# Patient Record
Sex: FEMALE | Race: WHITE | NOT HISPANIC OR LATINO | Employment: OTHER | URBAN - METROPOLITAN AREA
[De-identification: names, ages, dates, MRNs, and addresses within clinical notes are randomized per-mention and may not be internally consistent; named-entity substitution may affect disease eponyms.]

---

## 2017-03-27 ENCOUNTER — HOSPITAL ENCOUNTER (EMERGENCY)
Facility: HOSPITAL | Age: 42
Discharge: HOME | End: 2017-03-27
Attending: EMERGENCY MEDICINE | Admitting: EMERGENCY MEDICINE
Payer: COMMERCIAL

## 2017-03-27 VITALS
DIASTOLIC BLOOD PRESSURE: 73 MMHG | OXYGEN SATURATION: 99 % | RESPIRATION RATE: 18 BRPM | HEIGHT: 64 IN | HEART RATE: 78 BPM | SYSTOLIC BLOOD PRESSURE: 130 MMHG | TEMPERATURE: 98 F | WEIGHT: 140 LBS | BODY MASS INDEX: 23.9 KG/M2

## 2017-03-27 DIAGNOSIS — R20.2 PARESTHESIAS: Primary | ICD-10-CM

## 2017-03-27 LAB
BUN SERPL-MCNC: 11 MG/DL (ref 6–30)
CHLORIDE SERPL-SCNC: 102 MMOL/L (ref 95–110)
CREAT SERPL-MCNC: 0.5 MG/DL (ref 0.5–1.4)
GLUCOSE SERPL-MCNC: 115 MG/DL (ref 70–110)
HCT VFR BLD CALC: 39 %PCV (ref 36–54)
POC IONIZED CALCIUM: 1.07 MMOL/L (ref 1.06–1.42)
POC TCO2 (MEASURED): 25 MMOL/L (ref 23–29)
POTASSIUM BLD-SCNC: 3.1 MMOL/L (ref 3.5–5.1)
SAMPLE: ABNORMAL
SODIUM BLD-SCNC: 141 MMOL/L (ref 136–145)

## 2017-03-27 PROCEDURE — 99283 EMERGENCY DEPT VISIT LOW MDM: CPT

## 2017-03-27 PROCEDURE — 25000003 PHARM REV CODE 250: Performed by: STUDENT IN AN ORGANIZED HEALTH CARE EDUCATION/TRAINING PROGRAM

## 2017-03-27 PROCEDURE — 99284 EMERGENCY DEPT VISIT MOD MDM: CPT | Mod: ,,, | Performed by: EMERGENCY MEDICINE

## 2017-03-27 RX ADMIN — POTASSIUM BICARBONATE 50 MEQ: 25 TABLET, EFFERVESCENT ORAL at 03:03

## 2017-03-27 NOTE — ED PROVIDER NOTES
Encounter Date: 3/27/2017    SCRIBE #1 NOTE: I, Charis Maddox, am scribing for, and in the presence of,  Dr. Chin. I have scribed the following portions of the note - the Resident attestation.       History     Chief Complaint   Patient presents with    Tingling     all over body, foggy, rika on for 1 week     Review of patient's allergies indicates:  No Known Allergies  HPI Comments: 42 year old female with no significant PMHx presents to the ED with tingling sensation in hands and feet. Patient claims she first noted tingling sensation in feet while wearing ski boots a few months ago, since then the tingling has progressed and now involves her hands and has become associated with migratory burning pains in her chest, back, and thighs over the last few days. Patient has also noticed more fatigue over the last few days. She is being followed by a PCP outside the [facility] system and labs were drawn this past Tuesday to work-up her symptoms. She has been prescribed oral B12 and Cymbalta, although she has not been taking the Cymbalta.    The history is provided by the patient.     History reviewed. No pertinent past medical history.  Past Surgical History:   Procedure Laterality Date     SECTION      DILATION AND CURETTAGE OF UTERUS       Family History   Problem Relation Age of Onset    No Known Problems Mother     No Known Problems Father     No Known Problems Sister     No Known Problems Brother     No Known Problems Maternal Aunt     No Known Problems Maternal Uncle     No Known Problems Paternal Aunt     No Known Problems Paternal Uncle     No Known Problems Maternal Grandmother     No Known Problems Maternal Grandfather     No Known Problems Paternal Grandmother     No Known Problems Paternal Grandfather     Amblyopia Neg Hx     Blindness Neg Hx     Cancer Neg Hx     Cataracts Neg Hx     Diabetes Neg Hx     Glaucoma Neg Hx     Hypertension Neg Hx     Macular degeneration Neg Hx      Retinal detachment Neg Hx     Strabismus Neg Hx     Stroke Neg Hx     Thyroid disease Neg Hx      Social History   Substance Use Topics    Smoking status: Never Smoker    Smokeless tobacco: Never Used    Alcohol use Yes      Comment: occasionally     Review of Systems   Constitutional: Positive for fatigue. Negative for chills, diaphoresis and fever.   HENT: Negative for congestion, rhinorrhea, sore throat and trouble swallowing.    Eyes: Negative for visual disturbance.   Respiratory: Negative for cough and shortness of breath.    Cardiovascular: Negative for chest pain and palpitations.   Gastrointestinal: Negative for abdominal distention, abdominal pain, diarrhea and nausea.   Endocrine: Negative for cold intolerance.   Genitourinary: Negative for dysuria.   Musculoskeletal: Positive for myalgias (Migratory burning sensation in chest, back, and thighs).   Skin: Negative for color change.   Neurological: Positive for weakness and numbness.   Psychiatric/Behavioral: Negative for agitation and confusion. The patient is nervous/anxious.        Physical Exam   Initial Vitals   BP Pulse Resp Temp SpO2   03/27/17 1247 03/27/17 1247 03/27/17 1247 03/27/17 1247 03/27/17 1247   133/86 114 18 98.9 °F (37.2 °C) 98 %     Physical Exam    Constitutional: She appears well-developed and well-nourished.   Nervous/Anxious   HENT:   Head: Normocephalic and atraumatic.   Mouth/Throat: Oropharynx is clear and moist.   Eyes: EOM are normal. No scleral icterus.   Neck: Normal range of motion.   Cardiovascular: Regular rhythm, normal heart sounds and intact distal pulses. Exam reveals no friction rub.    No murmur heard.  Tachycardic   Pulmonary/Chest: Breath sounds normal. No respiratory distress. She has no wheezes. She has no rales.   Abdominal: Soft. Bowel sounds are normal. She exhibits no distension. There is no tenderness. There is no rebound.   Neurological: She is alert and oriented to person, place, and time. She  has normal strength. No sensory deficit.   Negative romberg   Skin: Skin is warm and dry.   Psychiatric:   Anxious         ED Course   Procedures  Labs Reviewed   ISTAT PROCEDURE - Abnormal; Notable for the following:        Result Value    POC Glucose 115 (*)     POC Potassium 3.1 (*)     All other components within normal limits             Medical Decision Making:   History:   Old Medical Records: I decided to obtain old medical records.  Clinical Tests:   Lab Tests: Ordered and Reviewed            Scribe Attestation:   Scribe #1: I performed the above scribed service and the documentation accurately describes the services I performed. I attest to the accuracy of the note.    Attending Attestation:   Physician Attestation Statement for Resident:  As the supervising MD   Physician Attestation Statement: I have personally seen and examined this patient.   I agree with the above history. -:   As the supervising MD I agree with the above PE.    As the supervising MD I agree with the above treatment, course, plan, and disposition.   -: Tingling in both arms and both legs off and on for one day and other isolated, multiple vague complaints. She is very anxious and symptoms are likely due to anxiety. Neuro exam is normal. Her potassium is 3.1. Will replace this. She has had extensive outpatient work up with follow up. Suitable for discharge and continued outpatient work up.          Physician Attestation for Scribe:  Physician Attestation Statement for Scribe #1: I, Dr. Chin, reviewed documentation, as scribed by Charis Maddox in my presence, and it is both accurate and complete.                 ED Course     Clinical Impression:   The encounter diagnosis was Paresthesias.          Harley Chin MD  03/29/17 2751

## 2017-03-27 NOTE — ED AVS SNAPSHOT
"          [facility]  [address]  [facility] [address]               Eunice MORENO Isidrobharti   3/27/2017  1:20 PM   ED    Description:  Female : 1975   Department:  [facility]           Your Care was Coordinated By:     Provider Role From To    Harley Chin MD Attending Provider 17 [id number] --    Jakub Kelley MD Resident 17 [phone] --      Reason for Visit     Tingling           ED Disposition     ED Disposition Condition Comment    Discharge             To Do List           [facility] On [facility]     [facility] Nurse Care Line -  Assistance  Registered nurses in the [facility] provide clinical advisement, health education, appointment booking, and other advisory services.  Call for this free service at [phone].             Medications           Message regarding Medications     Verify the changes and/or additions to your medication regime listed below are the same as discussed with your clinician today.  If any of these changes or additions are incorrect, please notify your healthcare provider.        These medications were administered today        Dose Freq    potassium bicarbonate disintegrating tablet 50 mEq 50 mEq Once    Sig: Take 2 tablets (50 mEq total) by mouth once.    Class: Normal    Route: Oral           Verify that the below list of medications is an accurate representation of the medications you are currently taking.  If none reported, the list may be blank. If incorrect, please contact your healthcare provider. Carry this list with you in case of emergency.           Current Medications     DULOXETINE HCL (CYMBALTA ORAL) Take by mouth.    prenatal vit #108-iron-FA 30 mg iron- 800 mcg Tab Take by mouth.           Clinical Reference Information           Your Vitals Were     BP Pulse Temp Resp Height Weight    135/81 119 98.9 °F (37.2 °C) (Oral) 15 5' 4" (1.626 m) 63.5 kg (140 lb)    SpO2 BMI             " 100% 24.03 kg/m2         Allergies as of 3/27/2017     No Known Allergies      Immunizations Administered on Date of Encounter - 3/27/2017     None      ED Micro, Lab, POCT     Start Ordered       Status Ordering Provider    03/27/17 1452 03/27/17 1452  ISTAT PROCEDURE  Once      Final result     03/27/17 1404 03/27/17 1403  ISTAT CHEM8  Once      Acknowledged       ED Imaging Orders     None      [facility] Sign-Up     Activating your [facility] account is as easy as 1-2-3!     1) Visit [email or web address], select Sign Up Now, enter this activation code and your date of birth, then select Next.  [email or web address]  Expires: 5/11/2017  3:16 PM      2) Create a username and password to use when you visit [facility] in the future and select a security question in case you lose your password and select Next.    3) Enter your e-mail address and click Sign Up!    Additional Information  If you have questions, please e-mail [email or web address] or call [phone] to talk to our [facility] staff. Remember, [facility] is NOT to be used for urgent needs. For medical emergencies, dial 911.          [facility] complies with applicable Federal civil rights laws and does not discriminate on the basis of race, color, national origin, age, disability, or sex.        Language Assistance Services     ATTENTION: Language assistance services are available, free of charge. Please call [phone].      ATENCIÓN: Si habla español, tiene a hernandez disposición servicios gratuitos de asistencia lingüística. Llame al [phone].     CHÚ Ý: N?u b?n nói Ti?ng Vi?t, có các d?ch v? h? tr? ngôn ng? mi?n phí dành cho b?n. G?i s? [phone].

## 2017-03-28 ENCOUNTER — TELEPHONE (OUTPATIENT)
Dept: RHEUMATOLOGY | Facility: CLINIC | Age: 42
End: 2017-03-28

## 2017-03-28 NOTE — TELEPHONE ENCOUNTER
----- Message from Quiana Kenny PA-C sent at 3/28/2017  9:30 AM CDT -----  Hi-  I work in Onc with Andie Oconnell who recommended you as a rheumatologist. A friend of mine, above, has had some recent and worsening peripheral neuropathy and joint pain.  She had primary workup at [facility] with PCP and apparently had a positive, but low GRUPO.  She went to ED here at [facility] yesterday for symptoms but no imaging done.  I was wondering what the easiest/quickest way would be to get her in to see you or someone in the department (preferably you as you came highly recommended!) Any info you have is greatly appreciated. Thanks!  Quiana

## 2017-03-28 NOTE — TELEPHONE ENCOUNTER
----- Message from Quiana Kenny PA-C sent at 3/28/2017  9:30 AM CDT -----  Hi-  I work in Onc with Andie Oconnell who recommended you as a rheumatologist. A friend of mine, above, has had some recent and worsening peripheral neuropathy and joint pain.  She had primary workup at [facility] with PCP and apparently had a positive, but low GRUPO.  She went to ED here at [facility] yesterday for symptoms but no imaging done.  I was wondering what the easiest/quickest way would be to get her in to see you or someone in the department (preferably you as you came highly recommended!) Any info you have is greatly appreciated. Thanks!  Quiana

## 2017-03-29 DIAGNOSIS — G62.9 NEUROPATHY: Primary | ICD-10-CM

## 2017-04-04 ENCOUNTER — TELEPHONE (OUTPATIENT)
Dept: NEUROLOGY | Facility: CLINIC | Age: 42
End: 2017-04-04

## 2017-04-04 ENCOUNTER — OFFICE VISIT (OUTPATIENT)
Dept: NEUROLOGY | Facility: CLINIC | Age: 42
End: 2017-04-04
Payer: COMMERCIAL

## 2017-04-04 VITALS
HEART RATE: 101 BPM | HEIGHT: 64 IN | DIASTOLIC BLOOD PRESSURE: 74 MMHG | WEIGHT: 138.44 LBS | SYSTOLIC BLOOD PRESSURE: 111 MMHG | BODY MASS INDEX: 23.64 KG/M2

## 2017-04-04 DIAGNOSIS — G60.8 ACUTE SENSORY NEUROPATHY: Primary | ICD-10-CM

## 2017-04-04 PROCEDURE — 99999 PR PBB SHADOW E&M-EST. PATIENT-LVL III: CPT | Mod: PBBFAC,,, | Performed by: PSYCHIATRY & NEUROLOGY

## 2017-04-04 PROCEDURE — 99204 OFFICE O/P NEW MOD 45 MIN: CPT | Mod: S$GLB,,, | Performed by: PSYCHIATRY & NEUROLOGY

## 2017-04-04 RX ORDER — ZOLPIDEM TARTRATE 5 MG/1
TABLET ORAL
Refills: 0 | COMMUNITY
Start: 2017-03-27 | End: 2023-09-20 | Stop reason: ALTCHOICE

## 2017-04-04 RX ORDER — NAPROXEN SODIUM 550 MG/1
TABLET ORAL
Refills: 0 | COMMUNITY
Start: 2017-03-06 | End: 2023-09-20 | Stop reason: ALTCHOICE

## 2017-04-04 NOTE — PROGRESS NOTES
Name: Eunice Hickman  MRN: [id number]   CSN: [id number]      Date: 04/04/2017    Referring physician:  Aaareferral Self  No address on file    Chief Complaint / Interval History: Consult      History of Present Illness (HPI):  43 yo RH     Went skiing over [place], developed excrutiating toe pain, seemed to be more on top, felt too tight, maybe worse on the L than R.  Tried larger boots, went another day and was just as bad as before.  Better when the boots were removed.      After returning home to [facility], was having more persistent pain in the 2-4 toes, went and got new shoes.  And in retrospect, she had noted some     But, then developed tingling in the hands and fingers, more distally, maybe the ones with 3-4 digits.  Had plain films of the neck and shoulders and R elbow.    Had an experience of burning sensation into her body, seemed to come from the center of her chest.    This is in the realm of additional muscle soreness and some tendon issues - while martir more active with [employer] classes and tennis.    Saw a neurologist near  (Dr. Ines Narvaez) and had EMG/NCV.      Father had hip replaced.  Another spell of dizziness.  Had MRI several years ago, diagnosed with MS with 2 lesions, no LP.      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    Nonmotor/Premotor ROS:  Hyposmia (HENT)?No  RBD/sleep issues (Constitutional)?No  Depression/anxiety (Psychiatric)?No  Fatigue (Constitutional)?No  Constipation (GI)?No  Urinary issues ()?No  Sexual dysfunction ()?No  Orthostasis (Cardiovascular)?No  Leg swelling (Cardiovascular)? No  Falls (Musculoskeletal)?No  Cognitive impairment (Neurologic)?No  Psychoses (Psychiatric)?No  Pain/Paresthesia (Neurologic)?No  Visual changes (Eyes)?No  Moles / skin changes (Skin)?No  Stridor / SOB (Pulm)?No  Bruising (Heme)?No    Past Medical History: The patient  has no past medical history on file.    Social History: The patient  reports  "that she has never smoked. She has never used smokeless tobacco. She reports that she drinks alcohol. She reports that she does not use illicit drugs.    Family History: Their family history includes No Known Problems in her brother, father, maternal aunt, maternal grandfather, maternal grandmother, maternal uncle, mother, paternal aunt, paternal grandfather, paternal grandmother, paternal uncle, and sister. There is no history of Amblyopia, Blindness, Cancer, Cataracts, Diabetes, Glaucoma, Hypertension, Macular degeneration, Retinal detachment, Strabismus, Stroke, or Thyroid disease.    Allergies: Review of patient's allergies indicates no known allergies.     Meds:   Current Outpatient Prescriptions on File Prior to Visit   Medication Sig Dispense Refill    prenatal vit #108-iron-FA 30 mg iron- 800 mcg Tab Take by mouth.      DULOXETINE HCL (CYMBALTA ORAL) Take by mouth.       No current facility-administered medications on file prior to visit.        Exam:  /74 (BP Location: Right arm, Patient Position: Sitting, BP Method: Automatic)  Pulse 101  Ht 5' 4" (1.626 m)  Wt 62.8 kg (138 lb 7.2 oz)  BMI 23.76 kg/m2    Constitutional  Well-developed, well-nourished, appears stated age   Ophthalmoscopic  No papilledema with no hemorrhages or exudates bilaterally   Cardiovascular  Radial pulses 2+ and symmetric, no LE edema bilaterally   Neurological    * Mental status  MOCA deferred     - Orientation  Oriented to person, place, time, and situation     - Memory   Intact recent and remote     - Attention/concentration  Attentive, vigilant during exam     - Language  Naming & repetition intact, +2-step commands     - Fund of knowledge  Aware of current events     - Executive  Well-organized thoughts     - Other     * Cranial nerves       - CN II  PERRL, visual fields full to confrontation     - CN III, IV, VI  Extraocular movements full, normal pursuits and saccades     - CN V  Sensation V1 - V3 intact     - CN " VII  Face strong and symmetric bilaterally     - CN VIII  Hearing intact bilaterally     - CN IX, X  Palate raises midline and symmetric     - CN XI  SCM and trapezius 5/5 bilaterally     - CN XII  Tongue midline   * Motor  Muscle bulk normal, strength 5/5 throughout x weak 4+/5 EHL on L, no atrophy   * Sensory   Dim to temperature and vibration from mid forearm down and knees down B   * Coordination  No dysmetria with finger-to-nose or heel-to-shin   * Gait  Narrow based, stable   * Deep tendon reflexes  2+ and symmetric throughout UE   2+ knees B, 1+ ankles B   Babinski downgoing bilaterally     Laboratory/Radiological:  - Results:  Admission on 03/27/2017, Discharged on 03/27/2017   Component Date Value Ref Range Status    POC Glucose 03/27/2017 115* 70 - 110 mg/dL Final    POC BUN 03/27/2017 11  6 - 30 mg/dL Final    POC Creatinine 03/27/2017 0.5  0.5 - 1.4 mg/dL Final    POC Sodium 03/27/2017 141  136 - 145 mmol/L Final    POC Potassium 03/27/2017 3.1* 3.5 - 5.1 mmol/L Final    POC Chloride 03/27/2017 102  95 - 110 mmol/L Final    POC TCO2 (MEASURED) 03/27/2017 25  23 - 29 mmol/L Final    POC Ionized Calcium 03/27/2017 1.07  1.06 - 1.42 mmol/L Final    POC Hematocrit 03/27/2017 39  36 - 54 %PCV Final    Sample 03/27/2017 MITCH   Final       - Independent review of images:    Diagnoses:          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  Has dim reflefxes and length dependent sensory loss.  Needs differentiation of demyelinating versus axonal.  Await EMG results.    Medical Decision Making:  - labs  - await EMG results, may repeat  - continue B12 for now    Harley Koch MD, MPH  Division of Movement and Memory Disorders  [facility]  [phone]    ADDENDUM:  Results for CASIMIRO ZAMORANO (MRN [id number]) as of 4/11/2017 19:40   Ref. Range 4/4/2017 12:26   Vitamin B-12 Latest Ref Range: 210 - 950 pg/mL 1025 (H)   Methlymalonic Acid Latest Ref Range: <0.40 umol/L <0.10    Protein, Serum Latest Ref Range: 6.0 - 8.4 g/dL 6.8   Angio Convert Enzyme Latest Ref Range: 8 - 53 U/L 16   Homocysteine Latest Ref Range: 4.0 - 15.5 umol/L 4.2   Thiamine Latest Ref Range: 38 - 122 ug/L 41   Vitamin B6 Latest Ref Range: 5 - 50 ug/L 9   Vit D, 25-Hydroxy Latest Ref Range: 30 - 96 ng/mL 35   Anti-SSA Antibody Latest Ref Range: 0.00 - 19.99 EU 1.03   Anti-SSA Interpretation Latest Ref Range: Negative  Negative   Anti-SSB Antibody Latest Ref Range: 0.00 - 19.99 EU 0.10   Anti-SSB Interpretation Latest Ref Range: Negative  Negative   ds DNA Ab Latest Ref Range: Negative 1:10  Negative 1:10   Antigliadin Ab IgG Latest Ref Range: <20 UNITS 3   Antigliadin Abs, IgA Latest Ref Range: <20 UNITS 6   TTG IgA Latest Ref Range: <20 UNITS 5   TTG IgG Latest Ref Range: <20 UNITS 3   Immunoglobulin A (IgA) Latest Ref Range: 70 - 400 mg/dL 205   Cytoplasmic Neutrophilic Ab Latest Ref Range: <1:20 Titer <1:20   Perinuclear (P-ANCA) Latest Ref Range: <1:20 Titer <1:20   Albumin grams/dl Latest Ref Range: 3.35 - 5.55 g/dL 4.16   Alpha-1 grams/dl Latest Ref Range: 0.17 - 0.41 g/dL 0.26   Alpha-2 grams/dl Latest Ref Range: 0.43 - 0.99 g/dL 0.64   Beta grams/dl Latest Ref Range: 0.50 - 1.10 g/dL 0.64   Gamma grams/dl Latest Ref Range: 0.67 - 1.58 g/dL 1.10   RPR Latest Ref Range: Non-reactive  Non-reactive   GRUPO Screen Latest Ref Range: Negative <1:160  Negative <1:160

## 2017-04-04 NOTE — MR AVS SNAPSHOT
[facility]  [address]  [facility] [address]  Phone: [phone]  Fax: [phone]                  Eunice MORENO Isidrobharti   2017 11:00 AM   Office Visit    Description:  Female : 1975   Provider:  Harley Koch MD   Department:  [facility] - Neurology           Reason for Visit     Consult           Diagnoses this Visit        Comments    Acute sensory neuropathy    -  Primary            To Do List           Goals (5 Years of Data)     None      [facility] On Call     [facility] On Call Nurse Care Line -  Assistance  Unless otherwise directed by your provider, please contact [facility], our nurse care line that is available for  assistance.     Registered nurses in the [facility] Call Center provide: appointment scheduling, clinical advisement, health education, and other advisory services.  Call: [phone] (toll free)               Medications           Message regarding Medications     Verify the changes and/or additions to your medication regime listed below are the same as discussed with your clinician today.  If any of these changes or additions are incorrect, please notify your healthcare provider.             Verify that the below list of medications is an accurate representation of the medications you are currently taking.  If none reported, the list may be blank. If incorrect, please contact your healthcare provider. Carry this list with you in case of emergency.           Current Medications     CYANOCOBALAMIN, VITAMIN B-12, (VITAMIN B-12 ORAL) Take by mouth.    DULOXETINE HCL (CYMBALTA ORAL) Take by mouth.    naproxen sodium (ANAPROX) 550 MG tablet TK 1 T PO BID WF    prenatal vit #108-iron-FA 30 mg iron- 800 mcg Tab Take by mouth.    zolpidem (AMBIEN) 5 MG Tab TK 1 T PO QHS PRF INSOMNIA           Clinical Reference Information           Your Vitals Were     BP Pulse Height Weight BMI    111/74 (BP Location: Right arm, Patient Position: Sitting, BP  "Method: Automatic) 101 5' 4" (1.626 m) 62.8 kg (138 lb 7.2 oz) 23.76 kg/m2      Blood Pressure          Most Recent Value    BP  111/74      Allergies as of 4/4/2017     No Known Allergies      Immunizations Administered on Date of Encounter - 4/4/2017     None      Orders Placed During Today's Visit     Future Labs/Procedures Expected by Expires    GRUPO  4/4/2017 6/3/2018    ANGIOTENSIN CONVERTING ENZYME  4/4/2017 6/3/2018    ANTI -SSA ANTIBODY  4/4/2017 6/3/2018    ANTI-DNA ANTIBODY, DOUBLE-STRANDED  4/4/2017 6/3/2018    ANTI-NEUTROPHILIC CYTOPLASMIC ANTIBODY  4/4/2017 6/3/2018    ANTI-SSB ANTIBODY  4/4/2017 6/3/2018    Celiac Disease Panel  4/4/2017 6/3/2018    HOMOCYSTEINE, SERUM  4/4/2017 6/3/2018    METHYLMALONIC ACID, SERUM  4/4/2017 6/3/2018    PROTEIN ELECTROPHORESIS, SERUM  4/4/2017 6/3/2018    RPR  4/4/2017 6/3/2018    VITAMIN B12  4/4/2017 6/3/2018    VITAMIN B1  4/4/2017 6/3/2018    VITAMIN B6  4/4/2017 6/3/2018    VITAMIN D  4/4/2017 6/3/2018      [facility] Sign-Up     Activating your [facility] account is as easy as 1-2-3!     1) Visit [email or web address], select Sign Up Now, enter this activation code and your date of birth, then select Next.  [email or web address]  Expires: 5/11/2017  3:16 PM      2) Create a username and password to use when you visit [facility] in the future and select a security question in case you lose your password and select Next.    3) Enter your e-mail address and click Sign Up!    Additional Information  If you have questions, please e-mail [email or web address] or call [phone] to talk to our [facility] staff. Remember, [facility] is NOT to be used for urgent needs. For medical emergencies, dial 911.         Language Assistance Services     ATTENTION: Language assistance services are available, free of charge. Please call [phone].      ATENCIÓN: Si habla español, tiene a hernandez disposición servicios gratuitos de asistencia lingüística. Llame al [phone].     CHÚ Ý: N?u b?n " nói Ti?ng Vi?t, có các d?ch v? h? tr? ngôn ng? mi?n phí dành cho b?n. G?i s? [phone].         [facility] complies with applicable Federal civil rights laws and does not discriminate on the basis of race, color, national origin, age, disability, or sex.

## 2017-04-07 ENCOUNTER — PATIENT MESSAGE (OUTPATIENT)
Dept: NEUROLOGY | Facility: CLINIC | Age: 42
End: 2017-04-07

## 2017-04-07 ENCOUNTER — TELEPHONE (OUTPATIENT)
Dept: NEUROLOGY | Facility: CLINIC | Age: 42
End: 2017-04-07

## 2017-04-07 NOTE — TELEPHONE ENCOUNTER
Pt anxiety getting worse due to symptoms: numbness in finger tip and calf along with tingling in chin. She stated that her pcp prescribed her xanax to help her. Pt having bad anxiety attacks.

## 2017-04-07 NOTE — TELEPHONE ENCOUNTER
----- Message from Ruth Todd sent at 4/6/2017  9:39 AM CDT -----  Contact: Shanna [phone]  Patient is requesting a call from Dr. Koch in regards to her Neuropathy and Anxiety syptoms.

## 2017-04-12 ENCOUNTER — TELEPHONE (OUTPATIENT)
Dept: NEUROLOGY | Facility: CLINIC | Age: 42
End: 2017-04-12

## 2017-04-12 NOTE — TELEPHONE ENCOUNTER
Returned call to pt and she would like to know the results of he labs. They ones which were pending. Also, would like the results of the EMG. She states her symptoms are worst as well . She would like to know the next steps in treatment.

## 2017-04-12 NOTE — TELEPHONE ENCOUNTER
----- Message from Yolanda Hopper sent at 4/12/2017  8:08 AM CDT -----  Contact: pt [phone]   Pt is calling to speak with  regarding her labs.pls call

## 2017-04-13 ENCOUNTER — PATIENT MESSAGE (OUTPATIENT)
Dept: NEUROLOGY | Facility: CLINIC | Age: 42
End: 2017-04-13

## 2017-04-17 ENCOUNTER — TELEPHONE (OUTPATIENT)
Dept: NEUROLOGY | Facility: CLINIC | Age: 42
End: 2017-04-17

## 2017-04-19 ENCOUNTER — TELEPHONE (OUTPATIENT)
Dept: NEUROLOGY | Facility: CLINIC | Age: 42
End: 2017-04-19

## 2017-04-19 NOTE — TELEPHONE ENCOUNTER
----- Message from Ruth Todd sent at 4/18/2017 10:00 AM CDT -----  Contact: Patient [phone]  Patient is calling in regards to the phone call she was supposed to receive from Dr. Koch, in regards to getting admitted into the hospital for a few days, patient is not doing well. Please call

## 2017-04-20 ENCOUNTER — HOSPITAL ENCOUNTER (OUTPATIENT)
Facility: HOSPITAL | Age: 42
Discharge: HOME | End: 2017-04-22
Attending: EMERGENCY MEDICINE | Admitting: HOSPITALIST
Payer: COMMERCIAL

## 2017-04-20 DIAGNOSIS — O34.219 PREVIOUS CESAREAN SECTION COMPLICATING PREGNANCY, ANTEPARTUM CONDITION OR COMPLICATION: ICD-10-CM

## 2017-04-20 DIAGNOSIS — R20.0 NUMBNESS: Primary | ICD-10-CM

## 2017-04-20 DIAGNOSIS — O35.2XX0: ICD-10-CM

## 2017-04-20 DIAGNOSIS — G35 MULTIPLE SCLEROSIS: ICD-10-CM

## 2017-04-20 LAB
ALBUMIN SERPL BCP-MCNC: 4.1 G/DL
ALP SERPL-CCNC: 48 U/L
ALT SERPL W/O P-5'-P-CCNC: 14 U/L
ANION GAP SERPL CALC-SCNC: 11 MMOL/L
AST SERPL-CCNC: 16 U/L
BASOPHILS # BLD AUTO: 0.03 K/UL
BASOPHILS NFR BLD: 0.5 %
BILIRUB SERPL-MCNC: 0.6 MG/DL
BUN SERPL-MCNC: 16 MG/DL
CALCIUM SERPL-MCNC: 9.2 MG/DL
CHLORIDE SERPL-SCNC: 104 MMOL/L
CO2 SERPL-SCNC: 24 MMOL/L
CREAT SERPL-MCNC: 0.8 MG/DL
DIFFERENTIAL METHOD: ABNORMAL
EOSINOPHIL # BLD AUTO: 0.6 K/UL
EOSINOPHIL NFR BLD: 9 %
ERYTHROCYTE [DISTWIDTH] IN BLOOD BY AUTOMATED COUNT: 12.5 %
EST. GFR  (AFRICAN AMERICAN): >60 ML/MIN/1.73 M^2
EST. GFR  (NON AFRICAN AMERICAN): >60 ML/MIN/1.73 M^2
GLUCOSE SERPL-MCNC: 85 MG/DL
HCT VFR BLD AUTO: 38.2 %
HGB BLD-MCNC: 13.3 G/DL
INR PPP: 1
LYMPHOCYTES # BLD AUTO: 1.7 K/UL
LYMPHOCYTES NFR BLD: 25.8 %
MCH RBC QN AUTO: 30.4 PG
MCHC RBC AUTO-ENTMCNC: 34.8 %
MCV RBC AUTO: 87 FL
MONOCYTES # BLD AUTO: 0.3 K/UL
MONOCYTES NFR BLD: 4.5 %
NEUTROPHILS # BLD AUTO: 4 K/UL
NEUTROPHILS NFR BLD: 60 %
PLATELET # BLD AUTO: 199 K/UL
PMV BLD AUTO: 9.1 FL
POTASSIUM SERPL-SCNC: 4.3 MMOL/L
PROT SERPL-MCNC: 6.9 G/DL
PROTHROMBIN TIME: 10.9 SEC
RBC # BLD AUTO: 4.37 M/UL
SODIUM SERPL-SCNC: 139 MMOL/L
WBC # BLD AUTO: 6.66 K/UL

## 2017-04-20 PROCEDURE — 85610 PROTHROMBIN TIME: CPT

## 2017-04-20 PROCEDURE — 80053 COMPREHEN METABOLIC PANEL: CPT

## 2017-04-20 PROCEDURE — 25500020 PHARM REV CODE 255: Performed by: HOSPITALIST

## 2017-04-20 PROCEDURE — 25000003 PHARM REV CODE 250: Performed by: PHYSICIAN ASSISTANT

## 2017-04-20 PROCEDURE — 99215 OFFICE O/P EST HI 40 MIN: CPT | Mod: ,,, | Performed by: PSYCHIATRY & NEUROLOGY

## 2017-04-20 PROCEDURE — 85025 COMPLETE CBC W/AUTO DIFF WBC: CPT

## 2017-04-20 PROCEDURE — G0378 HOSPITAL OBSERVATION PER HR: HCPCS

## 2017-04-20 PROCEDURE — 99219 PR INITIAL OBSERVATION CARE,LEVL II: CPT | Mod: ,,, | Performed by: PHYSICIAN ASSISTANT

## 2017-04-20 PROCEDURE — 99284 EMERGENCY DEPT VISIT MOD MDM: CPT | Mod: ,,, | Performed by: HOSPITALIST

## 2017-04-20 PROCEDURE — 99284 EMERGENCY DEPT VISIT MOD MDM: CPT

## 2017-04-20 PROCEDURE — A9585 GADOBUTROL INJECTION: HCPCS | Performed by: HOSPITALIST

## 2017-04-20 RX ORDER — GADOBUTROL 604.72 MG/ML
7 INJECTION INTRAVENOUS
Status: COMPLETED | OUTPATIENT
Start: 2017-04-20 | End: 2017-04-20

## 2017-04-20 RX ORDER — NAPROXEN SODIUM 275 MG/1
550 TABLET ORAL 2 TIMES DAILY PRN
Status: DISCONTINUED | OUTPATIENT
Start: 2017-04-20 | End: 2017-04-22 | Stop reason: HOSPADM

## 2017-04-20 RX ORDER — ACETAMINOPHEN 325 MG/1
650 TABLET ORAL EVERY 4 HOURS PRN
Status: DISCONTINUED | OUTPATIENT
Start: 2017-04-20 | End: 2017-04-20

## 2017-04-20 RX ORDER — ALPRAZOLAM 1 MG/1
1 TABLET ORAL ONCE
Status: COMPLETED | OUTPATIENT
Start: 2017-04-20 | End: 2017-04-20

## 2017-04-20 RX ORDER — ALPRAZOLAM 0.25 MG/1
0.25 TABLET ORAL EVERY 6 HOURS PRN
Status: DISCONTINUED | OUTPATIENT
Start: 2017-04-20 | End: 2017-04-22 | Stop reason: HOSPADM

## 2017-04-20 RX ORDER — ONDANSETRON 8 MG/1
8 TABLET, ORALLY DISINTEGRATING ORAL EVERY 8 HOURS PRN
Status: DISCONTINUED | OUTPATIENT
Start: 2017-04-20 | End: 2017-04-22 | Stop reason: HOSPADM

## 2017-04-20 RX ORDER — SODIUM CHLORIDE 0.9 % (FLUSH) 0.9 %
3 SYRINGE (ML) INJECTION EVERY 8 HOURS
Status: DISCONTINUED | OUTPATIENT
Start: 2017-04-20 | End: 2017-04-22 | Stop reason: HOSPADM

## 2017-04-20 RX ORDER — ZOLPIDEM TARTRATE 5 MG/1
5 TABLET ORAL NIGHTLY PRN
Status: DISCONTINUED | OUTPATIENT
Start: 2017-04-20 | End: 2017-04-22 | Stop reason: HOSPADM

## 2017-04-20 RX ORDER — ACETAMINOPHEN 325 MG/1
650 TABLET ORAL EVERY 6 HOURS PRN
Status: DISCONTINUED | OUTPATIENT
Start: 2017-04-20 | End: 2017-04-22 | Stop reason: HOSPADM

## 2017-04-20 RX ORDER — CYANOCOBALAMIN (VITAMIN B-12) 250 MCG
250 TABLET ORAL DAILY
Status: DISCONTINUED | OUTPATIENT
Start: 2017-04-20 | End: 2017-04-22 | Stop reason: HOSPADM

## 2017-04-20 RX ORDER — ALPRAZOLAM 0.5 MG/1
0.5 TABLET ORAL DAILY PRN
Status: DISCONTINUED | OUTPATIENT
Start: 2017-04-20 | End: 2017-04-20

## 2017-04-20 RX ORDER — ALPRAZOLAM 1 MG/1
1 TABLET ORAL DAILY PRN
Status: DISCONTINUED | OUTPATIENT
Start: 2017-04-20 | End: 2017-04-20

## 2017-04-20 RX ORDER — ACETAMINOPHEN 325 MG/1
TABLET ORAL
Status: DISPENSED
Start: 2017-04-20 | End: 2017-04-21

## 2017-04-20 RX ORDER — ONDANSETRON 2 MG/ML
4 INJECTION INTRAMUSCULAR; INTRAVENOUS EVERY 8 HOURS PRN
Status: DISCONTINUED | OUTPATIENT
Start: 2017-04-20 | End: 2017-04-22 | Stop reason: HOSPADM

## 2017-04-20 RX ADMIN — SODIUM CHLORIDE, PRESERVATIVE FREE 3 ML: 5 INJECTION INTRAVENOUS at 03:04

## 2017-04-20 RX ADMIN — ACETAMINOPHEN 650 MG: 325 TABLET ORAL at 02:04

## 2017-04-20 RX ADMIN — ALPRAZOLAM 1 MG: 1 TABLET ORAL at 05:04

## 2017-04-20 RX ADMIN — GADOBUTROL 7 ML: 604.72 INJECTION INTRAVENOUS at 07:04

## 2017-04-20 RX ADMIN — ZOLPIDEM TARTRATE 5 MG: 5 TABLET ORAL at 09:04

## 2017-04-20 NOTE — CONSULTS
[facility]  Neurology  Consult Note    Patient Name: Eunice Hickman  MRN: [id number]  Admission Date: 2017  Hospital Length of Stay: 0 days  Code Status: Full Code   Attending Provider: Harley Koch MD  Consulting Provider: Jakub Kelley MD  Primary Care Physician: Abigail Roberts MD  Principal Problem:Numbness    Consults   Subjective:     Chief Complaint:  Distal parathesia    HPI:   42 year old female with no significant PMHx presents to the ED with worsening paraesthesias in hands and feet. Neurology consulted for management. Patient claims she has had progressive tingling, burning, myalagia, arthralgia, and weakness since [email or web address], developed excrutiating toe pain while wearing ski boots, symptoms continued after returning home, new boots and shoes did not provide any relief. She later developed tingling in the hands and fingers, more distally. Patient seen in recently in clinic by Dr. Koch on 17, began autoimmune/infectious work-up with no pertinent findings so far, also saw a neurologist near  (Dr. Ines Narvaez) and had EMG/NCV, results were unimpressive. Patient now presenting with worsening symptoms as the burning and tingling is now affecting her calf and forearm, although symptoms are intermittent and resolve spontaneously. She also noticed worsening burning pain in her toes after standing up for 20-30 minutes. She denies any bowel/bladder incontinence or saddle paresthesias.        History reviewed. No pertinent past medical history.    Past Surgical History:   Procedure Laterality Date     SECTION      DILATION AND CURETTAGE OF UTERUS      HERNIA REPAIR       Review of patient's allergies indicates:  No Known Allergies    No current facility-administered medications on file prior to encounter.      Current Outpatient Prescriptions on File Prior to Encounter   Medication Sig    CYANOCOBALAMIN, VITAMIN B-12, (VITAMIN B-12 ORAL)  Take by mouth.    naproxen sodium (ANAPROX) 550 MG tablet TK 1 T PO BID WF    zolpidem (AMBIEN) 5 MG Tab TK 1 T PO QHS PRF INSOMNIA    prenatal vit #108-iron-FA 30 mg iron- 800 mcg Tab Take by mouth.    [DISCONTINUED] DULOXETINE HCL (CYMBALTA ORAL) Take by mouth.     Family History     Problem Relation (Age of Onset)    No Known Problems Mother, Father, Sister, Brother, Maternal Aunt, Maternal Uncle, Paternal Aunt, Paternal Uncle, Maternal Grandmother, Maternal Grandfather, Paternal Grandmother, Paternal Grandfather        Social History Main Topics    Smoking status: Never Smoker    Smokeless tobacco: Never Used    Alcohol use Yes      Comment: occasionally    Drug use: No    Sexual activity: Yes     Partners: Male     Review of Systems   Constitutional: Positive for fatigue. Negative for activity change, chills and fever.   HENT: Negative for congestion, rhinorrhea and sore throat.    Eyes: Positive for pain (intermittent eye discomfort). Negative for photophobia.   Respiratory: Negative for cough, choking and chest tightness.    Cardiovascular: Positive for chest pain (intermittent chest burning sensation). Negative for leg swelling.   Gastrointestinal: Negative for abdominal distention, abdominal pain, constipation, diarrhea, nausea and vomiting.   Genitourinary: Negative for dysuria.   Musculoskeletal: Positive for arthralgias and myalgias. Negative for neck pain.   Skin: Negative for color change and rash.   Neurological: Positive for dizziness, weakness, light-headedness and headaches. Negative for numbness.   Psychiatric/Behavioral: Negative for agitation, behavioral problems and confusion.     Objective:     Vital Signs (Most Recent):  Temp: 98.2 °F (36.8 °C) (04/20/17 1422)  Pulse: 100 (04/20/17 1422)  Resp: 16 (04/20/17 1422)  BP: 104/66 (04/20/17 1422)  SpO2: 100 % (04/20/17 1422) Vital Signs (24h Range):  Temp:  [98.1 °F (36.7 °C)-98.2 °F (36.8 °C)] 98.2 °F (36.8 °C)  Pulse:  []  100  Resp:  [16] 16  SpO2:  [100 %] 100 %  BP: (104-157)/(55-76) 104/66     Weight: 61.7 kg (136 lb)  Body mass index is 23.34 kg/(m^2).    Physical Exam   Constitutional: She is oriented to person, place, and time. She appears well-developed and well-nourished.   Anxious   HENT:   Head: Normocephalic and atraumatic.   Mouth/Throat: Oropharyngeal exudate present.   Eyes: EOM are normal.   Cardiovascular: Normal rate, regular rhythm and normal heart sounds.    Pulmonary/Chest: Effort normal and breath sounds normal.   Abdominal: Soft. She exhibits no distension. There is no tenderness.   Neurological: She is alert and oriented to person, place, and time. She has a normal Finger-Nose-Finger Test, a normal Heel to Shin Test, a normal Romberg Test (Mild sway) and a normal Tandem Gait Test. Gait normal.   Reflex Scores:       Bicep reflexes are 2+ on the right side and 2+ on the left side.       Patellar reflexes are 3+ on the right side and 3+ on the left side.  Skin: Skin is warm and dry.   Psychiatric: Her speech is normal.       NEUROLOGICAL EXAMINATION:     MENTAL STATUS   Oriented to person, place, and time.   Attention: normal. Concentration: normal.   Speech: speech is normal     CRANIAL NERVES     CN II   Visual fields full to confrontation.     CN III, IV, VI   Extraocular motions are normal.   Right pupil: Consensual response: APD.   Left pupil: Consensual response: intact.   CN III: no CN III palsy  CN VI: no CN VI palsy  Nystagmus: none   Diplopia: none  Upgaze: normal  Downgaze: normal    CN V   Facial sensation intact.     CN VII   Facial expression full, symmetric.     CN VIII   CN VIII normal.   Hearing: intact    CN IX, X   CN IX normal.   CN X normal.     CN XI   CN XI normal.     CN XII   CN XII normal.     MOTOR EXAM   Muscle bulk: normal  Overall muscle tone: normal    Strength   Right strength: Left HLE mildly weaker then left    REFLEXES     Reflexes   Right biceps: 2+  Left biceps: 2+  Right  patellar: 3+  Left patellar: 3+  Right plantar: normal  Left plantar: normal  Right ankle clonus: absent  Left ankle clonus: absent    SENSORY EXAM   Light touch normal.   Right leg vibration: decreased from toes  Left leg vibration: decreased from toes  Lowest level of vibration sensation on right: Decreased vibration sense in bilateral distal LE.  Lowest level of vibration sensation on left: same as right.  Romberg: negative (Mild sway)    GAIT AND COORDINATION     Gait  Gait: normal     Coordination   Finger to nose coordination: normal  Heel to shin coordination: normal  Tandem walking coordination: normal    Tremor   Action tremor: High frequency low amplitude in right upper extremity.      Significant Labs: All pertinent lab results from the past 24 hours have been reviewed.    Significant Imaging: I have reviewed and interpreted all pertinent imaging results/findings within the past 24 hours.    Assessment and Plan:     * Numbness  42 year old female presents to ED with worsening peripheral paresthesias. Neurology consulted for management.    - intermittent tingling, burning, weakness in distal extremities, also with amaya-oral tingling, symptoms have seemed to progress over last couple months  - seen by Dr. 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 Has brisk reflexes and length dependent sensory loss. Needs differentiation of demyelinating versus axonal.  - had EMG at outside facility, report reviewed and unimpressive  - recommend MRI W WO Brain, C-spine, T-spine  - pending MRI results may need LP/CSF studies and repeat EMG  - will continue to follow      VTE Risk Mitigation         Ordered     Medium Risk of VTE  Once      04/20/17 1331          Thank you for your consult. I will follow-up with patient. Please contact us if you have any additional questions.    Jakub Kelley,  MD  Neurology  [facility]    ==========  Patient seen and examined.  I agree with the history, exam, assessment and plan within the resident's note as stated above.  Note has been edited by me to reflect my work and changes.    Well known to me, still has an exam and story that may best fit for neuropathy, but has intact (even brisk) reflexes and fluctuating patchy complaints that may be consistent with inflammatory/demyelinating events as well.  Workup as above.    Harley Koch MD, MPH  Division of Movement and Memory Disorders  [facility]

## 2017-04-20 NOTE — ED NOTES
APPEARANCE: awake and alert in NAD.  SKIN: warm, dry and intact. No breakdown or bruising.  MUSCULOSKELETAL: Patient moving all extremities spontaneously, no obvious swelling or deformities noted. Ambulates independently.  RESPIRATORY: no shortness of breath. All breath sounds CTA bilaterally.  CARDIAC: heart tones normal. Regular rate and rhythm; 2+ distal pulses; no peripheral edema  ABDOMEN: S/ND/NT, normoactive bowel sounds present in all four quadrants. Normal stool pattern.  : voids spontaneously without difficulty.  Neurologic: AAO x 4; follows commands equal strength in all extremities; Pt reports numbness and tingling bilateral upper and lower extremeties.

## 2017-04-20 NOTE — ED TRIAGE NOTES
PT reports Dr. Bah is expecting her in the ER. PT reports in both upper and lower extremities she has been having neuropathy, muscle jerking x 1 month. Pt reports episodes of extremity weakness.

## 2017-04-20 NOTE — H&P
[facility] Hospital Medicine  History & Physical    Patient Name: Eunice Hickman  MRN: [id number]  Admission Date: 2017  Attending Physician: Nenita Calhoun MD   Primary Care Provider: Abigail Roberts MD    [facility] Medicine Team: [facility] Neetu Mcgovern PA-C     Patient information was obtained from patient and ER records.     Subjective:     Principal Problem:Numbness    Chief Complaint:   Chief Complaint   Patient presents with    Numbness     Pt reports bilateral facial, hands, and feet numbness x 1 month Pt of Dr. Koch.         HPI: 42 year old female with no significant PMHx being admitted to observation for further evaluation of worsening numbness. Patient says the numbness began in her toes bilaterally during a ski trip at the end of February. Patient states since then, the numbness has spread through her feet and presents in her hands. Patient denies alleviating factors. She states that being on her feet for 30 minutes or more aggravates the numbness. Patient endorses associated anxiety, SOB, and weakness with movement. Patient's father had an incidental finding of 2 lesions on MRI that was concerning for MS, but he never had symptoms.    Patient was referred to the ED by neurology for further workup. In the ED, CBC, CMP were unremarkable; Alk Phos 48. Neurology was consulted, ordered and MRI of brain, c-spine, and t-spine; appreciate further recommendations.     Patient denies CP, N/V, paralysis, slurred speech, trouble breathing/swallowing.        History reviewed. No pertinent past medical history.    Past Surgical History:   Procedure Laterality Date     SECTION      DILATION AND CURETTAGE OF UTERUS      HERNIA REPAIR         Review of patient's allergies indicates:  No Known Allergies    No current facility-administered medications on file prior to encounter.      Current Outpatient Prescriptions on File Prior to Encounter   Medication Sig     CYANOCOBALAMIN, VITAMIN B-12, (VITAMIN B-12 ORAL) Take by mouth.    naproxen sodium (ANAPROX) 550 MG tablet TK 1 T PO BID WF    zolpidem (AMBIEN) 5 MG Tab TK 1 T PO QHS PRF INSOMNIA    prenatal vit #108-iron-FA 30 mg iron- 800 mcg Tab Take by mouth.    [DISCONTINUED] DULOXETINE HCL (CYMBALTA ORAL) Take by mouth.     Family History     Problem Relation (Age of Onset)    No Known Problems Mother, Father, Sister, Brother, Maternal Aunt, Maternal Uncle, Paternal Aunt, Paternal Uncle, Maternal Grandmother, Maternal Grandfather, Paternal Grandmother, Paternal Grandfather        Social History Main Topics    Smoking status: Never Smoker    Smokeless tobacco: Never Used    Alcohol use Yes      Comment: occasionally    Drug use: No    Sexual activity: Yes     Partners: Male     Review of Systems   Constitutional: Positive for fatigue. Negative for chills, fever and unexpected weight change.   HENT: Negative for drooling, ear pain, hearing loss, sinus pressure, tinnitus and trouble swallowing.    Eyes: Negative for photophobia, pain and visual disturbance.   Respiratory: Positive for shortness of breath. Negative for cough, choking, chest tightness and wheezing.    Cardiovascular: Negative for chest pain, palpitations and leg swelling.   Gastrointestinal: Negative for abdominal distention, abdominal pain, diarrhea, nausea and vomiting.   Endocrine: Negative for heat intolerance.   Genitourinary: Negative for dysuria, flank pain, hematuria and urgency.   Musculoskeletal: Negative for gait problem, myalgias, neck pain and neck stiffness.   Neurological: Positive for weakness and numbness. Negative for dizziness, tremors, seizures, syncope, facial asymmetry, speech difficulty and light-headedness.   Psychiatric/Behavioral: Negative for agitation, confusion and hallucinations. The patient is nervous/anxious.      Objective:     Vital Signs (Most Recent):  Temp: 98.1 °F (36.7 °C) (04/20/17 0847)  Pulse: 89 (04/20/17  1130)  Resp: 16 (04/20/17 0847)  BP: (!) 157/74 (04/20/17 1130)  SpO2: 100 % (04/20/17 1130) Vital Signs (24h Range):  Temp:  [98.1 °F (36.7 °C)] 98.1 °F (36.7 °C)  Pulse:  [] 89  Resp:  [16] 16  SpO2:  [100 %] 100 %  BP: (107-157)/(55-76) 157/74     Weight: 61.7 kg (136 lb)  Body mass index is 23.34 kg/(m^2).    Physical Exam   Constitutional: She is oriented to person, place, and time. She appears well-developed and well-nourished. No distress (very anxious).   HENT:   Head: Normocephalic and atraumatic.   Right Ear: External ear normal.   Left Ear: External ear normal.   Nose: Nose normal.   Mouth/Throat: Oropharynx is clear and moist.   Eyes: Conjunctivae and EOM are normal. Pupils are equal, round, and reactive to light. No scleral icterus.   Neck: Normal range of motion. Neck supple. No thyromegaly present.   Cardiovascular: Regular rhythm, normal heart sounds and intact distal pulses.  Tachycardia present.    No murmur heard.  Pulmonary/Chest: Effort normal and breath sounds normal. No respiratory distress. She has no wheezes.   Abdominal: Soft. Bowel sounds are normal. She exhibits no distension. There is no tenderness.   Musculoskeletal: Normal range of motion. She exhibits no edema or tenderness.   Neurological: She is alert and oriented to person, place, and time. She has normal strength and normal reflexes. No cranial nerve deficit. Coordination normal.   Skin: Skin is warm and dry. No rash noted.   Psychiatric: She has a normal mood and affect. Her behavior is normal.   Vitals reviewed.       Significant Labs:   CBC:   Recent Labs  Lab 04/20/17  1002   WBC 6.66   HGB 13.3   HCT 38.2        CMP:   Recent Labs  Lab 04/20/17  1002      K 4.3      CO2 24   GLU 85   BUN 16   CREATININE 0.8   CALCIUM 9.2   PROT 6.9   ALBUMIN 4.1   BILITOT 0.6   ALKPHOS 48*   AST 16   ALT 14   ANIONGAP 11   EGFRNONAA >60.0       Significant Imaging: I have reviewed all pertinent imaging  results/findings within the past 24 hours.    Assessment/Plan:     * Numbness  - Patient referred to the ED by Neurology for workup of numbness. Patient's previous workup outpatient included extensive rheumatologic, vitamin studies, and EMG from OSH, awaiting results of those studies. Per patient, EMG studies were non contributory.  - Neurology consulted and appreciate recs  - MRI brain, c-spine, t-spine ordered  - Continue home Xanax PRN for anxiety and one time dose prior to MRI    VTE Risk Mitigation         Ordered     Medium Risk of VTE  Once      04/20/17 1331        Neetu Mcgovern PA-C  Department of Hospital Medicine   [facility]

## 2017-04-20 NOTE — ED PROVIDER NOTES
Encounter Date: 2017    SCRIBE #1 NOTE: I, Sj Forrest, am scribing for, and in the presence of,  Dr. Garces . I have scribed the following portions of the note - the APC attestation.       History     Chief Complaint   Patient presents with    Numbness     Pt reports bilateral facial, hands, and feet numbness x 1 month Pt of Dr. Koch.      Review of patient's allergies indicates:  No Known Allergies  HPI Comments: Patient is a 42 year old female with no significant PMHx presents to the ED with tingling sensation in hands and feet that has been ongoing for 1 month and worsening over the past week.  Patient is being followed by neurology and was instructed to report to the emergency department.  Patient claims she first noted tingling sensation in feet while wearing ski boots a few weeks ago and since then the tingling has progressed and now involves her hands and has become associated with migratory burning pains in her chest, back, and thighs over the last few days. Patient has also noticed more fatigue over the last few days.  Due to above symptoms patient did present to the emergency department.    The history is provided by the patient.     History reviewed. No pertinent past medical history.  Past Surgical History:   Procedure Laterality Date     SECTION      DILATION AND CURETTAGE OF UTERUS      HERNIA REPAIR       Family History   Problem Relation Age of Onset    No Known Problems Mother     No Known Problems Father     No Known Problems Sister     No Known Problems Brother     No Known Problems Maternal Aunt     No Known Problems Maternal Uncle     No Known Problems Paternal Aunt     No Known Problems Paternal Uncle     No Known Problems Maternal Grandmother     No Known Problems Maternal Grandfather     No Known Problems Paternal Grandmother     No Known Problems Paternal Grandfather     Amblyopia Neg Hx     Blindness Neg Hx     Cancer Neg Hx     Cataracts Neg Hx      Diabetes Neg Hx     Glaucoma Neg Hx     Hypertension Neg Hx     Macular degeneration Neg Hx     Retinal detachment Neg Hx     Strabismus Neg Hx     Stroke Neg Hx     Thyroid disease Neg Hx      Social History   Substance Use Topics    Smoking status: Never Smoker    Smokeless tobacco: Never Used    Alcohol use Yes      Comment: occasionally     Review of Systems   Constitutional: Positive for fatigue. Negative for activity change, appetite change, chills and fever.   HENT: Negative for congestion, drooling, ear pain, hearing loss, sinus pressure, sore throat and trouble swallowing.    Eyes: Negative for photophobia, pain and discharge.   Respiratory: Negative for apnea, cough, choking and shortness of breath.    Cardiovascular: Negative for chest pain and leg swelling.   Gastrointestinal: Negative for abdominal distention, abdominal pain, blood in stool, diarrhea and nausea.   Endocrine: Negative for cold intolerance, heat intolerance and polyphagia.   Genitourinary: Negative for difficulty urinating, dysuria, flank pain, frequency and hematuria.   Musculoskeletal: Negative for arthralgias, back pain, gait problem and myalgias.   Skin: Negative for color change, rash and wound.   Allergic/Immunologic: Negative for environmental allergies.   Neurological: Positive for numbness. Negative for dizziness, tremors, speech difficulty, weakness and headaches.   Hematological: Negative for adenopathy.   Psychiatric/Behavioral: Negative for agitation, confusion and dysphoric mood.       Physical Exam   Initial Vitals   BP Pulse Resp Temp SpO2   04/20/17 0847 04/20/17 0847 04/20/17 0847 04/20/17 0847 04/20/17 0847   130/55 98 16 98.1 °F (36.7 °C) 100 %     Physical Exam    Nursing note and vitals reviewed.  Constitutional: She appears well-developed and well-nourished.   HENT:   Head: Normocephalic and atraumatic.   Eyes: EOM are normal. Pupils are equal, round, and reactive to light.   Neck: Normal range of  motion. Neck supple. No thyromegaly present. No tracheal deviation present. No JVD present.   Cardiovascular: Normal rate and regular rhythm. Exam reveals no gallop and no friction rub.    No murmur heard.  Pulmonary/Chest: Breath sounds normal. No stridor. No respiratory distress. She has no wheezes. She has no rhonchi. She has no rales. She exhibits no tenderness.   Abdominal: Soft. Bowel sounds are normal. She exhibits no distension and no mass. There is no tenderness. There is no rebound and no guarding.   Musculoskeletal: Normal range of motion. She exhibits no edema or tenderness.   Lymphadenopathy:     She has no cervical adenopathy.   Neurological: She is alert and oriented to person, place, and time. She has normal strength.   Skin: Skin is warm and dry.   Psychiatric: She has a normal mood and affect.         ED Course   Procedures  Labs Reviewed   CBC W/ AUTO DIFFERENTIAL - Abnormal; Notable for the following:        Result Value    MPV 9.1 (*)     Eos # 0.6 (*)     Eosinophil% 9.0 (*)     All other components within normal limits   COMPREHENSIVE METABOLIC PANEL - Abnormal; Notable for the following:     Alkaline Phosphatase 48 (*)     All other components within normal limits   PROTIME-INR             Medical Decision Making:   History:   Old Medical Records: I decided to obtain old medical records.       APC / Resident Notes:   Patient is a 42 year old female with no significant PMHx presents to the ED with tingling sensation in hands and feet that has been ongoing for 1 month and worsening over the past week.  Physical exam reveals female in no acute distress.  Heart regular rate and rhythm.  Lungs clear to auscultation bilaterally.  Hyperreflexia and clonus noted.  Neurology will be consulted.    Neurology did see and examine the patient and like the patient to be admitted to hospital medicine for further workup.  Will obtain a MRI brain, cervical, and thoracic per neurology request.  Further  treatment pending clinical course.        Scribe Attestation:   Scribe #1: I performed the above scribed service and the documentation accurately describes the services I performed. I attest to the accuracy of the note.    Attending Attestation:     Physician Attestation Statement for NP/PA:   I have conducted a face to face encounter with this patient in addition to the NP/PA, due to Medical Complexity    Other NP/PA Attestation Additions:      Medical Decision Making: Emergent evaluation of a 42 y.o. Female who presents with bilateral upper and lower extremity numbness.  Exam remarkable for hyperreflexia and clonus, which is concern for possible MS or other autoimmune neurological syndrome. Plan per APC, likely admit for workup including MRI.       Physician Attestation for Scribe:  Physician Attestation Statement for Scribe #1: I, Dr. Garces, reviewed documentation, as scribed by Sj Forrest  in my presence, and it is both accurate and complete.                 ED Course     Clinical Impression:   The primary encounter diagnosis was Numbness. A diagnosis of Multiple sclerosis was also pertinent to this visit.    Disposition:   Disposition: Admitted  Condition: Stable       Krys Pollard PA-C  04/20/17 1435       Krys Pollard PA-C  04/20/17 1435       Teodoro Garces MD  04/22/17 1343

## 2017-04-20 NOTE — ASSESSMENT & PLAN NOTE
42 year old female presents to ED with worsening peripheral paresthesias. Neurology consulted for management.    - intermittent tingling, burning, weakness in distal extremities, also with amaya-oral tingling, symptoms have seemed to progress over last couple months  - seen by Dr. 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 Has brisk reflexes and length dependent sensory loss. Needs differentiation of demyelinating versus axonal.  - had EMG at outside facility, report reviewed and unimpressive  - recommend MRI W WO Brain, C-spine, T-spine  - pending MRI results may need LP/CSF studies and repeat EMG  - will continue to follow

## 2017-04-20 NOTE — SUBJECTIVE & OBJECTIVE
History reviewed. No pertinent past medical history.    Past Surgical History:   Procedure Laterality Date     SECTION      DILATION AND CURETTAGE OF UTERUS      HERNIA REPAIR       Review of patient's allergies indicates:  No Known Allergies    No current facility-administered medications on file prior to encounter.      Current Outpatient Prescriptions on File Prior to Encounter   Medication Sig    CYANOCOBALAMIN, VITAMIN B-12, (VITAMIN B-12 ORAL) Take by mouth.    naproxen sodium (ANAPROX) 550 MG tablet TK 1 T PO BID WF    zolpidem (AMBIEN) 5 MG Tab TK 1 T PO QHS PRF INSOMNIA    prenatal vit #108-iron-FA 30 mg iron- 800 mcg Tab Take by mouth.    [DISCONTINUED] DULOXETINE HCL (CYMBALTA ORAL) Take by mouth.     Family History     Problem Relation (Age of Onset)    No Known Problems Mother, Father, Sister, Brother, Maternal Aunt, Maternal Uncle, Paternal Aunt, Paternal Uncle, Maternal Grandmother, Maternal Grandfather, Paternal Grandmother, Paternal Grandfather        Social History Main Topics    Smoking status: Never Smoker    Smokeless tobacco: Never Used    Alcohol use Yes      Comment: occasionally    Drug use: No    Sexual activity: Yes     Partners: Male     Review of Systems   Constitutional: Positive for fatigue. Negative for activity change, chills and fever.   HENT: Negative for congestion, rhinorrhea and sore throat.    Eyes: Positive for pain (intermittent eye discomfort). Negative for photophobia.   Respiratory: Negative for cough, choking and chest tightness.    Cardiovascular: Positive for chest pain (intermittent chest burning sensation). Negative for leg swelling.   Gastrointestinal: Negative for abdominal distention, abdominal pain, constipation, diarrhea, nausea and vomiting.   Genitourinary: Negative for dysuria.   Musculoskeletal: Positive for arthralgias and myalgias. Negative for neck pain.   Skin: Negative for color change and rash.   Neurological: Positive for  dizziness, weakness, light-headedness and headaches. Negative for numbness.   Psychiatric/Behavioral: Negative for agitation, behavioral problems and confusion.     Objective:     Vital Signs (Most Recent):  Temp: 98.2 °F (36.8 °C) (04/20/17 1422)  Pulse: 100 (04/20/17 1422)  Resp: 16 (04/20/17 1422)  BP: 104/66 (04/20/17 1422)  SpO2: 100 % (04/20/17 1422) Vital Signs (24h Range):  Temp:  [98.1 °F (36.7 °C)-98.2 °F (36.8 °C)] 98.2 °F (36.8 °C)  Pulse:  [] 100  Resp:  [16] 16  SpO2:  [100 %] 100 %  BP: (104-157)/(55-76) 104/66     Weight: 61.7 kg (136 lb)  Body mass index is 23.34 kg/(m^2).    Physical Exam   Constitutional: She is oriented to person, place, and time. She appears well-developed and well-nourished.   Anxious   HENT:   Head: Normocephalic and atraumatic.   Mouth/Throat: Oropharyngeal exudate present.   Eyes: EOM are normal.   Cardiovascular: Normal rate, regular rhythm and normal heart sounds.    Pulmonary/Chest: Effort normal and breath sounds normal.   Abdominal: Soft. She exhibits no distension. There is no tenderness.   Neurological: She is alert and oriented to person, place, and time. She has a normal Finger-Nose-Finger Test, a normal Heel to Shin Test, a normal Romberg Test (Mild sway) and a normal Tandem Gait Test. Gait normal.   Reflex Scores:       Bicep reflexes are 2+ on the right side and 2+ on the left side.       Patellar reflexes are 3+ on the right side and 3+ on the left side.  Skin: Skin is warm and dry.   Psychiatric: Her speech is normal.       NEUROLOGICAL EXAMINATION:     MENTAL STATUS   Oriented to person, place, and time.   Attention: normal. Concentration: normal.   Speech: speech is normal     CRANIAL NERVES     CN II   Visual fields full to confrontation.     CN III, IV, VI   Extraocular motions are normal.   Right pupil: Consensual response: APD.   Left pupil: Consensual response: intact.   CN III: no CN III palsy  CN VI: no CN VI palsy  Nystagmus: none   Diplopia:  none  Upgaze: normal  Downgaze: normal    CN V   Facial sensation intact.     CN VII   Facial expression full, symmetric.     CN VIII   CN VIII normal.   Hearing: intact    CN IX, X   CN IX normal.   CN X normal.     CN XI   CN XI normal.     CN XII   CN XII normal.     MOTOR EXAM   Muscle bulk: normal  Overall muscle tone: normal    Strength   Right strength: Left HLE mildly weaker then left    REFLEXES     Reflexes   Right biceps: 2+  Left biceps: 2+  Right patellar: 3+  Left patellar: 3+  Right plantar: normal  Left plantar: normal  Right ankle clonus: absent  Left ankle clonus: absent    SENSORY EXAM   Light touch normal.   Right leg vibration: decreased from toes  Left leg vibration: decreased from toes  Lowest level of vibration sensation on right: Decreased vibration sense in bilateral distal LE.  Lowest level of vibration sensation on left: same as right.  Romberg: negative (Mild sway)    GAIT AND COORDINATION     Gait  Gait: normal     Coordination   Finger to nose coordination: normal  Heel to shin coordination: normal  Tandem walking coordination: normal    Tremor   Action tremor: High frequency low amplitude in right upper extremity.      Significant Labs: All pertinent lab results from the past 24 hours have been reviewed.    Significant Imaging: I have reviewed and interpreted all pertinent imaging results/findings within the past 24 hours.

## 2017-04-20 NOTE — ED NOTES
Rounding on the patient has been done. The patient has been updated on the plan of care and current status. Pain was assessed and is currently a 0/10. Comfort positioning and restroom needs were addressed. Necessary items were placed with in reach and was advised when a reassessment would take place. The call bell remains at the bedside for any additional patient needs. The patient is resting comfortably on the stretcher, respirations are even and unlabored, skin warm and dry. Will continue to monitor.Mother at bedside

## 2017-04-20 NOTE — ED NOTES
Pt resting in bed. No distress noted. RR even and unlabored. Bed in low locked position. Side rails up x2. Call bell within reach.

## 2017-04-20 NOTE — ASSESSMENT & PLAN NOTE
- Patient referred to the ED by Neurology for workup of numbness. Patient's previous workup outpatient included extensive rheumatologic, vitamin studies, and EMG from OSH, awaiting results of those studies. Per patient, EMG studies were non contributory.  - Neurology consulted and appreciate recs  - MRI brain, c-spine, t-spine ordered  - Continue home Xanax PRN for anxiety and one time dose prior to MRI

## 2017-04-20 NOTE — SUBJECTIVE & OBJECTIVE
History reviewed. No pertinent past medical history.    Past Surgical History:   Procedure Laterality Date     SECTION      DILATION AND CURETTAGE OF UTERUS      HERNIA REPAIR         Review of patient's allergies indicates:  No Known Allergies    No current facility-administered medications on file prior to encounter.      Current Outpatient Prescriptions on File Prior to Encounter   Medication Sig    CYANOCOBALAMIN, VITAMIN B-12, (VITAMIN B-12 ORAL) Take by mouth.    naproxen sodium (ANAPROX) 550 MG tablet TK 1 T PO BID WF    zolpidem (AMBIEN) 5 MG Tab TK 1 T PO QHS PRF INSOMNIA    prenatal vit #108-iron-FA 30 mg iron- 800 mcg Tab Take by mouth.    [DISCONTINUED] DULOXETINE HCL (CYMBALTA ORAL) Take by mouth.     Family History     Problem Relation (Age of Onset)    No Known Problems Mother, Father, Sister, Brother, Maternal Aunt, Maternal Uncle, Paternal Aunt, Paternal Uncle, Maternal Grandmother, Maternal Grandfather, Paternal Grandmother, Paternal Grandfather        Social History Main Topics    Smoking status: Never Smoker    Smokeless tobacco: Never Used    Alcohol use Yes      Comment: occasionally    Drug use: No    Sexual activity: Yes     Partners: Male     Review of Systems   Constitutional: Positive for fatigue. Negative for chills, fever and unexpected weight change.   HENT: Negative for drooling, ear pain, hearing loss, sinus pressure, tinnitus and trouble swallowing.    Eyes: Negative for photophobia, pain and visual disturbance.   Respiratory: Positive for shortness of breath. Negative for cough, choking, chest tightness and wheezing.    Cardiovascular: Negative for chest pain, palpitations and leg swelling.   Gastrointestinal: Negative for abdominal distention, abdominal pain, diarrhea, nausea and vomiting.   Endocrine: Negative for heat intolerance.   Genitourinary: Negative for dysuria, flank pain, hematuria and urgency.   Musculoskeletal: Negative for gait problem, myalgias,  neck pain and neck stiffness.   Neurological: Positive for weakness and numbness. Negative for dizziness, tremors, seizures, syncope, facial asymmetry, speech difficulty and light-headedness.   Psychiatric/Behavioral: Negative for agitation, confusion and hallucinations. The patient is nervous/anxious.      Objective:     Vital Signs (Most Recent):  Temp: 98.1 °F (36.7 °C) (04/20/17 0847)  Pulse: 89 (04/20/17 1130)  Resp: 16 (04/20/17 0847)  BP: (!) 157/74 (04/20/17 1130)  SpO2: 100 % (04/20/17 1130) Vital Signs (24h Range):  Temp:  [98.1 °F (36.7 °C)] 98.1 °F (36.7 °C)  Pulse:  [] 89  Resp:  [16] 16  SpO2:  [100 %] 100 %  BP: (107-157)/(55-76) 157/74     Weight: 61.7 kg (136 lb)  Body mass index is 23.34 kg/(m^2).    Physical Exam   Constitutional: She is oriented to person, place, and time. She appears well-developed and well-nourished. No distress (very anxious).   HENT:   Head: Normocephalic and atraumatic.   Right Ear: External ear normal.   Left Ear: External ear normal.   Nose: Nose normal.   Mouth/Throat: Oropharynx is clear and moist.   Eyes: Conjunctivae and EOM are normal. Pupils are equal, round, and reactive to light. No scleral icterus.   Neck: Normal range of motion. Neck supple. No thyromegaly present.   Cardiovascular: Regular rhythm, normal heart sounds and intact distal pulses.  Tachycardia present.    No murmur heard.  Pulmonary/Chest: Effort normal and breath sounds normal. No respiratory distress. She has no wheezes.   Abdominal: Soft. Bowel sounds are normal. She exhibits no distension. There is no tenderness.   Musculoskeletal: Normal range of motion. She exhibits no edema or tenderness.   Neurological: She is alert and oriented to person, place, and time. She has normal strength and normal reflexes. No cranial nerve deficit. Coordination normal.   Skin: Skin is warm and dry. No rash noted.   Psychiatric: She has a normal mood and affect. Her behavior is normal.   Vitals reviewed.        Significant Labs:   CBC:   Recent Labs  Lab 04/20/17  1002   WBC 6.66   HGB 13.3   HCT 38.2        CMP:   Recent Labs  Lab 04/20/17  1002      K 4.3      CO2 24   GLU 85   BUN 16   CREATININE 0.8   CALCIUM 9.2   PROT 6.9   ALBUMIN 4.1   BILITOT 0.6   ALKPHOS 48*   AST 16   ALT 14   ANIONGAP 11   EGFRNONAA >60.0       Significant Imaging: I have reviewed all pertinent imaging results/findings within the past 24 hours.

## 2017-04-20 NOTE — IP AVS SNAPSHOT
[facility]  [address]  [facility] [address]  Phone: [phone]           Patient Discharge Instructions   Our goal is to set you up for success. This packet includes information on your condition, medications, and your home care.  It will help you care for yourself to prevent having to return to the hospital.     Please ask your nurse if you have any questions.      There are many details to remember when preparing to leave the hospital. Here is what you will need to do:    1. Take your medicine. If you are prescribed medications, review your Medication List on the following pages. You may have new medications to  at the pharmacy and others that you'll need to stop taking. Review the instructions for how and when to take your medications. Talk with your doctor or nurses if you are unsure of what to do.     2. Go to your follow-up appointments. Specific follow-up information is listed in the following pages. Your may be contacted by a nurse or clinical provider about future appointments. Be sure we have all of the phone numbers to reach you. Please contact your provider's office if you are unable to make an appointment.     3. Watch for warning signs. Your doctor or nurse will give you detailed warning signs to watch for and when to call for assistance. These instructions may also include educational information about your condition. If you experience any of warning signs to your health, call your doctor.           [facility]  Unless otherwise directed by your provider, please   contact [facility], our nurse care line   that is available for 24/7 assistance.     [phone] (toll-free)     Registered nurses in the [facility]   provide: appointment scheduling, clinical advisement, health education, and other advisory services.                  ** Verify the list of medication(s) below is accurate and up to date. Carry this with you in case of  emergency. If your medications have changed, please notify your healthcare provider.             Medication List      START taking these medications        Additional Info                      alpha lipoic acid 600 mg Cap   Quantity:  30 each   Refills:  5   Dose:  1 tablet    Instructions:  Take 1 tablet by mouth once daily.     Begin Date    AM    Noon    PM    Bedtime       gabapentin 100 MG capsule   Commonly known as:  NEURONTIN   Quantity:  90 capsule   Refills:  5   Dose:  100 mg    Instructions:  Take 1 capsule (100 mg total) by mouth 3 (three) times daily as needed (pain).     Begin Date    AM    Noon    PM    Bedtime         CONTINUE taking these medications        Additional Info                      naproxen sodium 550 MG tablet   Commonly known as:  ANAPROX   Refills:  0    Last time this was given:  550 mg on 4/21/2017  4:33 PM   Instructions:  TK 1 T PO BID WF     Begin Date    AM    Noon    PM    Bedtime       prenatal vit #108-iron-FA 30 mg iron- 800 mcg Tab   Refills:  0    Instructions:  Take by mouth.     Begin Date    AM    Noon    PM    Bedtime       VITAMIN B-12 ORAL   Refills:  0    Last time this was given:  250 mcg on 4/22/2017  8:12 AM   Instructions:  Take by mouth.     Begin Date    AM    Noon    PM    Bedtime       zolpidem 5 MG Tab   Commonly known as:  AMBIEN   Refills:  0    Last time this was given:  5 mg on 4/21/2017  8:58 PM   Instructions:  TK 1 T PO QHS PRF INSOMNIA     Begin Date    AM    Noon    PM    Bedtime            Where to Get Your Medications      These medications were sent to [facility] [address] [place] [address] & [address] [address]     Phone:  [phone]     gabapentin 100 MG capsule         You can get these medications from any pharmacy     You don't need a prescription for these medications     alpha lipoic acid 600 mg Cap                  Please bring to all follow up  appointments:    1. A copy of your discharge instructions.  2. All medicines you are currently taking in their original bottles.  3. Identification and insurance card.    Please arrive 15 minutes ahead of scheduled appointment time.    Please call 24 hours in advance if you must reschedule your appointment and/or time.        Your Scheduled Appointments     May 01, 2017  1:00 PM CDT   Consult with Maria De Jesus Ames MD   [facility] - Rheumatology ([facility] )    [address]  [place] [address] [phone]              Follow-up Information     Follow up with Jameson Alvarez MD In 2 weeks.    Specialty:  Neurology    Why:  repeat EMG    Contact information:    [address] [facility] [id number] [phone]          Discharge Instructions     Future Orders    Activity as tolerated     Call MD for:  increased confusion or weakness     Call MD for:  persistent dizziness, light-headedness, or visual disturbances     Call MD for:  redness, tenderness, or signs of infection (pain, swelling, redness, odor or green/yellow discharge around incision site)     Call MD for:  severe uncontrolled pain     Call MD for:  temperature >100.4     Diet general     Questions:    Total calories:      Fat restriction, if any:      Protein restriction, if any:      Na restriction, if any:      Fluid restriction:      Additional restrictions:          Primary Diagnosis     Your primary diagnosis was:  Numbness      Admission Information     Date & Time Provider Department CSN    4/20/2017  8:55 AM Nenita Calhoun MD [facility] [id number]      Care Providers     Provider Role Specialty Primary office phone    Nenita Calhoun MD Attending Provider Hospitalist [phone]    Marcy Billy MD Consulting Physician  Neurology [phone]    Nenita Calhoun MD Team Attending  Hospitalist [phone]      Your Vitals Were     BP Pulse Temp Resp Height Weight    100/57 56 96.5 °F  "(35.8 °C) 16 5' 4" (1.626 m) 61.7 kg (136 lb)    Last Period SpO2 BMI          04/06/2017 (Approximate) 100% 23.34 kg/m2        Recent Lab Values        4/21/2017                          10:42 AM           A1C 5.3           Comment for A1C at 10:42 AM on 4/21/2017:  According to ADA guidelines, hemoglobin A1C <7.0% represents  optimal control in non-pregnant diabetic patients.  Different  metrics may apply to specific populations.   Standards of Medical Care in Diabetes - 2016.  For the purpose of screening for the presence of diabetes:  <5.7%     Consistent with the absence of diabetes  5.7-6.4%  Consistent with increasing risk for diabetes   (prediabetes)  >or=6.5%  Consistent with diabetes  Currently no consensus exists for use of hemoglobin A1C  for diagnosis of diabetes for children.        Pending Labs     Order Current Status    Freeze and Hold,  Collected (04/21/17 8605)    ACE, CSF In process    B. burgdorferi Abs (Lyme Disease) In process    Cryoglobulin In process    Heavy Metals Screen, Blood (Quantitative) In process    Lyme Disease Serology, CSF In process    Lyme Disease Western Blot In process    MS Profile In process    CSF culture Preliminary result      Allergies as of 4/22/2017     No Known Allergies      Advance Directives     An advance directive is a document which, in the event you are no longer able to make decisions for yourself, tells your healthcare team what kind of treatment you do or do not want to receive, or who you would like to make those decisions for you.  If you do not currently have an advance directive, [facility] encourages you to create one.  For more information call:  [phone] ([phone]),  or log on to [email or web address].        Language Assistance Services     ATTENTION: Language assistance services are available, free of charge. Please call [phone].      ATENCIÓN: Si nellala español, tiene a hernandez disposición servicios " aleenaos de asistencia lingüística. Maisha nails [phone].     MANUEL Ý: N?u b?n nói Ti?ng Vi?t, có các d?ch v? h? tr? ngôn ng? mi?n phí dành cho b?n. G?i s? [phone].         [facility] complies with applicable Federal civil rights laws and does not discriminate on the basis of race, color, national origin, age, disability, or sex.

## 2017-04-21 LAB
ALBUMIN SERPL BCP-MCNC: 3.5 G/DL
ALP SERPL-CCNC: 44 U/L
ALT SERPL W/O P-5'-P-CCNC: 11 U/L
ANION GAP SERPL CALC-SCNC: 6 MMOL/L
AST SERPL-CCNC: 13 U/L
BASOPHILS # BLD AUTO: 0.03 K/UL
BASOPHILS NFR BLD: 0.5 %
BILIRUB SERPL-MCNC: 0.5 MG/DL
BUN SERPL-MCNC: 17 MG/DL
CALCIUM SERPL-MCNC: 8.6 MG/DL
CHLORIDE SERPL-SCNC: 106 MMOL/L
CLARITY CSF: CLEAR
CLARITY CSF: CLEAR
CO2 SERPL-SCNC: 26 MMOL/L
COLOR CSF: COLORLESS
COLOR CSF: COLORLESS
CREAT SERPL-MCNC: 0.7 MG/DL
CRP SERPL-MCNC: 0.35 MG/L
DIFFERENTIAL METHOD: ABNORMAL
EOSINOPHIL # BLD AUTO: 0.8 K/UL
EOSINOPHIL NFR BLD: 13 %
ERYTHROCYTE [DISTWIDTH] IN BLOOD BY AUTOMATED COUNT: 12.5 %
ERYTHROCYTE [SEDIMENTATION RATE] IN BLOOD BY WESTERGREN METHOD: 5 MM/HR
EST. GFR  (AFRICAN AMERICAN): >60 ML/MIN/1.73 M^2
EST. GFR  (NON AFRICAN AMERICAN): >60 ML/MIN/1.73 M^2
ESTIMATED AVG GLUCOSE: 105 MG/DL
GLUCOSE CSF-MCNC: 60 MG/DL
GLUCOSE SERPL-MCNC: 91 MG/DL
HBA1C MFR BLD HPLC: 5.3 %
HCT VFR BLD AUTO: 35.2 %
HGB BLD-MCNC: 12.2 G/DL
LYMPHOCYTES # BLD AUTO: 1.8 K/UL
LYMPHOCYTES NFR BLD: 29.2 %
MAGNESIUM SERPL-MCNC: 1.7 MG/DL
MCH RBC QN AUTO: 30.5 PG
MCHC RBC AUTO-ENTMCNC: 34.7 %
MCV RBC AUTO: 88 FL
MONOCYTES # BLD AUTO: 0.5 K/UL
MONOCYTES NFR BLD: 8.2 %
NEUTROPHILS # BLD AUTO: 3 K/UL
NEUTROPHILS NFR BLD: 48.9 %
PHOSPHATE SERPL-MCNC: 4 MG/DL
PLATELET # BLD AUTO: 162 K/UL
PMV BLD AUTO: 9.2 FL
POTASSIUM SERPL-SCNC: 3.7 MMOL/L
PROT CSF-MCNC: 20 MG/DL
PROT SERPL-MCNC: 5.9 G/DL
RBC # BLD AUTO: 4 M/UL
RBC # CSF: 1 /CU MM
RBC # CSF: 5 /CU MM
SODIUM SERPL-SCNC: 138 MMOL/L
SPECIMEN VOL CSF: 4 ML
SPECIMEN VOL CSF: 5 ML
WBC # BLD AUTO: 6.07 K/UL
WBC # CSF: 0 /CU MM
WBC # CSF: 0 /CU MM

## 2017-04-21 PROCEDURE — 85025 COMPLETE CBC W/AUTO DIFF WBC: CPT

## 2017-04-21 PROCEDURE — 82164 ANGIOTENSIN I ENZYME TEST: CPT

## 2017-04-21 PROCEDURE — 99214 OFFICE O/P EST MOD 30 MIN: CPT | Mod: 25,,, | Performed by: PSYCHIATRY & NEUROLOGY

## 2017-04-21 PROCEDURE — 87205 SMEAR GRAM STAIN: CPT

## 2017-04-21 PROCEDURE — 82945 GLUCOSE OTHER FLUID: CPT

## 2017-04-21 PROCEDURE — 84157 ASSAY OF PROTEIN OTHER: CPT

## 2017-04-21 PROCEDURE — 82595 ASSAY OF CRYOGLOBULIN: CPT

## 2017-04-21 PROCEDURE — 80053 COMPREHEN METABOLIC PANEL: CPT

## 2017-04-21 PROCEDURE — 86617 LYME DISEASE ANTIBODY: CPT

## 2017-04-21 PROCEDURE — 62270 DX LMBR SPI PNXR: CPT

## 2017-04-21 PROCEDURE — 83036 HEMOGLOBIN GLYCOSYLATED A1C: CPT

## 2017-04-21 PROCEDURE — 87070 CULTURE OTHR SPECIMN AEROBIC: CPT

## 2017-04-21 PROCEDURE — 84100 ASSAY OF PHOSPHORUS: CPT

## 2017-04-21 PROCEDURE — 85651 RBC SED RATE NONAUTOMATED: CPT

## 2017-04-21 PROCEDURE — 83916 OLIGOCLONAL BANDS: CPT | Mod: 91

## 2017-04-21 PROCEDURE — 62270 DX LMBR SPI PNXR: CPT | Mod: ,,, | Performed by: PSYCHIATRY & NEUROLOGY

## 2017-04-21 PROCEDURE — 86618 LYME DISEASE ANTIBODY: CPT | Mod: 91

## 2017-04-21 PROCEDURE — 36415 COLL VENOUS BLD VENIPUNCTURE: CPT

## 2017-04-21 PROCEDURE — 89051 BODY FLUID CELL COUNT: CPT

## 2017-04-21 PROCEDURE — 83735 ASSAY OF MAGNESIUM: CPT

## 2017-04-21 PROCEDURE — 86141 C-REACTIVE PROTEIN HS: CPT

## 2017-04-21 PROCEDURE — 25000003 PHARM REV CODE 250: Performed by: PHYSICIAN ASSISTANT

## 2017-04-21 PROCEDURE — 99225 PR SUBSEQUENT OBSERVATION CARE,LEVEL II: CPT | Mod: ,,, | Performed by: PHYSICIAN ASSISTANT

## 2017-04-21 PROCEDURE — 99000 SPECIMEN HANDLING OFFICE-LAB: CPT

## 2017-04-21 PROCEDURE — G0378 HOSPITAL OBSERVATION PER HR: HCPCS

## 2017-04-21 PROCEDURE — 86618 LYME DISEASE ANTIBODY: CPT

## 2017-04-21 PROCEDURE — 82300 ASSAY OF CADMIUM: CPT

## 2017-04-21 RX ORDER — ALPRAZOLAM 1 MG/1
1 TABLET ORAL ONCE
Status: COMPLETED | OUTPATIENT
Start: 2017-04-21 | End: 2017-04-21

## 2017-04-21 RX ADMIN — ZOLPIDEM TARTRATE 5 MG: 5 TABLET ORAL at 08:04

## 2017-04-21 RX ADMIN — ALPRAZOLAM 1 MG: 1 TABLET ORAL at 01:04

## 2017-04-21 RX ADMIN — CYANOCOBALAMIN TAB 250 MCG 250 MCG: 250 TAB at 09:04

## 2017-04-21 RX ADMIN — NAPROXEN SODIUM 550 MG: 275 TABLET ORAL at 04:04

## 2017-04-21 RX ADMIN — SODIUM CHLORIDE, PRESERVATIVE FREE 3 ML: 5 INJECTION INTRAVENOUS at 09:04

## 2017-04-21 RX ADMIN — ACETAMINOPHEN 650 MG: 325 TABLET ORAL at 08:04

## 2017-04-21 RX ADMIN — SODIUM CHLORIDE, PRESERVATIVE FREE 3 ML: 5 INJECTION INTRAVENOUS at 10:04

## 2017-04-21 RX ADMIN — ACETAMINOPHEN 650 MG: 325 TABLET ORAL at 11:04

## 2017-04-21 NOTE — PLAN OF CARE
Problem: Patient Care Overview  Goal: Plan of Care Review  Outcome: Ongoing (interventions implemented as appropriate)  Patient AAO,VSS. Patient complains of mild numbness to tips of toes. POC reviewed with patient. Bed in lowest position with wheels locked. Call bell and side table in reach of patient. Will continue to monitor patient.

## 2017-04-21 NOTE — PROGRESS NOTES
[facility]  Neurology  Progress Note    Patient Name: Eunice Hickman  MRN: [id number]  Admission Date: 4/20/2017  Hospital Length of Stay: 0 days  Code Status: Full Code   Attending Provider: Nenita Calhoun MD  Primary Care Physician: Abigail Roberts MD   Principal Problem:Numbness      Subjective:     Interval History:     Sitting up in bed this AM in no acute distress. No new complaints. Still with crampy myalgias and arthralgias, mild dizziness, and pains in her feet with standing/walking.    Current Facility-Administered Medications   Medication Dose Route Frequency Provider Last Rate Last Dose    acetaminophen tablet 650 mg  650 mg Oral Q6H PRN Neetu Areaux, PA-C   650 mg at 04/20/17 1448    alprazolam tablet 0.25 mg  0.25 mg Oral Q6H PRN Neetu Areaux, PA-C        cyanocobalamin tablet 250 mcg  250 mcg Oral Daily Neetu Areaux, PA-C   250 mcg at 04/21/17 0938    naproxen sodium tablet 550 mg  550 mg Oral BID PRN Neetu Areaux, PA-C        ondansetron disintegrating tablet 8 mg  8 mg Oral Q8H PRN Neetu Areaux, PA-C        ondansetron injection 4 mg  4 mg Intravenous Q8H PRN Neetu Areaux, PA-C        sodium chloride 0.9% flush 3 mL  3 mL Intravenous Q8H Neetu Areaux, PA-C   3 mL at 04/21/17 0938    zolpidem tablet 5 mg  5 mg Oral Nightly PRN Neetu Areaux, PA-C   5 mg at 04/20/17 2147       Review of Systems   Constitutional: Positive for fatigue. Negative for activity change, chills and fever.   HENT: Negative for congestion, rhinorrhea and sore throat.    Eyes: Positive for pain (intermittent eye discomfort). Negative for photophobia.   Respiratory: Negative for cough, choking and chest tightness.    Cardiovascular: Positive for chest pain (intermittent chest burning sensation). Negative for leg swelling.   Gastrointestinal: Negative for abdominal distention, abdominal pain, constipation, diarrhea, nausea and vomiting.   Genitourinary: Negative for dysuria.    Musculoskeletal: Positive for arthralgias and myalgias. Negative for neck pain.   Skin: Negative for color change and rash.   Neurological: Positive for dizziness, weakness, light-headedness and headaches. Negative for numbness.   Psychiatric/Behavioral: Negative for agitation, behavioral problems and confusion.     Objective:     Vital Signs (Most Recent):  Temp: 97.7 °F (36.5 °C) (04/21/17 0400)  Pulse: 76 (04/21/17 0400)  Resp: 18 (04/21/17 0400)  BP: (!) 96/53 (04/21/17 0400)  SpO2: 99 % (04/21/17 0400) Vital Signs (24h Range):  Temp:  [97.5 °F (36.4 °C)-98.2 °F (36.8 °C)] 97.7 °F (36.5 °C)  Pulse:  [] 76  Resp:  [16-18] 18  SpO2:  [98 %-100 %] 99 %  BP: ()/(53-74) 96/53     Weight: 61.7 kg (136 lb)  Body mass index is 23.34 kg/(m^2).    Physical Exam   Constitutional: She is oriented to person, place, and time. She appears well-developed and well-nourished.   Anxious   HENT:   Head: Normocephalic and atraumatic.   Mouth/Throat: Oropharyngeal exudate present.   Eyes: EOM are normal.   Cardiovascular: Normal rate, regular rhythm and normal heart sounds.    Pulmonary/Chest: Effort normal and breath sounds normal.   Abdominal: Soft. She exhibits no distension. There is no tenderness.   Neurological: She is alert and oriented to person, place, and time. She has a normal Finger-Nose-Finger Test, a normal Heel to Shin Test, a normal Romberg Test (Mild sway) and a normal Tandem Gait Test. Gait normal.   Reflex Scores:       Bicep reflexes are 2+ on the right side and 2+ on the left side.       Patellar reflexes are 3+ on the right side and 3+ on the left side.  Skin: Skin is warm and dry.   Psychiatric: Her speech is normal.       NEUROLOGICAL EXAMINATION:     MENTAL STATUS   Oriented to person, place, and time.   Attention: normal. Concentration: normal.   Speech: speech is normal     CRANIAL NERVES     CN II   Visual fields full to confrontation.     CN III, IV, VI   Extraocular motions are normal.    Right pupil: Consensual response: APD.   Left pupil: Consensual response: intact.   CN III: no CN III palsy  CN VI: no CN VI palsy  Nystagmus: none   Diplopia: none  Upgaze: normal  Downgaze: normal    CN V   Facial sensation intact.     CN VII   Facial expression full, symmetric.     CN VIII   CN VIII normal.   Hearing: intact    CN IX, X   CN IX normal.   CN X normal.     CN XI   CN XI normal.     CN XII   CN XII normal.     MOTOR EXAM   Muscle bulk: normal  Overall muscle tone: normal    Strength   Right strength: Left HLE mildly weaker then left    REFLEXES     Reflexes   Right biceps: 2+  Left biceps: 2+  Right patellar: 3+  Left patellar: 3+  Right plantar: normal  Left plantar: normal  Right ankle clonus: absent  Left ankle clonus: absent    SENSORY EXAM   Light touch normal.   Right leg vibration: decreased from toes  Left leg vibration: decreased from toes  Lowest level of vibration sensation on right: Decreased vibration sense in bilateral distal LE.  Lowest level of vibration sensation on left: same as right.  Romberg: negative (Mild sway)    GAIT AND COORDINATION     Gait  Gait: normal     Coordination   Finger to nose coordination: normal  Heel to shin coordination: normal  Tandem walking coordination: normal    Tremor   Action tremor: High frequency low amplitude in right upper extremity.      Significant Labs: All pertinent lab results from the past 24 hours have been reviewed.    Significant Imaging: I have reviewed all pertinent imaging results/findings within the past 24 hours.    Assessment and Plan:     * Numbness  42 year old female presents to ED with worsening peripheral paresthesias. Neurology consulted for management.    - intermittent tingling, burning, weakness in distal extremities, also with amaya-oral tingling, symptoms have seemed to progress over last couple months  - seen by Dr. 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 Has brisk reflexes and length dependent sensory loss. Needs differentiation of demyelinating versus axonal.  - had EMG at outside facility, report reviewed and unimpressive  - MRI W WO Brain, C-spine, T-spine preformed on 4/20/17 showed small central disc protrusion at the level of C5-C6 with mild effacement of the anterior thecal sac and minimal mass effect upon the anterior cervical cord, otherwise no evidence of enhancing lesions or demyelinating disease  - will perform LP/CSF studies today, will need repeat EMG as outpatient  - will also check Lyme studies, ESR, CRP, Heavy metals screen, Cryoglobulins  - will continue to follow      VTE Risk Mitigation         Ordered     Medium Risk of VTE  Once      04/20/17 1331          Jakub Kelley MD  Neurology  [facility]    ===========  Patient seen and examined.  I agree with the history, exam, assessment and plan within the resident's note as stated above.  Note has been edited by me to reflect my work and changes.    Central demyelination and inflammation less likely given her MRI brain/C/T spine.  Workup persisting for other axonal or demyelinating polyradiculopathies, including CIDP, mononeuritis multiplex, vasculitic neuropathy or possibly HNPP.    Harley Koch MD, MPH  Division of Movement and Memory Disorders  [facility] [place]

## 2017-04-21 NOTE — SUBJECTIVE & OBJECTIVE
Interval History: Pt had no acute events overnight. This AM, pt resting comfortably with friend at bedside. Pt has no complaints at this time. Pt continues to endorse numbness to bilateral LE digits.     Review of Systems   Constitutional: Positive for fatigue. Negative for activity change, chills, fever and unexpected weight change.   Eyes: Positive for pain (intermittent eye discomfort). Negative for photophobia and visual disturbance.   Respiratory: Negative for cough, shortness of breath and wheezing.    Cardiovascular: Negative for chest pain, palpitations and leg swelling.   Gastrointestinal: Negative for abdominal distention, abdominal pain, constipation, diarrhea, nausea and vomiting.   Musculoskeletal: Positive for arthralgias and myalgias. Negative for gait problem, neck pain and neck stiffness.   Neurological: Positive for dizziness, weakness, light-headedness and headaches. Negative for tremors, seizures, syncope, facial asymmetry, speech difficulty and numbness.   Psychiatric/Behavioral: Negative for agitation, behavioral problems, confusion and hallucinations. The patient is nervous/anxious.      Objective:     Vital Signs (Most Recent):  Temp: 97.8 °F (36.6 °C) (04/21/17 1200)  Pulse: 69 (04/21/17 1200)  Resp: 19 (04/21/17 1200)  BP: (!) 108/57 (04/21/17 1200)  SpO2: 100 % (04/21/17 1200) Vital Signs (24h Range):  Temp:  [97.5 °F (36.4 °C)-98.2 °F (36.8 °C)] 97.8 °F (36.6 °C)  Pulse:  [69-81] 69  Resp:  [16-19] 19  SpO2:  [98 %-100 %] 100 %  BP: ()/(51-61) 108/57     Weight: 61.7 kg (136 lb)  Body mass index is 23.34 kg/(m^2).    Intake/Output Summary (Last 24 hours) at 04/21/17 1514  Last data filed at 04/20/17 1700   Gross per 24 hour   Intake              240 ml   Output                0 ml   Net              240 ml      Physical Exam   Constitutional: She is oriented to person, place, and time. She appears well-developed and well-nourished. No distress (anxious).   Cardiovascular: Normal  rate, regular rhythm, normal heart sounds and intact distal pulses.    No murmur heard.  Pulmonary/Chest: Effort normal and breath sounds normal. No respiratory distress. She has no wheezes.   Abdominal: Soft. Bowel sounds are normal. She exhibits no distension. There is no tenderness.   Musculoskeletal: Normal range of motion. She exhibits no edema or tenderness.   Neurological: She is alert and oriented to person, place, and time. She has normal strength and normal reflexes. No cranial nerve deficit. Coordination normal.   Skin: Skin is warm and dry. No rash noted.   Psychiatric: She has a normal mood and affect. Her behavior is normal.   Vitals reviewed.      Significant Labs: All pertinent labs within the past 24 hours have been reviewed.    Significant Imaging: I have reviewed all pertinent imaging results/findings within the past 24 hours.

## 2017-04-21 NOTE — ASSESSMENT & PLAN NOTE
- Patient referred to the ED by Neurology for workup of numbness. Patient's previous workup outpatient included extensive rheumatologic, vitamin studies, and EMG from OSH, awaiting results of those studies. Per patient, EMG studies were non contributory.  - Neurology consulted and appreciate recs  - MRI brain, c-spine, t-spine ordered  - Continue home Xanax PRN for anxiety and one time dose prior to MRI  4/21: MRI unremarkable. Per neuro, LP today. ESR/CRP WNL. Lyme studies, cryoglobulins, and heavy metals pending.

## 2017-04-21 NOTE — SUBJECTIVE & OBJECTIVE
Subjective:     Interval History:     Sitting up in bed this AM in no acute distress. No new complaints. Still with crampy myalgias and arthralgias, mild dizziness, and pains in her feet with standing/walking.    Current Facility-Administered Medications   Medication Dose Route Frequency Provider Last Rate Last Dose    acetaminophen tablet 650 mg  650 mg Oral Q6H PRN Neetu Mcgovern, PA-C   650 mg at 04/20/17 1448    alprazolam tablet 0.25 mg  0.25 mg Oral Q6H PRN Neetu Hooverux, PA-C        cyanocobalamin tablet 250 mcg  250 mcg Oral Daily Neetu Areaux, PA-C   250 mcg at 04/21/17 0938    naproxen sodium tablet 550 mg  550 mg Oral BID PRN Neetu Mcgovern, PA-C        ondansetron disintegrating tablet 8 mg  8 mg Oral Q8H PRN Neetu Hooverux, PA-C        ondansetron injection 4 mg  4 mg Intravenous Q8H PRN Neetu Hooverux, PA-C        sodium chloride 0.9% flush 3 mL  3 mL Intravenous Q8H Neetu Areaux, PA-C   3 mL at 04/21/17 0938    zolpidem tablet 5 mg  5 mg Oral Nightly PRN Neetu Areaux, PA-C   5 mg at 04/20/17 2145       Review of Systems   Constitutional: Positive for fatigue. Negative for activity change, chills and fever.   HENT: Negative for congestion, rhinorrhea and sore throat.    Eyes: Positive for pain (intermittent eye discomfort). Negative for photophobia.   Respiratory: Negative for cough, choking and chest tightness.    Cardiovascular: Positive for chest pain (intermittent chest burning sensation). Negative for leg swelling.   Gastrointestinal: Negative for abdominal distention, abdominal pain, constipation, diarrhea, nausea and vomiting.   Genitourinary: Negative for dysuria.   Musculoskeletal: Positive for arthralgias and myalgias. Negative for neck pain.   Skin: Negative for color change and rash.   Neurological: Positive for dizziness, weakness, light-headedness and headaches. Negative for numbness.   Psychiatric/Behavioral: Negative for agitation, behavioral problems and confusion.      Objective:     Vital Signs (Most Recent):  Temp: 97.7 °F (36.5 °C) (04/21/17 0400)  Pulse: 76 (04/21/17 0400)  Resp: 18 (04/21/17 0400)  BP: (!) 96/53 (04/21/17 0400)  SpO2: 99 % (04/21/17 0400) Vital Signs (24h Range):  Temp:  [97.5 °F (36.4 °C)-98.2 °F (36.8 °C)] 97.7 °F (36.5 °C)  Pulse:  [] 76  Resp:  [16-18] 18  SpO2:  [98 %-100 %] 99 %  BP: ()/(53-74) 96/53     Weight: 61.7 kg (136 lb)  Body mass index is 23.34 kg/(m^2).    Physical Exam   Constitutional: She is oriented to person, place, and time. She appears well-developed and well-nourished.   Anxious   HENT:   Head: Normocephalic and atraumatic.   Mouth/Throat: Oropharyngeal exudate present.   Eyes: EOM are normal.   Cardiovascular: Normal rate, regular rhythm and normal heart sounds.    Pulmonary/Chest: Effort normal and breath sounds normal.   Abdominal: Soft. She exhibits no distension. There is no tenderness.   Neurological: She is alert and oriented to person, place, and time. She has a normal Finger-Nose-Finger Test, a normal Heel to Shin Test, a normal Romberg Test (Mild sway) and a normal Tandem Gait Test. Gait normal.   Reflex Scores:       Bicep reflexes are 2+ on the right side and 2+ on the left side.       Patellar reflexes are 3+ on the right side and 3+ on the left side.  Skin: Skin is warm and dry.   Psychiatric: Her speech is normal.       NEUROLOGICAL EXAMINATION:     MENTAL STATUS   Oriented to person, place, and time.   Attention: normal. Concentration: normal.   Speech: speech is normal     CRANIAL NERVES     CN II   Visual fields full to confrontation.     CN III, IV, VI   Extraocular motions are normal.   Right pupil: Consensual response: APD.   Left pupil: Consensual response: intact.   CN III: no CN III palsy  CN VI: no CN VI palsy  Nystagmus: none   Diplopia: none  Upgaze: normal  Downgaze: normal    CN V   Facial sensation intact.     CN VII   Facial expression full, symmetric.     CN VIII   CN VIII normal.    Hearing: intact    CN IX, X   CN IX normal.   CN X normal.     CN XI   CN XI normal.     CN XII   CN XII normal.     MOTOR EXAM   Muscle bulk: normal  Overall muscle tone: normal    Strength   Right strength: Left HLE mildly weaker then left    REFLEXES     Reflexes   Right biceps: 2+  Left biceps: 2+  Right patellar: 3+  Left patellar: 3+  Right plantar: normal  Left plantar: normal  Right ankle clonus: absent  Left ankle clonus: absent    SENSORY EXAM   Light touch normal.   Right leg vibration: decreased from toes  Left leg vibration: decreased from toes  Lowest level of vibration sensation on right: Decreased vibration sense in bilateral distal LE.  Lowest level of vibration sensation on left: same as right.  Romberg: negative (Mild sway)    GAIT AND COORDINATION     Gait  Gait: normal     Coordination   Finger to nose coordination: normal  Heel to shin coordination: normal  Tandem walking coordination: normal    Tremor   Action tremor: High frequency low amplitude in right upper extremity.      Significant Labs: All pertinent lab results from the past 24 hours have been reviewed.    Significant Imaging: I have reviewed all pertinent imaging results/findings within the past 24 hours.

## 2017-04-21 NOTE — PROGRESS NOTES
[facility] Hospital Medicine  Progress Note    Patient Name: Eunice Hickman  MRN: [id number]  Patient Class: OP- Observation   Admission Date: 4/20/2017  Length of Stay: 0 days  Attending Physician: Nenita Calhoun MD  Primary Care Provider: Abigail Roberts MD    [facility] Medicine Team: [facility] F Neetu Mcgovern PA-C    Subjective:     Principal Problem:Numbness    HPI:  42 year old female with no significant PMHx being admitted to observation for further evaluation of worsening numbness. Patient says the numbness began in her toes bilaterally during a ski trip at the end of February. Patient states since then, the numbness has spread through her feet and presents in her hands. Patient denies alleviating factors. She states that being on her feet for 30 minutes or more aggravates the numbness. Patient endorses associated anxiety, SOB, and weakness with movement. Patient's father had an incidental finding of 2 lesions on MRI that was concerning for MS, but he never had symptoms.    Patient was referred to the ED by neurology for further workup. In the ED, CBC, CMP were unremarkable; Alk Phos 48. Neurology was consulted, ordered and MRI of brain, c-spine, and t-spine; appreciate further recommendations.     Patient denies CP, N/V, paralysis, slurred speech, trouble breathing/swallowing.        Hospital Course:  Patient admitted to observation for further evaluation of progressive numbness. Neurology was consulted, MRI of brain, c-spine, t-spine ordered.   4/21: MRI unremarkable. Per neuro, LP today. ESR/CRP WNL. Lyme studies, cryoglobulins, and heavy metals pending.       Interval History: Pt had no acute events overnight. This AM, pt resting comfortably with friend at bedside. Pt has no complaints at this time. Pt continues to endorse numbness to bilateral LE digits.     Review of Systems   Constitutional: Positive for fatigue. Negative for activity change, chills, fever and  unexpected weight change.   Eyes: Positive for pain (intermittent eye discomfort). Negative for photophobia and visual disturbance.   Respiratory: Negative for cough, shortness of breath and wheezing.    Cardiovascular: Negative for chest pain, palpitations and leg swelling.   Gastrointestinal: Negative for abdominal distention, abdominal pain, constipation, diarrhea, nausea and vomiting.   Musculoskeletal: Positive for arthralgias and myalgias. Negative for gait problem, neck pain and neck stiffness.   Neurological: Positive for dizziness, weakness, light-headedness and headaches. Negative for tremors, seizures, syncope, facial asymmetry, speech difficulty and numbness.   Psychiatric/Behavioral: Negative for agitation, behavioral problems, confusion and hallucinations. The patient is nervous/anxious.      Objective:     Vital Signs (Most Recent):  Temp: 97.8 °F (36.6 °C) (04/21/17 1200)  Pulse: 69 (04/21/17 1200)  Resp: 19 (04/21/17 1200)  BP: (!) 108/57 (04/21/17 1200)  SpO2: 100 % (04/21/17 1200) Vital Signs (24h Range):  Temp:  [97.5 °F (36.4 °C)-98.2 °F (36.8 °C)] 97.8 °F (36.6 °C)  Pulse:  [69-81] 69  Resp:  [16-19] 19  SpO2:  [98 %-100 %] 100 %  BP: ()/(51-61) 108/57     Weight: 61.7 kg (136 lb)  Body mass index is 23.34 kg/(m^2).    Intake/Output Summary (Last 24 hours) at 04/21/17 1514  Last data filed at 04/20/17 1700   Gross per 24 hour   Intake              240 ml   Output                0 ml   Net              240 ml      Physical Exam   Constitutional: She is oriented to person, place, and time. She appears well-developed and well-nourished. No distress (anxious).   Cardiovascular: Normal rate, regular rhythm, normal heart sounds and intact distal pulses.    No murmur heard.  Pulmonary/Chest: Effort normal and breath sounds normal. No respiratory distress. She has no wheezes.   Abdominal: Soft. Bowel sounds are normal. She exhibits no distension. There is no tenderness.   Musculoskeletal: Normal  range of motion. She exhibits no edema or tenderness.   Neurological: She is alert and oriented to person, place, and time. She has normal strength and normal reflexes. No cranial nerve deficit. Coordination normal.   Skin: Skin is warm and dry. No rash noted.   Psychiatric: She has a normal mood and affect. Her behavior is normal.   Vitals reviewed.      Significant Labs: All pertinent labs within the past 24 hours have been reviewed.    Significant Imaging: I have reviewed all pertinent imaging results/findings within the past 24 hours.    Assessment/Plan:      * Numbness  - Patient referred to the ED by Neurology for workup of numbness. Patient's previous workup outpatient included extensive rheumatologic, vitamin studies, and EMG from OSH, awaiting results of those studies. Per patient, EMG studies were non contributory.  - Neurology consulted and appreciate recs  - MRI brain, c-spine, t-spine ordered  - Continue home Xanax PRN for anxiety and one time dose prior to MRI  4/21: MRI unremarkable. Per neuro, LP today. ESR/CRP WNL. Lyme studies, cryoglobulins, and heavy metals pending.     VTE Risk Mitigation         Ordered     Medium Risk of VTE  Once      04/20/17 1331          Neetu Mcgovern PA-C  Department of Hospital Medicine   [facility]

## 2017-04-21 NOTE — PROCEDURES
"Eunice Hickman is a 42 y.o. female patient.    Temp: 97.8 °F (36.6 °C) (04/21/17 0800)  Pulse: 81 (04/21/17 0800)  Resp: 19 (04/21/17 0800)  BP: (!) 107/51 (04/21/17 0800)  SpO2: 100 % (04/21/17 0800)  Weight: 61.7 kg (136 lb) (04/20/17 0847)  Height: 5' 4" (162.6 cm) (04/20/17 0847)       Lumbar Puncture  Date/Time: 4/21/2017 2:33 PM  Location procedure was performed: [facility] OBSERVATION  Performed by: ANIKA MITCHELL  Authorized by: ANIKA MITCHELL   Assisting provider: DANIEL CARPIO  Pre-operative diagnosis:  Paresthesias  Post-operative diagnosis: Paresthesias  Consent Done: Yes  Indications: evaluation for infection (or inflammation)  Anesthesia: local infiltration    Anesthesia:  Anesthesia: local infiltration  Local Anesthetic: lidocaine 1% without epinephrine   Anesthetic total: 3 mL  Preparation: Patient was prepped and draped in the usual sterile fashion.  Lumbar space: L3-L4 interspace  Patient's position: sitting  Needle gauge: 18  Needle type: spinal needle - Quincke tip  Number of attempts: 1  Fluid appearance: clear  Tubes of fluid: 4  Total volume: 16 ml  Post-procedure: site cleaned and adhesive bandage applied  Patient tolerance: Patient tolerated the procedure well with no immediate complications          Anika Mitchell  4/21/2017  "

## 2017-04-21 NOTE — PLAN OF CARE
Problem: Patient Care Overview  Goal: Plan of Care Review  Outcome: Ongoing (interventions implemented as appropriate)  Pt aaox3, vss, no s/s of distress noted.  Pt c/o headache, tylenol administered.  Safety precautions maintained, pt remains free of falls.  Pt c/o n/t to fingertips and toes.  Call light within reach.  Family at bedside.

## 2017-04-22 ENCOUNTER — TELEPHONE (OUTPATIENT)
Dept: NEUROLOGY | Facility: CLINIC | Age: 42
End: 2017-04-22

## 2017-04-22 VITALS
RESPIRATION RATE: 16 BRPM | DIASTOLIC BLOOD PRESSURE: 57 MMHG | HEART RATE: 56 BPM | TEMPERATURE: 97 F | SYSTOLIC BLOOD PRESSURE: 100 MMHG | OXYGEN SATURATION: 100 % | WEIGHT: 136 LBS | HEIGHT: 64 IN | BODY MASS INDEX: 23.22 KG/M2

## 2017-04-22 DIAGNOSIS — G62.9 NEUROPATHY: Primary | ICD-10-CM

## 2017-04-22 LAB
ALBUMIN SERPL BCP-MCNC: 3.6 G/DL
ALP SERPL-CCNC: 45 U/L
ALT SERPL W/O P-5'-P-CCNC: 10 U/L
ANION GAP SERPL CALC-SCNC: 8 MMOL/L
ARSENIC BLD-MCNC: <1 NG/ML (ref 0–12)
AST SERPL-CCNC: 13 U/L
BASOPHILS # BLD AUTO: 0.02 K/UL
BASOPHILS NFR BLD: 0.3 %
BILIRUB SERPL-MCNC: 0.3 MG/DL
BUN SERPL-MCNC: 26 MG/DL
CADMIUM BLD-MCNC: <0.2 NG/ML (ref 0–4.9)
CALCIUM SERPL-MCNC: 8.4 MG/DL
CHLORIDE SERPL-SCNC: 108 MMOL/L
CITY: NORMAL
CO2 SERPL-SCNC: 22 MMOL/L
COUNTY: NORMAL
CREAT SERPL-MCNC: 0.8 MG/DL
DIFFERENTIAL METHOD: ABNORMAL
EOSINOPHIL # BLD AUTO: 0.6 K/UL
EOSINOPHIL NFR BLD: 9.6 %
ERYTHROCYTE [DISTWIDTH] IN BLOOD BY AUTOMATED COUNT: 12.6 %
EST. GFR  (AFRICAN AMERICAN): >60 ML/MIN/1.73 M^2
EST. GFR  (NON AFRICAN AMERICAN): >60 ML/MIN/1.73 M^2
GLUCOSE SERPL-MCNC: 92 MG/DL
GUARDIAN FIRST NAME: NORMAL
GUARDIAN LAST NAME: NORMAL
HCT VFR BLD AUTO: 36.8 %
HGB BLD-MCNC: 12.8 G/DL
HOME PHONE: NORMAL
LEAD BLD-MCNC: <1 MCG/DL (ref 0–4.9)
LYMPHOCYTES # BLD AUTO: 1.6 K/UL
LYMPHOCYTES NFR BLD: 24.2 %
MAGNESIUM SERPL-MCNC: 1.7 MG/DL
MCH RBC QN AUTO: 30.6 PG
MCHC RBC AUTO-ENTMCNC: 34.8 %
MCV RBC AUTO: 88 FL
MERCURY BLD-MCNC: 2 NG/ML (ref 0–9)
MONOCYTES # BLD AUTO: 0.4 K/UL
MONOCYTES NFR BLD: 5.7 %
NEUTROPHILS # BLD AUTO: 3.9 K/UL
NEUTROPHILS NFR BLD: 60 %
PHOSPHATE SERPL-MCNC: 4.2 MG/DL
PLATELET # BLD AUTO: 163 K/UL
PMV BLD AUTO: 9.1 FL
POTASSIUM SERPL-SCNC: 4.6 MMOL/L
PROT SERPL-MCNC: 6.4 G/DL
RACE: NORMAL
RBC # BLD AUTO: 4.18 M/UL
SODIUM SERPL-SCNC: 138 MMOL/L
STATE: NORMAL
STREET ADDRESS: NORMAL
VENOUS/CAPILLARY: NORMAL
WBC # BLD AUTO: 6.44 K/UL
ZIP: NORMAL

## 2017-04-22 PROCEDURE — G0378 HOSPITAL OBSERVATION PER HR: HCPCS

## 2017-04-22 PROCEDURE — 85025 COMPLETE CBC W/AUTO DIFF WBC: CPT

## 2017-04-22 PROCEDURE — 99217 PR OBSERVATION CARE DISCHARGE: CPT | Mod: ,,, | Performed by: PHYSICIAN ASSISTANT

## 2017-04-22 PROCEDURE — 25000003 PHARM REV CODE 250: Performed by: PHYSICIAN ASSISTANT

## 2017-04-22 PROCEDURE — 99214 OFFICE O/P EST MOD 30 MIN: CPT | Mod: ,,, | Performed by: PSYCHIATRY & NEUROLOGY

## 2017-04-22 PROCEDURE — 80053 COMPREHEN METABOLIC PANEL: CPT

## 2017-04-22 PROCEDURE — 84100 ASSAY OF PHOSPHORUS: CPT

## 2017-04-22 PROCEDURE — 36415 COLL VENOUS BLD VENIPUNCTURE: CPT

## 2017-04-22 PROCEDURE — 83735 ASSAY OF MAGNESIUM: CPT

## 2017-04-22 RX ORDER — GABAPENTIN 100 MG/1
100 CAPSULE ORAL 3 TIMES DAILY PRN
Qty: 90 CAPSULE | Refills: 5 | Status: SHIPPED | OUTPATIENT
Start: 2017-04-22 | End: 2023-09-20 | Stop reason: ALTCHOICE

## 2017-04-22 RX ORDER — PERPHENAZINE 16 MG
1 TABLET ORAL DAILY
Qty: 30 EACH | Refills: 5 | COMMUNITY
Start: 2017-04-22 | End: 2023-09-20 | Stop reason: ALTCHOICE

## 2017-04-22 RX ADMIN — SODIUM CHLORIDE, PRESERVATIVE FREE 3 ML: 5 INJECTION INTRAVENOUS at 06:04

## 2017-04-22 RX ADMIN — ALPRAZOLAM 0.25 MG: 0.25 TABLET ORAL at 12:04

## 2017-04-22 RX ADMIN — CYANOCOBALAMIN TAB 250 MCG 250 MCG: 250 TAB at 08:04

## 2017-04-22 NOTE — PROGRESS NOTES
Pt discharged from unit.  Pt aaox3, vss, no s/s of distress noted.  Pt denies pain.  Discharge instructions given to pt and she verbalized understanding.  Home meds and f/u appts reviewed as well.  IV removed from lac w/ catheter intact, no redness or swelling noted to area.  Pt refused w/c and ambulated off unit.  Pt was accompanied by spouse.

## 2017-04-22 NOTE — PROGRESS NOTES
Name: Eunice Hickman  MRN: [id number]   CSN: [id number]      Date: 04/22/2017    Referring physician:  Aaareferral Self  No address on file    Chief Complaint / Interval History: Numbness (Pt reports bilateral facial, hands, and feet numbness x 1 month Pt of Dr. Koch. )    - well overnight  - no new complaints  - affirms previous tendencies to waking up with numb hands sometimes, mother may have similar    History of Present Illness (HPI):  HPI:   42 year old female with no significant PMHx presents to the ED with worsening paraesthesias in hands and feet. Neurology consulted for management. Patient claims she has had progressive tingling, burning, myalagia, arthralgia, and weakness since skiing over [place], developed excrutiating toe pain while wearing ski boots, symptoms continued after returning home, new boots and shoes did not provide any relief. She later developed tingling in the hands and fingers, more distally. Patient seen in recently in clinic by Dr. Koch on 4/4/17, began autoimmune/infectious work-up with no pertinent findings so far, also saw a neurologist near  (Dr. Ines Narvaez) and had EMG/NCV, results were unimpressive. Patient now presenting with worsening symptoms as the burning and tingling is now affecting her calf and forearm, although symptoms are intermittent and resolve spontaneously. She also noticed worsening burning pain in her toes after standing up for 20-30 minutes. She denies any bowel/bladder incontinence or saddle paresthesias.        Past Medical History: The patient  has no past medical history on file.    Social History: The patient  reports that she has never smoked. She has never used smokeless tobacco. She reports that she drinks alcohol. She reports that she does not use illicit drugs.    Family History: Their family history includes No Known Problems in her brother, father, maternal aunt, maternal grandfather, maternal grandmother, maternal uncle, mother,  "paternal aunt, paternal grandfather, paternal grandmother, paternal uncle, and sister. There is no history of Amblyopia, Blindness, Cancer, Cataracts, Diabetes, Glaucoma, Hypertension, Macular degeneration, Retinal detachment, Strabismus, Stroke, or Thyroid disease.    Allergies: Review of patient's allergies indicates no known allergies.     Meds:   No current facility-administered medications on file prior to encounter.      Current Outpatient Prescriptions on File Prior to Encounter   Medication Sig Dispense Refill    CYANOCOBALAMIN, VITAMIN B-12, (VITAMIN B-12 ORAL) Take by mouth.      naproxen sodium (ANAPROX) 550 MG tablet TK 1 T PO BID WF  0    zolpidem (AMBIEN) 5 MG Tab TK 1 T PO QHS PRF INSOMNIA  0    prenatal vit #108-iron-FA 30 mg iron- 800 mcg Tab Take by mouth.         Exam:  BP (!) 93/57 (BP Location: Left arm, Patient Position: Lying, BP Method: Automatic)  Pulse 73  Temp 96.9 °F (36.1 °C)  Resp 16  Ht 5' 4" (1.626 m)  Wt 61.7 kg (136 lb)  LMP 04/06/2017 (Approximate)  SpO2 100%  Breastfeeding? No  BMI 23.34 kg/m2    Deferred for counseling excpet some pain to palpation with pressure points on legs.    Laboratory/Radiological:  - Results:  Admission on 04/20/2017   Component Date Value Ref Range Status    WBC 04/20/2017 6.66  3.90 - 12.70 K/uL Final    RBC 04/20/2017 4.37  4.00 - 5.40 M/uL Final    Hemoglobin 04/20/2017 13.3  12.0 - 16.0 g/dL Final    Hematocrit 04/20/2017 38.2  37.0 - 48.5 % Final    MCV 04/20/2017 87  82 - 98 fL Final    MCH 04/20/2017 30.4  27.0 - 31.0 pg Final    MCHC 04/20/2017 34.8  32.0 - 36.0 % Final    RDW 04/20/2017 12.5  11.5 - 14.5 % Final    Platelets 04/20/2017 199  150 - 350 K/uL Final    MPV 04/20/2017 9.1* 9.2 - 12.9 fL Final    Gran # 04/20/2017 4.0  1.8 - 7.7 K/uL Final    Lymph # 04/20/2017 1.7  1.0 - 4.8 K/uL Final    Mono # 04/20/2017 0.3  0.3 - 1.0 K/uL Final    Eos # 04/20/2017 0.6* 0.0 - 0.5 K/uL Final    Baso # 04/20/2017 0.03  " 0.00 - 0.20 K/uL Final    Gran% 04/20/2017 60.0  38.0 - 73.0 % Final    Lymph% 04/20/2017 25.8  18.0 - 48.0 % Final    Mono% 04/20/2017 4.5  4.0 - 15.0 % Final    Eosinophil% 04/20/2017 9.0* 0.0 - 8.0 % Final    Basophil% 04/20/2017 0.5  0.0 - 1.9 % Final    Differential Method 04/20/2017 Automated   Final    Sodium 04/20/2017 139  136 - 145 mmol/L Final    Potassium 04/20/2017 4.3  3.5 - 5.1 mmol/L Final    Chloride 04/20/2017 104  95 - 110 mmol/L Final    CO2 04/20/2017 24  23 - 29 mmol/L Final    Glucose 04/20/2017 85  70 - 110 mg/dL Final    BUN, Bld 04/20/2017 16  6 - 20 mg/dL Final    Creatinine 04/20/2017 0.8  0.5 - 1.4 mg/dL Final    Calcium 04/20/2017 9.2  8.7 - 10.5 mg/dL Final    Total Protein 04/20/2017 6.9  6.0 - 8.4 g/dL Final    Albumin 04/20/2017 4.1  3.5 - 5.2 g/dL Final    Total Bilirubin 04/20/2017 0.6  0.1 - 1.0 mg/dL Final    Alkaline Phosphatase 04/20/2017 48* 55 - 135 U/L Final    AST 04/20/2017 16  10 - 40 U/L Final    ALT 04/20/2017 14  10 - 44 U/L Final    Anion Gap 04/20/2017 11  8 - 16 mmol/L Final    eGFR if African American 04/20/2017 >60.0  >60 mL/min/1.73 m^2 Final    eGFR if non African American 04/20/2017 >60.0  >60 mL/min/1.73 m^2 Final    Prothrombin Time 04/20/2017 10.9  9.0 - 12.5 sec Final    INR 04/20/2017 1.0  0.8 - 1.2 Final    Sodium 04/21/2017 138  136 - 145 mmol/L Final    Potassium 04/21/2017 3.7  3.5 - 5.1 mmol/L Final    Chloride 04/21/2017 106  95 - 110 mmol/L Final    CO2 04/21/2017 26  23 - 29 mmol/L Final    Glucose 04/21/2017 91  70 - 110 mg/dL Final    BUN, Bld 04/21/2017 17  6 - 20 mg/dL Final    Creatinine 04/21/2017 0.7  0.5 - 1.4 mg/dL Final    Calcium 04/21/2017 8.6* 8.7 - 10.5 mg/dL Final    Total Protein 04/21/2017 5.9* 6.0 - 8.4 g/dL Final    Albumin 04/21/2017 3.5  3.5 - 5.2 g/dL Final    Total Bilirubin 04/21/2017 0.5  0.1 - 1.0 mg/dL Final    Alkaline Phosphatase 04/21/2017 44* 55 - 135 U/L Final    AST 04/21/2017  13  10 - 40 U/L Final    ALT 04/21/2017 11  10 - 44 U/L Final    Anion Gap 04/21/2017 6* 8 - 16 mmol/L Final    eGFR if African American 04/21/2017 >60.0  >60 mL/min/1.73 m^2 Final    eGFR if non African American 04/21/2017 >60.0  >60 mL/min/1.73 m^2 Final    Magnesium 04/21/2017 1.7  1.6 - 2.6 mg/dL Final    Phosphorus 04/21/2017 4.0  2.7 - 4.5 mg/dL Final    WBC 04/21/2017 6.07  3.90 - 12.70 K/uL Final    RBC 04/21/2017 4.00  4.00 - 5.40 M/uL Final    Hemoglobin 04/21/2017 12.2  12.0 - 16.0 g/dL Final    Hematocrit 04/21/2017 35.2* 37.0 - 48.5 % Final    MCV 04/21/2017 88  82 - 98 fL Final    MCH 04/21/2017 30.5  27.0 - 31.0 pg Final    MCHC 04/21/2017 34.7  32.0 - 36.0 % Final    RDW 04/21/2017 12.5  11.5 - 14.5 % Final    Platelets 04/21/2017 162  150 - 350 K/uL Final    MPV 04/21/2017 9.2  9.2 - 12.9 fL Final    Gran # 04/21/2017 3.0  1.8 - 7.7 K/uL Final    Lymph # 04/21/2017 1.8  1.0 - 4.8 K/uL Final    Mono # 04/21/2017 0.5  0.3 - 1.0 K/uL Final    Eos # 04/21/2017 0.8* 0.0 - 0.5 K/uL Final    Baso # 04/21/2017 0.03  0.00 - 0.20 K/uL Final    Gran% 04/21/2017 48.9  38.0 - 73.0 % Final    Lymph% 04/21/2017 29.2  18.0 - 48.0 % Final    Mono% 04/21/2017 8.2  4.0 - 15.0 % Final    Eosinophil% 04/21/2017 13.0* 0.0 - 8.0 % Final    Basophil% 04/21/2017 0.5  0.0 - 1.9 % Final    Differential Method 04/21/2017 Automated   Final    Sed Rate 04/21/2017 5  0 - 20 mm/Hr Final    CRP, High Sensitivity 04/21/2017 0.35  0.00 - 3.19 mg/L Final    Hemoglobin A1C 04/21/2017 5.3  4.5 - 6.2 % Final    Estimated Avg Glucose 04/21/2017 105  68 - 131 mg/dL Final    Heme Aliquot 04/21/2017 4.0  mL Final    Appearance, CSF 04/21/2017 Clear  Clear Final    Color, CSF 04/21/2017 Colorless  Colorless Final    WBC, CSF 04/21/2017 0  0 - 5 /cu mm Final    RBC, CSF 04/21/2017 5* 0 /cu mm Final    Heme Aliquot 04/21/2017 5.0  mL Final    Appearance, CSF 04/21/2017 Clear  Clear Final    Color, CSF  04/21/2017 Colorless  Colorless Final    WBC, CSF 04/21/2017 0  0 - 5 /cu mm Final    RBC, CSF 04/21/2017 1* 0 /cu mm Final    Glucose, CSF 04/21/2017 60  40 - 70 mg/dL Final    Protein, CSF 04/21/2017 20  15 - 40 mg/dL Final    CSF CULTURE 04/21/2017 No Growth to date   Preliminary    Gram Stain Result 04/21/2017 Cytospin indicates:   Final    Gram Stain Result 04/21/2017 No WBC's   Final    Gram Stain Result 04/21/2017 No organisms seen   Final    Sodium 04/22/2017 138  136 - 145 mmol/L Final    Potassium 04/22/2017 4.6  3.5 - 5.1 mmol/L Final    Chloride 04/22/2017 108  95 - 110 mmol/L Final    CO2 04/22/2017 22* 23 - 29 mmol/L Final    Glucose 04/22/2017 92  70 - 110 mg/dL Final    BUN, Bld 04/22/2017 26* 6 - 20 mg/dL Final    Creatinine 04/22/2017 0.8  0.5 - 1.4 mg/dL Final    Calcium 04/22/2017 8.4* 8.7 - 10.5 mg/dL Final    Total Protein 04/22/2017 6.4  6.0 - 8.4 g/dL Final    Albumin 04/22/2017 3.6  3.5 - 5.2 g/dL Final    Total Bilirubin 04/22/2017 0.3  0.1 - 1.0 mg/dL Final    Alkaline Phosphatase 04/22/2017 45* 55 - 135 U/L Final    AST 04/22/2017 13  10 - 40 U/L Final    ALT 04/22/2017 10  10 - 44 U/L Final    Anion Gap 04/22/2017 8  8 - 16 mmol/L Final    eGFR if African American 04/22/2017 >60.0  >60 mL/min/1.73 m^2 Final    eGFR if non African American 04/22/2017 >60.0  >60 mL/min/1.73 m^2 Final    Magnesium 04/22/2017 1.7  1.6 - 2.6 mg/dL Final    Phosphorus 04/22/2017 4.2  2.7 - 4.5 mg/dL Final    WBC 04/22/2017 6.44  3.90 - 12.70 K/uL Final    RBC 04/22/2017 4.18  4.00 - 5.40 M/uL Final    Hemoglobin 04/22/2017 12.8  12.0 - 16.0 g/dL Final    Hematocrit 04/22/2017 36.8* 37.0 - 48.5 % Final    MCV 04/22/2017 88  82 - 98 fL Final    MCH 04/22/2017 30.6  27.0 - 31.0 pg Final    MCHC 04/22/2017 34.8  32.0 - 36.0 % Final    RDW 04/22/2017 12.6  11.5 - 14.5 % Final    Platelets 04/22/2017 163  150 - 350 K/uL Final    MPV 04/22/2017 9.1* 9.2 - 12.9 fL Final    Gran #  04/22/2017 3.9  1.8 - 7.7 K/uL Final    Lymph # 04/22/2017 1.6  1.0 - 4.8 K/uL Final    Mono # 04/22/2017 0.4  0.3 - 1.0 K/uL Final    Eos # 04/22/2017 0.6* 0.0 - 0.5 K/uL Final    Baso # 04/22/2017 0.02  0.00 - 0.20 K/uL Final    Gran% 04/22/2017 60.0  38.0 - 73.0 % Final    Lymph% 04/22/2017 24.2  18.0 - 48.0 % Final    Mono% 04/22/2017 5.7  4.0 - 15.0 % Final    Eosinophil% 04/22/2017 9.6* 0.0 - 8.0 % Final    Basophil% 04/22/2017 0.3  0.0 - 1.9 % Final    Differential Method 04/22/2017 Automated   Final   Lab Visit on 04/04/2017   Component Date Value Ref Range Status    Vitamin B-12 04/04/2017 1025* 210 - 950 pg/mL Final    RPR 04/04/2017 Non-reactive  Non-reactive Final    Protein, Serum 04/04/2017 6.8  6.0 - 8.4 g/dL Final    Albumin grams/dl 04/04/2017 4.16  3.35 - 5.55 g/dL Final    Alpha-1 grams/dl 04/04/2017 0.26  0.17 - 0.41 g/dL Final    Alpha-2 grams/dl 04/04/2017 0.64  0.43 - 0.99 g/dL Final    Beta grams/dl 04/04/2017 0.64  0.50 - 1.10 g/dL Final    Gamma grams/dl 04/04/2017 1.10  0.67 - 1.58 g/dL Final    Thiamine 04/04/2017 41  38 - 122 ug/L Final    Vitamin B6 04/04/2017 9  5 - 50 ug/L Final    Vit D, 25-Hydroxy 04/04/2017 35  30 - 96 ng/mL Final    Anti-SSA Antibody 04/04/2017 1.03  0.00 - 19.99 EU Final    Anti-SSA Interpretation 04/04/2017 Negative  Negative Final    Anti-SSB Antibody 04/04/2017 0.10  0.00 - 19.99 EU Final    Anti-SSB Interpretation 04/04/2017 Negative  Negative Final    Cytoplasmic Neutrophilic Ab 04/04/2017 <1:20  <1:20 Titer Final    Perinuclear (P-ANCA) 04/04/2017 <1:20  <1:20 Titer Final    Angio Convert Enzyme 04/04/2017 16  8 - 53 U/L Final    GRUPO Screen 04/04/2017 Negative <1:160  Negative <1:160 Final    Methlymalonic Acid 04/04/2017 <0.10  <0.40 umol/L Final    Homocysteine 04/04/2017 4.2  4.0 - 15.5 umol/L Final    Antigliadin Abs, IgA 04/04/2017 6  <20 UNITS Final    Antigliadin Ab IgG 04/04/2017 3  <20 UNITS Final    TTG IgA  04/04/2017 5  <20 UNITS Final    TTG IgG 04/04/2017 3  <20 UNITS Final    Immunoglobulin A (IgA) 04/04/2017 205  70 - 400 mg/dL Final    ds DNA Ab 04/04/2017 Negative 1:10  Negative 1:10 Final   Admission on 03/27/2017, Discharged on 03/27/2017   Component Date Value Ref Range Status    POC Glucose 03/27/2017 115* 70 - 110 mg/dL Final    POC BUN 03/27/2017 11  6 - 30 mg/dL Final    POC Creatinine 03/27/2017 0.5  0.5 - 1.4 mg/dL Final    POC Sodium 03/27/2017 141  136 - 145 mmol/L Final    POC Potassium 03/27/2017 3.1* 3.5 - 5.1 mmol/L Final    POC Chloride 03/27/2017 102  95 - 110 mmol/L Final    POC TCO2 (MEASURED) 03/27/2017 25  23 - 29 mmol/L Final    POC Ionized Calcium 03/27/2017 1.07  1.06 - 1.42 mmol/L Final    POC Hematocrit 03/27/2017 39  36 - 54 %PCV Final    Sample 03/27/2017 MITCH   Final       - Independent review of images: done    Diagnoses:          Query small fiber painful neuropathy or something akin to HNPP, but also consider fibromyalgia.  Myofascial pain syndrome or other rheum not ruled out, but hard to explain the length dependent sensory changes.    Medical Decision Making:  LP and MRI's benign  - gabapentin 100 TID PRN pain  - alpha lipoic acid 600 mg daily  - outpatient f/u [facility] and repeat EMG perhaps  - OK for d/c now      Harley Koch MD, MPH  Division of Movement and Memory Disorders  [facility]  [phone]

## 2017-04-22 NOTE — PLAN OF CARE
Problem: Patient Care Overview  Goal: Plan of Care Review  Outcome: Ongoing (interventions implemented as appropriate)  Pt progressing towards goals.headache eased with tylenol.tolerated lumbar puncture yesterday.fluids forced.slept well during the night.safety precautions maintained.pt free of falls or injuries.continue plan of care.

## 2017-04-22 NOTE — DISCHARGE SUMMARY
[facility] Hospital Medicine  Discharge Summary      Patient Name: Eunice Hickman  MRN: [id number]  Admission Date: 4/20/2017  Hospital Length of Stay: 0 days  Discharge Date and Time:  04/22/2017 12:27 PM  Attending Physician: Nenita Calhoun MD   Discharging Provider: Neetu Mcgovern PA-C  Primary Care Provider: Abigail Roberts MD  [facility] Medicine Team: [facility] MED F Neetu Mcgovern PA-C    HPI:   42 year old female with no significant PMHx being admitted to observation for further evaluation of worsening numbness. Patient says the numbness began in her toes bilaterally during a ski trip at the end of February. Patient states since then, the numbness has spread through her feet and presents in her hands. Patient denies alleviating factors. She states that being on her feet for 30 minutes or more aggravates the numbness. Patient endorses associated anxiety, SOB, and weakness with movement. Patient's father had an incidental finding of 2 lesions on MRI that was concerning for MS, but he never had symptoms.    Patient was referred to the ED by neurology for further workup. In the ED, CBC, CMP were unremarkable; Alk Phos 48. Neurology was consulted, ordered and MRI of brain, c-spine, and t-spine; appreciate further recommendations.     Patient denies CP, N/V, paralysis, slurred speech, trouble breathing/swallowing.        * No surgery found *      Indwelling Lines/Drains at time of discharge:   Lines/Drains/Airways          No matching active lines, drains, or airways        Hospital Course:   Patient admitted to observation for further evaluation of progressive numbness. Neurology was consulted, MRI of brain, c-spine, t-spine ordered.   4/21: MRI unremarkable. Per neuro, LP today. ESR/CRP WNL. Lyme studies, cryoglobulins, and heavy metals pending.   4/22: Per neuro, LP and MRI benign. Continue gabapentin and alpha lipoic acid. Pt will follow up with Dr. Alvarez as an outpatient for  ?repeat EMG.       Consults:   Consults         Status Ordering Provider     Inpatient consult to neurology  Once     Provider:  (Not yet assigned)    Completed SISSY SONG          Significant Diagnostic Studies: Labs: CSF    Radiology: MRI: Brain, C-spine, T-spine    Pending Diagnostic Studies:     Procedure Component Value Units Date/Time    ACE, CSF [[id number]] Collected:  04/21/17 1451    Order Status:  Sent Lab Status:  In process Updated:  04/21/17 1611    Specimen:  CSF (Spinal Fluid) from Cerebrospinal Fluid     B. burgdorferi Abs (Lyme Disease) [[id number]] Collected:  04/21/17 1042    Order Status:  Sent Lab Status:  In process Updated:  04/21/17 1107    Specimen:  Blood from Blood     Cryoglobulin [[id number]] Collected:  04/21/17 1042    Order Status:  Sent Lab Status:  In process Updated:  04/21/17 1604    Specimen:  Blood from Blood     Freeze and Hold,  [[id number]] Collected:  04/21/17 1451    Order Status:  Sent Lab Status:  No result     Specimen:  CSF (Spinal Fluid) from Cerebrospinal Fluid     Heavy Metals Screen, Blood (Quantitative) [[id number]] Collected:  04/21/17 1042    Order Status:  Sent Lab Status:  In process Updated:  04/21/17 1107    Specimen:  Blood from Blood     Lyme Disease Serology, CSF [[id number]] Collected:  04/21/17 1451    Order Status:  Sent Lab Status:  In process Updated:  04/21/17 1611    Specimen:  CSF (Spinal Fluid) from Cerebrospinal Fluid     Lyme Disease Western Blot [[id number]] Collected:  04/21/17 1042    Order Status:  Sent Lab Status:  In process Updated:  04/21/17 1107    Specimen:  Blood from Blood     MS Profile [[id number]] Collected:  04/21/17 1451    Order Status:  Sent Lab Status:  In process Updated:  04/21/17 1611    Specimen:  CSF (Spinal Fluid) from Cerebrospinal Fluid         Final Active Diagnoses:    Diagnosis Date Noted POA    PRINCIPAL PROBLEM:  Numbness [R20.0] 04/20/2017 Yes      Problems Resolved During this Admission:    Diagnosis Date  Noted Date Resolved POA      * Numbness  - Patient referred to the ED by Neurology for workup of numbness. Patient's previous workup outpatient included extensive rheumatologic, vitamin studies, and EMG from OSH, awaiting results of those studies. Per patient, EMG studies were non contributory.  - Neurology consulted and appreciate recs  - MRI brain, c-spine, t-spine ordered  - Continue home Xanax PRN for anxiety and one time dose prior to MRI  4/21: MRI unremarkable. Per neuro, LP today. ESR/CRP WNL. Lyme studies, cryoglobulins, and heavy metals pending.   4/22: Per neuro, LP and MRI benign. Continue gabapentin and alpha lipoic acid. Pt will follow up with Dr. Alvarez as an outpatient for ?repeat EMG.      Discharged Condition: stable    Disposition: Home or Self Care    Follow Up:  Follow-up Information     Follow up with Jameson Alvarez MD In 2 weeks.    Specialty:  Neurology    Why:  repeat EMG    Contact information:    [address] [place] [address] [phone]          Patient Instructions:     Diet general     Activity as tolerated     Call MD for:  temperature >100.4     Call MD for:  severe uncontrolled pain     Call MD for:  redness, tenderness, or signs of infection (pain, swelling, redness, odor or green/yellow discharge around incision site)     Call MD for:  persistent dizziness, light-headedness, or visual disturbances     Call MD for:  increased confusion or weakness       Medications:  Reconciled Home Medications:   Current Discharge Medication List      CONTINUE these medications which have NOT CHANGED    Details   CYANOCOBALAMIN, VITAMIN B-12, (VITAMIN B-12 ORAL) Take by mouth.      naproxen sodium (ANAPROX) 550 MG tablet TK 1 T PO BID WF  Refills: 0      zolpidem (AMBIEN) 5 MG Tab TK 1 T PO QHS PRF INSOMNIA  Refills: 0      alpha lipoic acid 600 mg Cap Take 1 tablet by mouth once daily.  Qty: 30 each, Refills: 5      gabapentin (NEURONTIN) 100 MG capsule Take 1 capsule (100  mg total) by mouth 3 (three) times daily as needed (pain).  Qty: 90 capsule, Refills: 5      prenatal vit #108-iron-FA 30 mg iron- 800 mcg Tab Take by mouth.           Time spent on the discharge of patient: 20 minutes    Neetu Mcgovern PA-C  Department of Hospital Medicine  [facility]

## 2017-04-22 NOTE — TELEPHONE ENCOUNTER
Discharging today, sending gabapentin to [facility] now.  Also rec  daily.

## 2017-04-22 NOTE — ASSESSMENT & PLAN NOTE
- Patient referred to the ED by Neurology for workup of numbness. Patient's previous workup outpatient included extensive rheumatologic, vitamin studies, and EMG from OSH, awaiting results of those studies. Per patient, EMG studies were non contributory.  - Neurology consulted and appreciate recs  - MRI brain, c-spine, t-spine ordered  - Continue home Xanax PRN for anxiety and one time dose prior to MRI  4/21: MRI unremarkable. Per neuro, LP today. ESR/CRP WNL. Lyme studies, cryoglobulins, and heavy metals pending.   4/22: Per neuro, LP and MRI benign. Continue gabapentin and alpha lipoic acid. Pt will follow up with Dr. Alvarez as an outpatient for ?repeat EMG.

## 2017-04-22 NOTE — PLAN OF CARE
Problem: Patient Care Overview  Goal: Plan of Care Review  Outcome: Ongoing (interventions implemented as appropriate)  Pt aaox3, vss, no s/s of distress noted.  Pt denies pain at this time.  Safety precautions maintained, pt remains free of falls.  Pt c/o n/t to fingertips and toes.  Call light within reach.  Family member at bedside.

## 2017-04-23 ENCOUNTER — NURSE TRIAGE (OUTPATIENT)
Dept: ADMINISTRATIVE | Facility: CLINIC | Age: 42
End: 2017-04-23

## 2017-04-23 NOTE — TELEPHONE ENCOUNTER
"  Reason for Disposition   [1] Headache AND [2] after spinal (epidural) anesthesia AND [3] not severe    Answer Assessment - Initial Assessment Questions  1. SYMPTOM: "What's the main symptom you're concerned about?" (e.g., pain, fever, vomiting)      Moderate headache  2. ONSET: "When did ________  start?"      Last night   3. SURGERY: "What surgery was performed?"      LP  4. DATE of SURGERY: "When was surgery performed?"       4/20  5. ANESTHESIA: " What type of anesthesia did you have?" (e.g., general, spinal, epidural, local)        6. PAIN: "Is there any pain?" If so, ask: "How bad is it?"  (Scale 1-10; or mild, moderate, severe)      Moderate   7. FEVER: "Do you have a fever?" If so, ask: "What is your temperature, how was it measured, and when did it start?"      No   8. VOMITING: "Is there any vomiting?" If yes, ask: "How many times?"      Unanswered    9. BLEEDING: "Is there any bleeding?" If so, ask: "How much?" and "Where?"      No, dressing came off yesterday   10. OTHER SYMPTOMS: "Do you have any other symptoms?" (e.g., drainage from wound, painful urination, constipation)        Not at time of call    Protocols used: ST POST-OP SYMPTOMS AND [id number]    "

## 2017-04-24 LAB
B BURGDOR AB PATRN SER IB-IMP: NORMAL
B BURGDOR IGG PATRN SER IB-IMP: NORMAL KDA
B BURGDOR IGG SER QL IB: NEGATIVE
B BURGDOR IGM PATRN SER IB-IMP: NORMAL KDA
B BURGDOR IGM SER QL IB: NEGATIVE

## 2017-04-25 ENCOUNTER — HOSPITAL ENCOUNTER (OUTPATIENT)
Facility: HOSPITAL | Age: 42
Discharge: HOME | End: 2017-04-27
Attending: EMERGENCY MEDICINE | Admitting: HOSPITALIST
Payer: COMMERCIAL

## 2017-04-25 DIAGNOSIS — R51.9 HEADACHE: ICD-10-CM

## 2017-04-25 DIAGNOSIS — F41.9 ANXIETY: ICD-10-CM

## 2017-04-25 DIAGNOSIS — N83.202 CYST OF LEFT OVARY: ICD-10-CM

## 2017-04-25 DIAGNOSIS — R20.0 NUMBNESS: ICD-10-CM

## 2017-04-25 DIAGNOSIS — G97.1 POST-DURAL PUNCTURE HEADACHE: Primary | ICD-10-CM

## 2017-04-25 DIAGNOSIS — Z98.890 HISTORY OF LUMBAR PUNCTURE: ICD-10-CM

## 2017-04-25 LAB
ACE CSF-CCNC: 1.1 U/L (ref 0–2.5)
ALBUMIN CSF-MCNC: 9.1 MG/DL
ALBUMIN SERPL-MCNC: 3870 MG/DL
ANION GAP SERPL CALC-SCNC: 7 MMOL/L
B BURGDOR AB CSF QL IA: NEGATIVE
B BURGDOR AB SER QL: 0.16 INDEX VALUE
B-HCG UR QL: NEGATIVE
BASOPHILS # BLD AUTO: 0.02 K/UL
BASOPHILS NFR BLD: 0.2 %
BUN SERPL-MCNC: 10 MG/DL (ref 6–30)
BUN SERPL-MCNC: 8 MG/DL
CALCIUM SERPL-MCNC: 8.3 MG/DL
CHLORIDE SERPL-SCNC: 102 MMOL/L (ref 95–110)
CHLORIDE SERPL-SCNC: 111 MMOL/L
CO2 SERPL-SCNC: 23 MMOL/L
CREAT SERPL-MCNC: 0.7 MG/DL
CREAT SERPL-MCNC: 0.7 MG/DL (ref 0.5–1.4)
CTP QC/QA: YES
DIFFERENTIAL METHOD: ABNORMAL
EOSINOPHIL # BLD AUTO: 0.5 K/UL
EOSINOPHIL NFR BLD: 5.9 %
ERYTHROCYTE [DISTWIDTH] IN BLOOD BY AUTOMATED COUNT: 12.5 %
EST. GFR  (AFRICAN AMERICAN): >60 ML/MIN/1.73 M^2
EST. GFR  (NON AFRICAN AMERICAN): >60 ML/MIN/1.73 M^2
GLUCOSE SERPL-MCNC: 95 MG/DL (ref 70–110)
GLUCOSE SERPL-MCNC: 97 MG/DL
HCT VFR BLD AUTO: 35.3 %
HCT VFR BLD CALC: 39 %PCV (ref 36–54)
HGB BLD-MCNC: 12 G/DL
IGG CSF-MCNC: 0.9 MG/DL
IGG SERPL-MCNC: 929 MG/DL
IGG SYNTH RATE SER+CSF CALC-MRATE: 0 MG/24 H
IGG/ALB CLEAR SER+CSF-RTO: 0.42
IGG/ALB CSF: 0.1 {RATIO}
IGG/ALB SER: 0.24 {RATIO}
LYMPHOCYTES # BLD AUTO: 2.6 K/UL
LYMPHOCYTES NFR BLD: 28.9 %
MAGNESIUM SERPL-MCNC: 1.8 MG/DL
MCH RBC QN AUTO: 30.2 PG
MCHC RBC AUTO-ENTMCNC: 34 %
MCV RBC AUTO: 89 FL
MONOCYTES # BLD AUTO: 0.5 K/UL
MONOCYTES NFR BLD: 5.2 %
NEUTROPHILS # BLD AUTO: 5.5 K/UL
NEUTROPHILS NFR BLD: 59.6 %
OLIGOCLONAL BANDS CSF ELPH-IMP: 0 BANDS
OLIGOCLONAL BANDS CSF ELPH-IMP: 0 BANDS
OLIGOCLONAL BANDS SERPL: 0 BANDS
PHOSPHATE SERPL-MCNC: 2.8 MG/DL
PLATELET # BLD AUTO: 177 K/UL
PMV BLD AUTO: 9.1 FL
POC IONIZED CALCIUM: 1.14 MMOL/L (ref 1.06–1.42)
POC TCO2 (MEASURED): 24 MMOL/L (ref 23–29)
POTASSIUM BLD-SCNC: 3.9 MMOL/L (ref 3.5–5.1)
POTASSIUM SERPL-SCNC: 3.4 MMOL/L
RBC # BLD AUTO: 3.97 M/UL
SAMPLE: NORMAL
SODIUM BLD-SCNC: 140 MMOL/L (ref 136–145)
SODIUM SERPL-SCNC: 141 MMOL/L
WBC # BLD AUTO: 9.15 K/UL

## 2017-04-25 PROCEDURE — 25000003 PHARM REV CODE 250: Performed by: PHYSICIAN ASSISTANT

## 2017-04-25 PROCEDURE — A9585 GADOBUTROL INJECTION: HCPCS | Performed by: EMERGENCY MEDICINE

## 2017-04-25 PROCEDURE — G0378 HOSPITAL OBSERVATION PER HR: HCPCS

## 2017-04-25 PROCEDURE — 99284 EMERGENCY DEPT VISIT MOD MDM: CPT | Mod: ,,, | Performed by: PHYSICIAN ASSISTANT

## 2017-04-25 PROCEDURE — 99245 OFF/OP CONSLTJ NEW/EST HI 55: CPT | Mod: ,,, | Performed by: PSYCHIATRY & NEUROLOGY

## 2017-04-25 PROCEDURE — 99285 EMERGENCY DEPT VISIT HI MDM: CPT | Mod: 25

## 2017-04-25 PROCEDURE — 25500020 PHARM REV CODE 255: Performed by: EMERGENCY MEDICINE

## 2017-04-25 PROCEDURE — 25000003 PHARM REV CODE 250: Performed by: EMERGENCY MEDICINE

## 2017-04-25 PROCEDURE — 96374 THER/PROPH/DIAG INJ IV PUSH: CPT

## 2017-04-25 PROCEDURE — 99220 PR INITIAL OBSERVATION CARE,LEVL III: CPT | Mod: ,,, | Performed by: PHYSICIAN ASSISTANT

## 2017-04-25 PROCEDURE — 86256 FLUORESCENT ANTIBODY TITER: CPT

## 2017-04-25 PROCEDURE — 83735 ASSAY OF MAGNESIUM: CPT

## 2017-04-25 PROCEDURE — 96361 HYDRATE IV INFUSION ADD-ON: CPT

## 2017-04-25 PROCEDURE — 80048 BASIC METABOLIC PNL TOTAL CA: CPT

## 2017-04-25 PROCEDURE — 85025 COMPLETE CBC W/AUTO DIFF WBC: CPT

## 2017-04-25 PROCEDURE — 84100 ASSAY OF PHOSPHORUS: CPT

## 2017-04-25 PROCEDURE — 81025 URINE PREGNANCY TEST: CPT | Performed by: PHYSICIAN ASSISTANT

## 2017-04-25 RX ORDER — BUTALBITAL, ACETAMINOPHEN AND CAFFEINE 50; 325; 40 MG/1; MG/1; MG/1
1 TABLET ORAL EVERY 4 HOURS PRN
Status: DISCONTINUED | OUTPATIENT
Start: 2017-04-25 | End: 2017-04-26

## 2017-04-25 RX ORDER — OXYCODONE HYDROCHLORIDE 5 MG/1
10 TABLET ORAL EVERY 4 HOURS PRN
Status: DISCONTINUED | OUTPATIENT
Start: 2017-04-25 | End: 2017-04-26

## 2017-04-25 RX ORDER — GLUCAGON 1 MG
1 KIT INJECTION
Status: DISCONTINUED | OUTPATIENT
Start: 2017-04-25 | End: 2017-04-27 | Stop reason: HOSPADM

## 2017-04-25 RX ORDER — ACETAMINOPHEN 325 MG/1
650 TABLET ORAL EVERY 6 HOURS PRN
Status: DISCONTINUED | OUTPATIENT
Start: 2017-04-25 | End: 2017-04-26

## 2017-04-25 RX ORDER — ZOLPIDEM TARTRATE 5 MG/1
5 TABLET ORAL NIGHTLY PRN
Status: DISCONTINUED | OUTPATIENT
Start: 2017-04-25 | End: 2017-04-27 | Stop reason: HOSPADM

## 2017-04-25 RX ORDER — IBUPROFEN 200 MG
16 TABLET ORAL
Status: DISCONTINUED | OUTPATIENT
Start: 2017-04-25 | End: 2017-04-27 | Stop reason: HOSPADM

## 2017-04-25 RX ORDER — AMOXICILLIN 250 MG
1 CAPSULE ORAL 2 TIMES DAILY
Status: DISCONTINUED | OUTPATIENT
Start: 2017-04-25 | End: 2017-04-27 | Stop reason: HOSPADM

## 2017-04-25 RX ORDER — OXYCODONE HYDROCHLORIDE 5 MG/1
5 TABLET ORAL EVERY 4 HOURS PRN
Status: DISCONTINUED | OUTPATIENT
Start: 2017-04-25 | End: 2017-04-26

## 2017-04-25 RX ORDER — ALPRAZOLAM 0.25 MG/1
0.25 TABLET ORAL 2 TIMES DAILY PRN
Status: DISCONTINUED | OUTPATIENT
Start: 2017-04-25 | End: 2017-04-27 | Stop reason: HOSPADM

## 2017-04-25 RX ORDER — PROMETHAZINE HYDROCHLORIDE 12.5 MG/1
25 TABLET ORAL EVERY 6 HOURS PRN
Status: DISCONTINUED | OUTPATIENT
Start: 2017-04-25 | End: 2017-04-27 | Stop reason: HOSPADM

## 2017-04-25 RX ORDER — BUTALBITAL, ACETAMINOPHEN AND CAFFEINE 50; 325; 40 MG/1; MG/1; MG/1
1 TABLET ORAL
Status: COMPLETED | OUTPATIENT
Start: 2017-04-25 | End: 2017-04-25

## 2017-04-25 RX ORDER — OXYCODONE HYDROCHLORIDE 5 MG/1
10 TABLET ORAL
Status: COMPLETED | OUTPATIENT
Start: 2017-04-25 | End: 2017-04-25

## 2017-04-25 RX ORDER — ONDANSETRON 2 MG/ML
4 INJECTION INTRAMUSCULAR; INTRAVENOUS EVERY 8 HOURS PRN
Status: DISCONTINUED | OUTPATIENT
Start: 2017-04-25 | End: 2017-04-25

## 2017-04-25 RX ORDER — CYANOCOBALAMIN (VITAMIN B-12) 250 MCG
250 TABLET ORAL DAILY
Status: DISCONTINUED | OUTPATIENT
Start: 2017-04-26 | End: 2017-04-27 | Stop reason: HOSPADM

## 2017-04-25 RX ORDER — CYANOCOBALAMIN (VITAMIN B-12) 250 MCG
250 TABLET ORAL DAILY
Status: DISCONTINUED | OUTPATIENT
Start: 2017-04-26 | End: 2017-04-25

## 2017-04-25 RX ORDER — ONDANSETRON 2 MG/ML
4 INJECTION INTRAMUSCULAR; INTRAVENOUS EVERY 8 HOURS PRN
Status: CANCELLED | OUTPATIENT
Start: 2017-04-25

## 2017-04-25 RX ORDER — SODIUM CHLORIDE 9 MG/ML
INJECTION, SOLUTION INTRAVENOUS CONTINUOUS
Status: DISCONTINUED | OUTPATIENT
Start: 2017-04-25 | End: 2017-04-27 | Stop reason: HOSPADM

## 2017-04-25 RX ORDER — ALPRAZOLAM 0.5 MG/1
0.5 TABLET ORAL
Status: COMPLETED | OUTPATIENT
Start: 2017-04-25 | End: 2017-04-25

## 2017-04-25 RX ORDER — GADOBUTROL 604.72 MG/ML
7 INJECTION INTRAVENOUS
Status: COMPLETED | OUTPATIENT
Start: 2017-04-25 | End: 2017-04-25

## 2017-04-25 RX ORDER — GABAPENTIN 100 MG/1
100 CAPSULE ORAL 3 TIMES DAILY PRN
Status: DISCONTINUED | OUTPATIENT
Start: 2017-04-25 | End: 2017-04-27 | Stop reason: HOSPADM

## 2017-04-25 RX ORDER — SODIUM CHLORIDE 9 MG/ML
1000 INJECTION, SOLUTION INTRAVENOUS
Status: COMPLETED | OUTPATIENT
Start: 2017-04-25 | End: 2017-04-25

## 2017-04-25 RX ORDER — IBUPROFEN 200 MG
24 TABLET ORAL
Status: DISCONTINUED | OUTPATIENT
Start: 2017-04-25 | End: 2017-04-27 | Stop reason: HOSPADM

## 2017-04-25 RX ORDER — ONDANSETRON 8 MG/1
8 TABLET, ORALLY DISINTEGRATING ORAL EVERY 8 HOURS PRN
Status: DISCONTINUED | OUTPATIENT
Start: 2017-04-25 | End: 2017-04-27 | Stop reason: HOSPADM

## 2017-04-25 RX ORDER — ALPRAZOLAM 0.25 MG/1
0.25 TABLET ORAL ONCE
Status: COMPLETED | OUTPATIENT
Start: 2017-04-25 | End: 2017-04-25

## 2017-04-25 RX ADMIN — BUTALBITAL, ACETAMINOPHEN AND CAFFEINE 1 TABLET: 50; 325; 40 TABLET ORAL at 12:04

## 2017-04-25 RX ADMIN — CAFFEINE AND SODIUM BENZOATE 500 MG: 125 INJECTION, SOLUTION INTRAMUSCULAR; INTRAVENOUS at 11:04

## 2017-04-25 RX ADMIN — ALPRAZOLAM 0.25 MG: 0.25 TABLET ORAL at 12:04

## 2017-04-25 RX ADMIN — OXYCODONE HYDROCHLORIDE 10 MG: 5 TABLET ORAL at 05:04

## 2017-04-25 RX ADMIN — STANDARDIZED SENNA CONCENTRATE AND DOCUSATE SODIUM 1 TABLET: 8.6; 5 TABLET, FILM COATED ORAL at 09:04

## 2017-04-25 RX ADMIN — ALPRAZOLAM 0.5 MG: 0.5 TABLET ORAL at 02:04

## 2017-04-25 RX ADMIN — GADOBUTROL 7 ML: 604.72 INJECTION INTRAVENOUS at 03:04

## 2017-04-25 RX ADMIN — SODIUM CHLORIDE: 0.9 INJECTION, SOLUTION INTRAVENOUS at 08:04

## 2017-04-25 RX ADMIN — SODIUM CHLORIDE 1000 ML: 0.9 INJECTION, SOLUTION INTRAVENOUS at 05:04

## 2017-04-25 RX ADMIN — ACETAMINOPHEN 650 MG: 325 TABLET ORAL at 10:04

## 2017-04-25 RX ADMIN — ZOLPIDEM TARTRATE 5 MG: 5 TABLET, FILM COATED ORAL at 09:04

## 2017-04-25 RX ADMIN — SODIUM CHLORIDE 1000 ML: 0.9 INJECTION, SOLUTION INTRAVENOUS at 10:04

## 2017-04-25 NOTE — H&P
[facility] Hospital Medicine  History & Physical    Patient Name: Eunice Hickman  MRN: [id number]  Admission Date: 2017  Attending Physician: Fabien Wilkerson MD   Primary Care Provider: Abigail Roberts MD    [facility] Medicine Team: [facility] MED E Pedro Frederick PA-C     Patient information was obtained from patient, past medical records and ER records.     Subjective:     Principal Problem:Post-dural puncture headache    Chief Complaint:   Chief Complaint   Patient presents with    Headache     LP on friday, sent here for patch        HPI: 42F with no significant PMHx being admitted to observation for intractable headache s/p lumbar puncture on . The patient underwent an LP to r/o MS in setting of new onset numbness (MRI and LP unremarkable). The headache began very mildly on  but progressed to 10/10 by the next day. She describes it as a dull throbbing pain in the posterior aspect with radiation to her eyes. Her headache is worsened with postural changes (including sitting up and and sitting on toilet) and improves when supine. She has been taking naproxen, gabapentin, and tylenol at home with no relief. She endorses nausea but no vomiting, but attributes this to her new medications. She denies lightheadedness/dizziness, worsening numbness/weakness, B/B incontinence, fevers, vision changes, chest pain, shortness of breath, dysuria.    In the ED, patient received IV caffeine, fiorecet, and fluids while maintained in supine position. Neurology and anesthesia were consulted and examined the patient. PRN pain medications were ordered and aggressive conservative therapy was recommended. She voiced improvement in her symptoms with treatment in the ED.      Past Medical History:   Diagnosis Date    Anxiety 2017       Past Surgical History:   Procedure Laterality Date     SECTION      DILATION AND CURETTAGE OF UTERUS      HERNIA REPAIR         Review of patient's  allergies indicates:  No Known Allergies    No current facility-administered medications on file prior to encounter.      Current Outpatient Prescriptions on File Prior to Encounter   Medication Sig    alpha lipoic acid 600 mg Cap Take 1 tablet by mouth once daily.    CYANOCOBALAMIN, VITAMIN B-12, (VITAMIN B-12 ORAL) Take by mouth.    gabapentin (NEURONTIN) 100 MG capsule Take 1 capsule (100 mg total) by mouth 3 (three) times daily as needed (pain).    zolpidem (AMBIEN) 5 MG Tab TK 1 T PO QHS PRF INSOMNIA    naproxen sodium (ANAPROX) 550 MG tablet TK 1 T PO BID WF    [DISCONTINUED] prenatal vit #108-iron-FA 30 mg iron- 800 mcg Tab Take by mouth.     Family History     Problem Relation (Age of Onset)    No Known Problems Mother, Father, Sister, Brother, Maternal Aunt, Maternal Uncle, Paternal Aunt, Paternal Uncle, Maternal Grandmother, Maternal Grandfather, Paternal Grandmother, Paternal Grandfather        Social History Main Topics    Smoking status: Never Smoker    Smokeless tobacco: Never Used    Alcohol use Yes      Comment: occasionally    Drug use: No    Sexual activity: Yes     Partners: Male     Review of Systems   Constitutional: Negative for appetite change, chills, fatigue and fever.   HENT: Negative for congestion, sinus pressure and trouble swallowing.    Eyes: Negative for photophobia, pain and visual disturbance.   Respiratory: Negative for cough, chest tightness, shortness of breath and wheezing.    Cardiovascular: Negative for chest pain, palpitations and leg swelling.   Gastrointestinal: Positive for nausea. Negative for abdominal pain, constipation, diarrhea and vomiting.   Genitourinary: Negative for difficulty urinating, dysuria and hematuria.   Musculoskeletal: Negative for arthralgias, back pain and joint swelling.   Neurological: Positive for numbness (baseline numbness in distal extremities) and headaches. Negative for seizures, syncope, facial asymmetry, speech difficulty,  weakness and light-headedness.   Psychiatric/Behavioral: Negative for agitation and confusion. The patient is nervous/anxious.      Objective:     Vital Signs (Most Recent):  Temp: 97.9 °F (36.6 °C) (04/25/17 0947)  Pulse: 66 (04/25/17 1800)  Resp: 18 (04/25/17 1800)  BP: 104/66 (04/25/17 1800)  SpO2: 100 % (04/25/17 1800) Vital Signs (24h Range):  Temp:  [97.9 °F (36.6 °C)] 97.9 °F (36.6 °C)  Pulse:  [66-92] 66  Resp:  [16-20] 18  SpO2:  [100 %] 100 %  BP: (104-117)/(58-76) 104/66     Weight: 61.2 kg (135 lb)  Body mass index is 23.17 kg/(m^2).    Physical Exam   Constitutional: She is oriented to person, place, and time. She appears well-developed and well-nourished. No distress.   HENT:   Head: Normocephalic and atraumatic.   Nose: Nose normal.   Mouth/Throat: No oropharyngeal exudate.   Eyes: Conjunctivae and EOM are normal. Pupils are equal, round, and reactive to light. No scleral icterus.   Neck: Normal range of motion. Neck supple.   Cardiovascular: Normal rate, regular rhythm, normal heart sounds and intact distal pulses.    No murmur heard.  Pulmonary/Chest: Effort normal and breath sounds normal. No respiratory distress. She has no wheezes.   Abdominal: Soft. Bowel sounds are normal. She exhibits no distension. There is no tenderness.   Musculoskeletal: Normal range of motion. She exhibits no edema or tenderness.   Neurological: She is alert and oriented to person, place, and time. She has normal strength. She displays no tremor. No cranial nerve deficit or sensory deficit. She displays no seizure activity. Coordination normal. GCS eye subscore is 4. GCS verbal subscore is 5. GCS motor subscore is 6.   Strength 5/5 B L/U E, no focal deficits, patient eating crackers in bed.   Skin: Skin is warm and dry.   Psychiatric: She has a normal mood and affect. Her speech is normal and behavior is normal. Judgment and thought content normal. Cognition and memory are normal.   Nursing note and vitals reviewed.        Significant Labs:   Recent Lab Results       04/25/17  1152 04/25/17  1150      POC BUN  10     POC Chloride  102     POC Creatinine  0.7     POC Glucose  95     POC Hematocrit  39     POC Ionized Calcium  1.14     POC Potassium  3.9     POC Sodium  140     POC TCO2 (MEASURED)  24     Preg Test, Ur Negative       Acceptable Yes      Sample  MITCH           Significant Imaging: I have reviewed all pertinent imaging results/findings within the past 24 hours.    Assessment/Plan:     * Post-dural puncture headache  S/P LP 04/21/2017 for evaluation of new onset numbness to r/o MS (MRI/LP unremarkable). Did well after procedure, discharged 04/22, awoke 04/23 with pounding HA, worse with postural changes, improved when supine. No focal neurologic deficits on exam, numbness remains unchanged. Received caffeine and fluids in ED with improvement in symptoms    - Anesthesia following for consideration of blood patch if conservative treatment unsuccessful  - Neurology following, reccs appreciated from both teams  - Continue MIVFs, continue fioricet, PRN oxy 5/10, bed rest, neuro checks  - MRI L spine without acute findings    Numbness  No significant changes in numbness since prior admission    - continue gabapentin PRN, continue vitamins, ALA not on formulary    Anxiety  - continue alprazolam PRN    Cyst of left ovary  Incidentally found on MRI of L spine.    - Probable 5.7 cm left ovarian cyst.  Further evaluation with a pelvic ultrasound exam is recommended in 6 weeks to ensure resolution.    VTE Risk Mitigation         Ordered     Place ROBLES hose  Until discontinued      04/25/17 1813     Place sequential compression device  Until discontinued      04/25/17 1813     Medium Risk of VTE  Once      04/25/17 1813        Pedro Frederick PA-C  Department of Hospital Medicine   [facility]

## 2017-04-25 NOTE — ASSESSMENT & PLAN NOTE
No significant changes in numbness since prior admission    - continue gabapentin PRN, continue vitamins, ALA not on formulary

## 2017-04-25 NOTE — ASSESSMENT & PLAN NOTE
S/P LP 04/21/2017 for evaluation of new onset numbness to r/o MS (MRI/LP unremarkable). Did well after procedure, discharged 04/22, awoke 04/23 with pounding HA, worse with postural changes, improved when supine. No focal neurologic deficits on exam, numbness remains unchanged. Received caffeine and fluids in ED with improvement in symptoms    - Anesthesia following for consideration of blood patch if conservative treatment unsuccessful  - Neurology following, reccs appreciated from both teams  - Continue MIVFs, continue fioricet, PRN oxy 5/10, bed rest, neuro checks  - MRI L spine without acute findings

## 2017-04-25 NOTE — CONSULTS
Evaluated patient at bedside with concerns for PDPH s/p lumbar puncture (4/21/17)    Eunice is a 41yo female with no significant PMH who has developed numbness and tingling in arms and feet over the past month who was evaluated by neurology for MS with LP.     Patient with previous c/s x3 she believes with spinals (unsure if she got epidural) without any significant complication.     After procedure patient states was doing well Friday afternoon, discharged Saturday. Sunday morning woke up with pounding headache. Describes as dull throbbing 10/10 pain in posterior aspect with radiation to eyes. Headache is associated with worsening pain with postural changes (including sitting up and sitting on toilet) and improves when lying down and rest.    Has been taking naproxen gabapentin and tylenol at home.     Has some nausea but no vomiting. Describes decreased PO intake over the past several days. But no lightheadedness, or dizziness.     Denies any recent fevers, chills, change in vision, no chest pain, shortness of breath or difficulty breathing. Denies any changes in bladder or bowel habits, no increased weakness or change in sensation in BLE.     PE:  AAOx4, in no acute distress resting comfortably in bed eating crackers  Sinus tach, WNL RR.   5/5 strength in BLE, sensation is intact to light touch BLE.    A/P:  Eunice is a 41yo female with no significant PMH s/p LP (4/20/17) who presented with symptoms consistent with PDPH. She is at increased risk for PDPH as she has virtually all of the risk factors being young female with normal BMI in which a large bore beveled (Quincke) needle was used for her procedure.    Long discussion with patient and her mother regarding management. Risks and benefits were discussed considering conservative vs interventional management. Including continue IVFs, starting opioids, fiorcet and any other potential medications for symptoms of nausea/vomiting.     At this time given the  significant events and changes she has experienced over the past month she would like to continue conservative management for the next 24 hours and reevaluate for epidural blood patch at that time.    Cont MIVFs at 100ml/hr    Consider scheduling fiorcet q4 hours for persistent headache, maximum 6 tablets/day.     PRN zofran q8 for nausea,     PRN oxycodone for pain q3 hours (5mg 4-5/10 pain, 10mg 6-7/10 pain, and 15mg for 8-10/10 pain)    Generally speaking up to 75% of PDPH resolves with aggressive conservative therapy after 5-7 days, shes currently day 2 since onset of symptoms.     If no improvement in symptoms and patient is willing to proceed with EBP would need CBC to evaluate platelet count prior to procedure.     Juan Torres Jr., M.D.  Pager: [phone]

## 2017-04-25 NOTE — IP AVS SNAPSHOT
[facility]  [address]  [facility] [address]  Phone: [phone]           Patient Discharge Instructions   Our goal is to set you up for success. This packet includes information on your condition, medications, and your home care.  It will help you care for yourself to prevent having to return to the hospital.     Please ask your nurse if you have any questions.      There are many details to remember when preparing to leave the hospital. Here is what you will need to do:    1. Take your medicine. If you are prescribed medications, review your Medication List on the following pages. You may have new medications to  at the pharmacy and others that you'll need to stop taking. Review the instructions for how and when to take your medications. Talk with your doctor or nurses if you are unsure of what to do.     2. Go to your follow-up appointments. Specific follow-up information is listed in the following pages. Your may be contacted by a nurse or clinical provider about future appointments. Be sure we have all of the phone numbers to reach you. Please contact your provider's office if you are unable to make an appointment.     3. Watch for warning signs. Your doctor or nurse will give you detailed warning signs to watch for and when to call for assistance. These instructions may also include educational information about your condition. If you experience any of warning signs to your health, call your doctor.           [facility]  Unless otherwise directed by your provider, please   contact [facility], our nurse care line   that is available for 24/7 assistance.     [phone] (toll-free)     Registered nurses in the [facility]   provide: appointment scheduling, clinical advisement, health education, and other advisory services.                  ** Verify the list of medication(s) below is accurate and up to date. Carry this with you in case of  emergency. If your medications have changed, please notify your healthcare provider.             Medication List      START taking these medications        Additional Info                      butalbital-acetaminophen-caffeine -40 mg -40 mg per tablet   Commonly known as:  FIORICET, ESGIC   Quantity:  30 tablet   Refills:  0   Dose:  1 tablet    Last time this was given:  1 tablet on 4/27/2017 10:05 AM   Instructions:  Take 1 tablet by mouth every 4 (four) hours. X 1 day, then every 5-6 hours x 2 days, then as needed     Begin Date    AM    Noon    PM    Bedtime       ondansetron 4 MG Tbdl   Commonly known as:  ZOFRAN-ODT   Quantity:  30 tablet   Refills:  0   Dose:  4 mg    Last time this was given:  8 mg on 4/27/2017  1:33 AM   Instructions:  Take 1 tablet (4 mg total) by mouth every 6 (six) hours as needed.     Begin Date    AM    Noon    PM    Bedtime       oxycodone 5 MG immediate release tablet   Commonly known as:  ROXICODONE   Quantity:  30 tablet   Refills:  0   Dose:  5 mg    Last time this was given:  5 mg on 4/27/2017  9:15 AM   Instructions:  Take 1 tablet (5 mg total) by mouth every 3 (three) hours as needed (Take 1 tablet (5mg) for 4-5/10 pain, 2 tablets (10mg) for 6-7/10 pain, or 3 tablets (15mg) for 8-10/10 pain every 3 hours as needed).     Begin Date    AM    Noon    PM    Bedtime         CONTINUE taking these medications        Additional Info                      alpha lipoic acid 600 mg Cap   Quantity:  30 each   Refills:  5   Dose:  1 tablet    Instructions:  Take 1 tablet by mouth once daily.     Begin Date    AM    Noon    PM    Bedtime       gabapentin 100 MG capsule   Commonly known as:  NEURONTIN   Quantity:  90 capsule   Refills:  5   Dose:  100 mg    Instructions:  Take 1 capsule (100 mg total) by mouth 3 (three) times daily as needed (pain).     Begin Date    AM    Noon    PM    Bedtime       naproxen sodium 550 MG tablet   Commonly known as:  ANAPROX   Refills:  0     Instructions:  TK 1 T PO BID WF     Begin Date    AM    Noon    PM    Bedtime       VITAMIN B-12 ORAL   Refills:  0    Last time this was given:  250 mcg on 4/27/2017  9:15 AM   Instructions:  Take by mouth.     Begin Date    AM    Noon    PM    Bedtime       zolpidem 5 MG Tab   Commonly known as:  AMBIEN   Refills:  0    Last time this was given:  5 mg on 4/26/2017  9:43 PM   Instructions:  TK 1 T PO QHS PRF INSOMNIA     Begin Date    AM    Noon    PM    Bedtime            Where to Get Your Medications      You can get these medications from any pharmacy     Bring a paper prescription for each of these medications     butalbital-acetaminophen-caffeine -40 mg -40 mg per tablet    ondansetron 4 MG Tbdl    oxycodone 5 MG immediate release tablet                  Please bring to all follow up appointments:    1. A copy of your discharge instructions.  2. All medicines you are currently taking in their original bottles.  3. Identification and insurance card.    Please arrive 15 minutes ahead of scheduled appointment time.    Please call 24 hours in advance if you must reschedule your appointment and/or time.        Your Scheduled Appointments     May 01, 2017  1:00 PM CDT   Consult with Maria De Jesus Ames MD   [facility] - Rheumatology ([facility] )    [address]  [place] [address] [phone]              Follow-up Information     Follow up with Abigail Roberts MD.    Specialty:  Family Medicine    Contact information:    [address]  [address]  [facility]  [facility] [id number] [phone]          Follow up with Raghav Gutierrez III, MD.    Specialty:  Neurology    Why:  Follow up as scheduled    Contact information:    [address]  [address]  [facility] [id number] [phone]          Discharge Instructions     Future Orders    Activity as tolerated     Call MD for:  increased confusion or weakness     Call MD for:  persistent dizziness,  "light-headedness, or visual disturbances     Call MD for:  persistent nausea and vomiting or diarrhea     Call MD for:  severe uncontrolled pain     Call MD for:  temperature >100.4     Diet general     Questions:    Total calories:      Fat restriction, if any:      Protein restriction, if any:      Na restriction, if any:      Fluid restriction:      Additional restrictions:          Primary Diagnosis     Your primary diagnosis was:  Post-Dural Puncture Headache      Admission Information     Date & Time Provider Department CSN    4/25/2017  9:53 AM Fabien Wilkerson MD [facility] [id number]      Care Providers     Provider Role Specialty Primary office phone    Fabien Wilkerson MD Attending Provider Hospitalist [phone]    Marcy Billy MD Consulting Physician  Neurology [phone]    Raghav Guy MD Team Attending  Hospitalist [phone]    Fabien Wilkerson MD Team Attending  Hospitalist [phone]      Your Vitals Were     BP Pulse Temp Resp Height Weight    98/60 (BP Location: Right arm, Patient Position: Lying, BP Method: Automatic) 70 97.6 °F (36.4 °C) (Oral) 20 5' 4" (1.626 m) 61.2 kg (135 lb)    Last Period SpO2 BMI          04/06/2017 (Approximate) 98% 23.17 kg/m2        Recent Lab Values        4/21/2017                          10:42 AM           A1C 5.3           Comment for A1C at 10:42 AM on 4/21/2017:  According to ADA guidelines, hemoglobin A1C <7.0% represents  optimal control in non-pregnant diabetic patients.  Different  metrics may apply to specific populations.   Standards of Medical Care in Diabetes - 2016.  For the purpose of screening for the presence of diabetes:  <5.7%     Consistent with the absence of diabetes  5.7-6.4%  Consistent with increasing risk for diabetes   (prediabetes)  >or=6.5%  Consistent with diabetes  Currently no consensus exists for use of hemoglobin A1C  for diagnosis of diabetes for children.        Pending Labs     Order Current Status    " Paraneoplastic Autoantibody Evaulation, Serum In process      Allergies as of 4/27/2017     No Known Allergies      Advance Directives     An advance directive is a document which, in the event you are no longer able to make decisions for yourself, tells your healthcare team what kind of treatment you do or do not want to receive, or who you would like to make those decisions for you.  If you do not currently have an advance directive, [facility] encourages you to create one.  For more information call:  [phone]), [phone] ([phone]),  or log on to [email or web address].        Language Assistance Services     ATTENTION: Language assistance services are available, free of charge. Please call [phone].      ATENCIÓN: Si habla darron, tiene a hernandez disposición servicios gratuitos de asistencia lingüística. Llame al [phone].     CHÚ Ý: N?u b?n nói Ti?ng Vi?t, có các d?ch v? h? tr? ngôn ng? mi?n phí dành cho b?n. G?i s? [phone].         [facility] complies with applicable Federal civil rights laws and does not discriminate on the basis of race, color, national origin, age, disability, or sex.

## 2017-04-25 NOTE — ED NOTES
Pt stating last time she was in the hospital, she was given 1 mg xanax, instead of her home dose of 0.25mg; stating she feels like she is having a panic attack. MAMADOU Sommers, aware.

## 2017-04-25 NOTE — ED PROVIDER NOTES
"Encounter Date: 2017    SCRIBE #1 NOTE: I, Goyo Metz, am scribing for, and in the presence of,  Demetrice Amaral MD. I have scribed the following portions of the note - the APC attestation.       History     Chief Complaint   Patient presents with    Headache     LP on friday, sent here for patch     Review of patient's allergies indicates:  No Known Allergies  HPI Comments: Patient is a 42-year-old female with a no significant past medical history who presents the ED with referral from neurology for headache status post LP.  Patient was admitted on 17 for numbness ongoing for approximately one month.  She was seen by neurology, undergoing eval for MS, and had a lumbar puncture on 17.  Patient states that she started to develop a headache on Saturday.  She states on , her headache became worse and describes it as "pounding".  Her headache is worse with sitting up, standing, and movement.  Laying down relieves her pain.  Her headache is located mainly to her posterior head.  She has been taking Tylenol, naproxen, and gabapentin with no relief.  Last dose of naproxen and gabapentin 0800 this morning.  She endorses nausea, but has not vomited.  She denies any fever, chills, neck pain, neck stiffness, blurred vision, difficulty ambulating.  Currently, her paresthesias are located to her fingertips and toes which are at baseline today.    The history is provided by the patient.     Past Medical History:   Diagnosis Date    Anxiety 2017     Past Surgical History:   Procedure Laterality Date     SECTION      DILATION AND CURETTAGE OF UTERUS      HERNIA REPAIR       Family History   Problem Relation Age of Onset    No Known Problems Mother     No Known Problems Father     No Known Problems Sister     No Known Problems Brother     No Known Problems Maternal Aunt     No Known Problems Maternal Uncle     No Known Problems Paternal Aunt     No Known Problems Paternal Uncle     No " Known Problems Maternal Grandmother     No Known Problems Maternal Grandfather     No Known Problems Paternal Grandmother     No Known Problems Paternal Grandfather     Amblyopia Neg Hx     Blindness Neg Hx     Cancer Neg Hx     Cataracts Neg Hx     Diabetes Neg Hx     Glaucoma Neg Hx     Hypertension Neg Hx     Macular degeneration Neg Hx     Retinal detachment Neg Hx     Strabismus Neg Hx     Stroke Neg Hx     Thyroid disease Neg Hx      Social History   Substance Use Topics    Smoking status: Never Smoker    Smokeless tobacco: Never Used    Alcohol use Yes      Comment: occasionally     Review of Systems   Constitutional: Negative for chills and fever.   HENT: Negative for ear pain and sore throat.    Eyes: Negative for photophobia and visual disturbance.   Respiratory: Negative for cough.    Cardiovascular: Negative for chest pain.   Gastrointestinal: Negative for abdominal pain and nausea.   Endocrine: Negative for polyuria.   Genitourinary: Negative for dysuria and urgency.   Musculoskeletal: Negative for back pain, gait problem and neck pain.   Skin: Negative for pallor.   Neurological: Positive for numbness and headaches. Negative for seizures and weakness.       Physical Exam   Initial Vitals   BP Pulse Resp Temp SpO2   04/25/17 0947 04/25/17 0947 04/25/17 0947 04/25/17 0947 04/25/17 0947   117/72 92 18 97.9 °F (36.6 °C) 100 %     Physical Exam    Vitals reviewed.  Constitutional: She appears well-developed and well-nourished. She is not diaphoretic. No distress.   HENT:   Head: Normocephalic and atraumatic.   Nose: Nose normal.   Eyes: Conjunctivae and EOM are normal. Pupils are equal, round, and reactive to light.   Neck: Normal range of motion. Neck supple.   Cardiovascular: Normal rate, regular rhythm and normal heart sounds. Exam reveals no friction rub.    No murmur heard.  Pulmonary/Chest: Breath sounds normal. No respiratory distress. She has no wheezes. She has no rales.    Abdominal: Soft. Bowel sounds are normal. She exhibits no distension. There is no tenderness. There is no rebound.   Musculoskeletal: Normal range of motion. She exhibits no tenderness.   Neurological: She is alert and oriented to person, place, and time. She has normal strength. She displays no atrophy. No cranial nerve deficit or sensory deficit. She exhibits normal muscle tone.   Reflex Scores:       Bicep reflexes are 2+ on the right side and 2+ on the left side.       Brachioradialis reflexes are 2+ on the right side and 2+ on the left side.  Sensations intact despite reports of numbness in fingers and toes   Skin: Skin is warm and dry. No erythema. No pallor.   LP site without signs of infection   Psychiatric: She has a normal mood and affect. Her behavior is normal. Judgment and thought content normal.         ED Course   Procedures  Labs Reviewed   BASIC METABOLIC PANEL - Abnormal; Notable for the following:        Result Value    Potassium 3.4 (*)     Chloride 111 (*)     Calcium 8.3 (*)     Anion Gap 7 (*)     All other components within normal limits   CBC W/ AUTO DIFFERENTIAL - Abnormal; Notable for the following:     RBC 3.97 (*)     Hematocrit 35.3 (*)     MPV 9.1 (*)     All other components within normal limits   MAGNESIUM   PHOSPHORUS   PARANEOPLASTIC AUTOANTIBODY EVAULATION, SERUM   PARANEOPLASTIC AUTOANTIBODY EVAULATION, SERUM   POCT URINE PREGNANCY   ISTAT PROCEDURE             Medical Decision Making:   History:   Old Medical Records: I decided to obtain old medical records.  Clinical Tests:   Lab Tests: Ordered and Reviewed  Radiological Study: Ordered and Reviewed  Other:   I have discussed this case with another health care provider.    Imaging Results         MRI Lumbar Spine W WO Cont (Final result)    Abnormal Result time:  04/25/17 16:24:22    Final result by Leslie George MD (04/25/17 16:24:22)    Impression:        1.  No abnormality of the lumbar spine is detected.    2.  Probable  5.7 cm left ovarian cyst.  Further evaluation with a pelvic ultrasound exam is recommended in 6 weeks to ensure resolution.    Report has been flagged in the EPIC medical record system.        ______________________________________     Electronically signed by resident: JOHN PACHECO MD  Date:     04/25/17  Time:    16:11            As the supervising and teaching physician, I personally reviewed the images and resident's interpretation and I agree with the findings.            Electronically signed by: Jay Edwards MD  Date:     04/25/17  Time:    16:24     Narrative:    Technique: Sagittal T1, sagittal T2, sagittal STIR, axial T1, and axial T2 weighted images of the lumbar spine were obtained without contrast.  Postcontrast T1 sagittal and T1 axial images were obtained following the administration of 7 cc Gadavist intravenous contrast material.    Comparison: None    Findings:    Lumbar spine alignment is within normal limits.  The vertebral body heights are well maintained, with no fracture.  There is no marrow signal abnormality suspicious for infiltrative process.  The conus is normal in appearance and terminates at the L1-2 level.      The adjacent soft tissue structures show a 5.7 x 4.7 x 5.5 cm complex cystic structure superior to the uterus possibly a left ovarian lesion.      After the administration of intravenous contrast, there were no areas of abnormal enhancement seen within the osseous structures, soft tissues, spinal cord or nerve roots.    There is no focal disk herniation.  No significant spinal canal stenosis or neural foraminal narrowing.                 APC / Resident Notes:   Patient presents with headache status post LP.  On exam, vital signs stable.  Neck with full range of motion and supple.  Range of motion of extremities and strength intact.  DTRs intact.  No neurological deficits appreciated.  Skin without abnormalities.  DDX includes but is not limited to headache status post LP,  tension headache, migraine headache.  I have considered but do not suspect meningitis at this time.  Will give IV fluids and caffeine and reassess.    Will get MRI of lumbar with and without contrast per neurology recs. Consult appreciated.  Anesthesiology recommends conservative treatment for now.  See consult note for recs in detail.  MRI results show no abnormality of the lumbar spine.    Patient updated with results.  She reports slight improvement in symptoms to 8/10.  She will be placed in observation for further evaluation and management for headache status post LP.       Scribe Attestation:   Scribe #1: I performed the above scribed service and the documentation accurately describes the services I performed. I attest to the accuracy of the note.    Attending Attestation:     Physician Attestation Statement for NP/PA:   I discussed this assessment and plan of this patient with the NP/PA, but I did not personally examine the patient. The face to face encounter was performed by the NP/PA.    Other NP/PA Attestation Additions:      Medical Decision Makin year old female under evaluation for MS presenting with post LP HA. Pt sent from neurology clinic for blood patch. Case discussed with anesthesia who is recommending conservative Tx with IV fluids and Caffeine. Will order and closely evaluate.     Neurology at bedside, they're recommending an MRI of the lumbar spine.  Orders placed.  Patient reports slight improvement after IV caffeine.  Will place in observation for continued pain control       Physician Attestation for Scribe:  Physician Attestation Statement for Scribe #1: I, Demetrice Amaral MD, reviewed documentation, as scribed by Goyo Metz in my presence, and it is both accurate and complete.                 ED Course     Clinical Impression:   The primary encounter diagnosis was Cyst of left ovary. Diagnoses of Headache, History of lumbar puncture, Numbness, and Post-dural puncture headache were  also pertinent to this visit.    Disposition:   Disposition: Placed in Observation  Condition: Stable       Adelina Houston PA-C  04/25/17 [id number]       Demetrice Amaral MD  04/26/17 1016

## 2017-04-25 NOTE — ED NOTES
LOC: The patient is awake, alert and aware of environment with an appropriate affect, the patient is oriented x 3 and speaking appropriately.  APPEARANCE: Patient resting comfortably and in no acute distress, patient is clean and well groomed, patient's clothing is properly fastened.  SKIN: The skin is warm and dry, color consistent with ethnicity, patient has normal skin turgor and moist mucus membranes, skin intact, no breakdown or bruising noted.  MUSCULOSKELETAL: Patient moving all extremities spontaneously, no obvious swelling or deformities noted.  NEURO:  Intact, Pain to occipital region of head, non-radiating rated 9/10.  RESPIRATORY: Airway is open and patent, respirations are spontaneous, patient has a normal effort and rate, no accessory muscle use noted.  ABDOMEN: Soft and non tender to palpation, no distention noted, normoactive bowel sounds present in all four quadrants.

## 2017-04-25 NOTE — ED TRIAGE NOTES
Pt with occipital headache that started this morning.  Pt states that she had a LP on Saturday for neuropathy to r/o MS.  She states that she called her MD who told her to come to ED for a patch.

## 2017-04-25 NOTE — SUBJECTIVE & OBJECTIVE
Past Medical History:   Diagnosis Date    Anxiety 2017       Past Surgical History:   Procedure Laterality Date     SECTION      DILATION AND CURETTAGE OF UTERUS      HERNIA REPAIR         Review of patient's allergies indicates:  No Known Allergies    No current facility-administered medications on file prior to encounter.      Current Outpatient Prescriptions on File Prior to Encounter   Medication Sig    alpha lipoic acid 600 mg Cap Take 1 tablet by mouth once daily.    CYANOCOBALAMIN, VITAMIN B-12, (VITAMIN B-12 ORAL) Take by mouth.    gabapentin (NEURONTIN) 100 MG capsule Take 1 capsule (100 mg total) by mouth 3 (three) times daily as needed (pain).    zolpidem (AMBIEN) 5 MG Tab TK 1 T PO QHS PRF INSOMNIA    naproxen sodium (ANAPROX) 550 MG tablet TK 1 T PO BID WF    [DISCONTINUED] prenatal vit #108-iron-FA 30 mg iron- 800 mcg Tab Take by mouth.     Family History     Problem Relation (Age of Onset)    No Known Problems Mother, Father, Sister, Brother, Maternal Aunt, Maternal Uncle, Paternal Aunt, Paternal Uncle, Maternal Grandmother, Maternal Grandfather, Paternal Grandmother, Paternal Grandfather        Social History Main Topics    Smoking status: Never Smoker    Smokeless tobacco: Never Used    Alcohol use Yes      Comment: occasionally    Drug use: No    Sexual activity: Yes     Partners: Male     Review of Systems   Constitutional: Negative for appetite change, chills, fatigue and fever.   HENT: Negative for congestion, sinus pressure and trouble swallowing.    Eyes: Negative for photophobia, pain and visual disturbance.   Respiratory: Negative for cough, chest tightness, shortness of breath and wheezing.    Cardiovascular: Negative for chest pain, palpitations and leg swelling.   Gastrointestinal: Positive for nausea. Negative for abdominal pain, constipation, diarrhea and vomiting.   Genitourinary: Negative for difficulty urinating, dysuria and hematuria.   Musculoskeletal:  Negative for arthralgias, back pain and joint swelling.   Neurological: Positive for numbness (baseline numbness in distal extremities) and headaches. Negative for seizures, syncope, facial asymmetry, speech difficulty, weakness and light-headedness.   Psychiatric/Behavioral: Negative for agitation and confusion. The patient is nervous/anxious.      Objective:     Vital Signs (Most Recent):  Temp: 97.9 °F (36.6 °C) (04/25/17 0947)  Pulse: 66 (04/25/17 1800)  Resp: 18 (04/25/17 1800)  BP: 104/66 (04/25/17 1800)  SpO2: 100 % (04/25/17 1800) Vital Signs (24h Range):  Temp:  [97.9 °F (36.6 °C)] 97.9 °F (36.6 °C)  Pulse:  [66-92] 66  Resp:  [16-20] 18  SpO2:  [100 %] 100 %  BP: (104-117)/(58-76) 104/66     Weight: 61.2 kg (135 lb)  Body mass index is 23.17 kg/(m^2).    Physical Exam   Constitutional: She is oriented to person, place, and time. She appears well-developed and well-nourished. No distress.   HENT:   Head: Normocephalic and atraumatic.   Nose: Nose normal.   Mouth/Throat: No oropharyngeal exudate.   Eyes: Conjunctivae and EOM are normal. Pupils are equal, round, and reactive to light. No scleral icterus.   Neck: Normal range of motion. Neck supple.   Cardiovascular: Normal rate, regular rhythm, normal heart sounds and intact distal pulses.    No murmur heard.  Pulmonary/Chest: Effort normal and breath sounds normal. No respiratory distress. She has no wheezes.   Abdominal: Soft. Bowel sounds are normal. She exhibits no distension. There is no tenderness.   Musculoskeletal: Normal range of motion. She exhibits no edema or tenderness.   Neurological: She is alert and oriented to person, place, and time. She has normal strength. She displays no tremor. No cranial nerve deficit or sensory deficit. She displays no seizure activity. Coordination normal. GCS eye subscore is 4. GCS verbal subscore is 5. GCS motor subscore is 6.   Strength 5/5 B L/U E, no focal deficits, patient eating crackers in bed.   Skin: Skin is  warm and dry.   Psychiatric: She has a normal mood and affect. Her speech is normal and behavior is normal. Judgment and thought content normal. Cognition and memory are normal.   Nursing note and vitals reviewed.       Significant Labs:   Recent Lab Results       04/25/17  1152 04/25/17  1150      POC BUN  10     POC Chloride  102     POC Creatinine  0.7     POC Glucose  95     POC Hematocrit  39     POC Ionized Calcium  1.14     POC Potassium  3.9     POC Sodium  140     POC TCO2 (MEASURED)  24     Preg Test, Ur Negative       Acceptable Yes      Sample  MITCH           Significant Imaging: I have reviewed all pertinent imaging results/findings within the past 24 hours.

## 2017-04-25 NOTE — ASSESSMENT & PLAN NOTE
Incidentally found on MRI of L spine.    - Probable 5.7 cm left ovarian cyst.  Further evaluation with a pelvic ultrasound exam is recommended in 6 weeks to ensure resolution.

## 2017-04-25 NOTE — CONSULTS
Pt sent to ED from home for blood patch after complaining of headache after LP on 4/21. Spoke with IR and they recommend blind blood patch with anesthesia team today.    Recommendations:  -IVFs and caffeine while awaiting blood patch  Formal consult note to follow     Laly Garcia PA-C  General Neurology Consult  Neuro Consult [facility] # [id number]

## 2017-04-26 LAB
ANION GAP SERPL CALC-SCNC: 9 MMOL/L
BASOPHILS # BLD AUTO: 0.02 K/UL
BASOPHILS NFR BLD: 0.3 %
BUN SERPL-MCNC: 7 MG/DL
CALCIUM SERPL-MCNC: 8.7 MG/DL
CHLORIDE SERPL-SCNC: 107 MMOL/L
CMV SPEC QL SHELL VIAL CULT: NO GROWTH
CO2 SERPL-SCNC: 23 MMOL/L
CREAT SERPL-MCNC: 0.7 MG/DL
DIFFERENTIAL METHOD: ABNORMAL
EOSINOPHIL # BLD AUTO: 0.8 K/UL
EOSINOPHIL NFR BLD: 10.6 %
ERYTHROCYTE [DISTWIDTH] IN BLOOD BY AUTOMATED COUNT: 12.5 %
EST. GFR  (AFRICAN AMERICAN): >60 ML/MIN/1.73 M^2
EST. GFR  (NON AFRICAN AMERICAN): >60 ML/MIN/1.73 M^2
GLUCOSE SERPL-MCNC: 113 MG/DL
GRAM STN SPEC: NORMAL
HCT VFR BLD AUTO: 35.3 %
HGB BLD-MCNC: 11.7 G/DL
LYMPHOCYTES # BLD AUTO: 2.1 K/UL
LYMPHOCYTES NFR BLD: 28.2 %
MCH RBC QN AUTO: 29.8 PG
MCHC RBC AUTO-ENTMCNC: 33.1 %
MCV RBC AUTO: 90 FL
MONOCYTES # BLD AUTO: 0.5 K/UL
MONOCYTES NFR BLD: 6.3 %
NEUTROPHILS # BLD AUTO: 4.1 K/UL
NEUTROPHILS NFR BLD: 54.5 %
PLATELET # BLD AUTO: 186 K/UL
PMV BLD AUTO: 9.2 FL
POTASSIUM SERPL-SCNC: 4 MMOL/L
RBC # BLD AUTO: 3.93 M/UL
SODIUM SERPL-SCNC: 139 MMOL/L
WBC # BLD AUTO: 7.58 K/UL

## 2017-04-26 PROCEDURE — 99226 PR SUBSEQUENT OBSERVATION CARE,LEVEL III: CPT | Mod: ,,, | Performed by: PHYSICIAN ASSISTANT

## 2017-04-26 PROCEDURE — 25000003 PHARM REV CODE 250: Performed by: PHYSICIAN ASSISTANT

## 2017-04-26 PROCEDURE — 85025 COMPLETE CBC W/AUTO DIFF WBC: CPT

## 2017-04-26 PROCEDURE — G0378 HOSPITAL OBSERVATION PER HR: HCPCS

## 2017-04-26 PROCEDURE — 80048 BASIC METABOLIC PNL TOTAL CA: CPT

## 2017-04-26 PROCEDURE — 99226 PR SUBSEQUENT OBSERVATION CARE,LEVEL III: CPT | Mod: ,,, | Performed by: PSYCHIATRY & NEUROLOGY

## 2017-04-26 PROCEDURE — 36415 COLL VENOUS BLD VENIPUNCTURE: CPT

## 2017-04-26 PROCEDURE — 63600175 PHARM REV CODE 636 W HCPCS: Performed by: PHYSICIAN ASSISTANT

## 2017-04-26 RX ORDER — OXYCODONE HYDROCHLORIDE 5 MG/1
10 TABLET ORAL
Status: DISCONTINUED | OUTPATIENT
Start: 2017-04-26 | End: 2017-04-27 | Stop reason: HOSPADM

## 2017-04-26 RX ORDER — OXYCODONE HYDROCHLORIDE 5 MG/1
15 TABLET ORAL
Status: DISCONTINUED | OUTPATIENT
Start: 2017-04-26 | End: 2017-04-27 | Stop reason: HOSPADM

## 2017-04-26 RX ORDER — MAGNESIUM SULFATE HEPTAHYDRATE 40 MG/ML
2 INJECTION, SOLUTION INTRAVENOUS ONCE
Status: COMPLETED | OUTPATIENT
Start: 2017-04-26 | End: 2017-04-26

## 2017-04-26 RX ORDER — BUTALBITAL, ACETAMINOPHEN AND CAFFEINE 50; 325; 40 MG/1; MG/1; MG/1
1 TABLET ORAL EVERY 4 HOURS
Status: DISCONTINUED | OUTPATIENT
Start: 2017-04-26 | End: 2017-04-27 | Stop reason: HOSPADM

## 2017-04-26 RX ORDER — OXYCODONE HYDROCHLORIDE 5 MG/1
5 TABLET ORAL
Status: DISCONTINUED | OUTPATIENT
Start: 2017-04-26 | End: 2017-04-27 | Stop reason: HOSPADM

## 2017-04-26 RX ORDER — HYDROCODONE BITARTRATE AND ACETAMINOPHEN 5; 325 MG/1; MG/1
1 TABLET ORAL EVERY 6 HOURS PRN
Status: DISCONTINUED | OUTPATIENT
Start: 2017-04-26 | End: 2017-04-26

## 2017-04-26 RX ORDER — NAPROXEN 375 MG/1
375 TABLET ORAL ONCE
Status: COMPLETED | OUTPATIENT
Start: 2017-04-26 | End: 2017-04-26

## 2017-04-26 RX ORDER — HYDROCODONE BITARTRATE AND ACETAMINOPHEN 10; 325 MG/1; MG/1
1 TABLET ORAL EVERY 6 HOURS PRN
Status: DISCONTINUED | OUTPATIENT
Start: 2017-04-26 | End: 2017-04-26

## 2017-04-26 RX ADMIN — BUTALBITAL, ACETAMINOPHEN AND CAFFEINE 1 TABLET: 50; 325; 40 TABLET ORAL at 09:04

## 2017-04-26 RX ADMIN — CAFFEINE AND SODIUM BENZOATE 500 MG: 125 INJECTION, SOLUTION INTRAMUSCULAR; INTRAVENOUS at 03:04

## 2017-04-26 RX ADMIN — SODIUM CHLORIDE: 0.9 INJECTION, SOLUTION INTRAVENOUS at 09:04

## 2017-04-26 RX ADMIN — BUTALBITAL, ACETAMINOPHEN AND CAFFEINE 1 TABLET: 50; 325; 40 TABLET ORAL at 05:04

## 2017-04-26 RX ADMIN — MAGNESIUM SULFATE IN WATER 2 G: 40 INJECTION, SOLUTION INTRAVENOUS at 11:04

## 2017-04-26 RX ADMIN — OXYCODONE HYDROCHLORIDE 10 MG: 5 TABLET ORAL at 01:04

## 2017-04-26 RX ADMIN — OXYCODONE HYDROCHLORIDE 5 MG: 5 TABLET ORAL at 07:04

## 2017-04-26 RX ADMIN — CYANOCOBALAMIN TAB 250 MCG 250 MCG: 250 TAB at 08:04

## 2017-04-26 RX ADMIN — ALPRAZOLAM 0.25 MG: 0.25 TABLET ORAL at 08:04

## 2017-04-26 RX ADMIN — BUTALBITAL, ACETAMINOPHEN AND CAFFEINE 1 TABLET: 50; 325; 40 TABLET ORAL at 06:04

## 2017-04-26 RX ADMIN — ALPRAZOLAM 0.25 MG: 0.25 TABLET ORAL at 03:04

## 2017-04-26 RX ADMIN — NAPROXEN 375 MG: 375 TABLET ORAL at 02:04

## 2017-04-26 RX ADMIN — BUTALBITAL, ACETAMINOPHEN AND CAFFEINE 1 TABLET: 50; 325; 40 TABLET ORAL at 03:04

## 2017-04-26 RX ADMIN — ZOLPIDEM TARTRATE 5 MG: 5 TABLET, FILM COATED ORAL at 09:04

## 2017-04-26 NOTE — PLAN OF CARE
Problem: Patient Care Overview  Goal: Plan of Care Review  Outcome: Ongoing (interventions implemented as appropriate)  POC reviewed with the patient. Verbalized understanding. Fall precautions and safety maintained. No falls/injury this shift. Pt able to ambulate with standby assist. No new skin impairments noted. AAOx4. Afebrile. VSS. IVF @ 100 ml/hr continued. Required assistance with pain management overnight, PRN medication given. Pt progressing toward goals. Will continue to monitor.

## 2017-04-26 NOTE — ASSESSMENT & PLAN NOTE
- No significant changes in numbness since prior admission  - continue gabapentin PRN, continue vitamins, ALA not on formulary  - Autoimmune panel added on

## 2017-04-26 NOTE — PROGRESS NOTES
[facility]  Neurology  Progress Note    Patient Name: Eunice Hickman  MRN: [id number]  Admission Date: 4/25/2017  Hospital Length of Stay: 0 days  Code Status: Full Code   Attending Provider: Fabien Wilkerson MD  Primary Care Physician: Abigail Roberts MD   Principal Problem:Post-dural puncture headache      Subjective:     Interval History: No events overnight. She says she had mild relief yesterday, but by the evening her headache had returned. She tried oxycodone once, but did not like the way it made her feel. She says the caffeine make her tremulous but was tolerable, and the fioricet made her tremulous with minimal relief. She has been trying to take as much coffee and water by mouth as she can.       Current Facility-Administered Medications   Medication Dose Route Frequency Provider Last Rate Last Dose    0.9%  NaCl infusion   Intravenous Continuous Pedro Frederick PA-C 100 mL/hr at 04/25/17 2014      acetaminophen tablet 650 mg  650 mg Oral Q6H PRN Rosanne Willis PA-C   650 mg at 04/25/17 2226    alprazolam tablet 0.25 mg  0.25 mg Oral BID PRN GABE Gilbert-C   0.25 mg at 04/26/17 0858    butalbital-acetaminophen-caffeine -40 mg per tablet 1 tablet  1 tablet Oral Q4H PRN Pedro Frederick PA-C   1 tablet at 04/26/17 0940    cyanocobalamin tablet 250 mcg  250 mcg Oral Daily Pedro Frederick PA-C   250 mcg at 04/26/17 0826    dextrose 50% injection 12.5 g  12.5 g Intravenous PRN Pedro Frederick PA-C        dextrose 50% injection 25 g  25 g Intravenous PRN Pedro Frederick PA-C        gabapentin capsule 100 mg  100 mg Oral TID PRN Pedro Frederick PA-C        glucagon (human recombinant) injection 1 mg  1 mg Intramuscular PRN Pedro Frederick PA-C        glucose chewable tablet 16 g  16 g Oral PRN Pedro Frederick PA-C        glucose chewable tablet 24 g  24 g Oral PRN Pedro Frederick PA-C        hydrocodone-acetaminophen 10-325mg per tablet 1 tablet  1 tablet Oral Q6H PRN  [place] Rg PA-C        hydrocodone-acetaminophen 5-325mg per tablet 1 tablet  1 tablet Oral Q6H PRN [place] Rg PA-C        ondansetron disintegrating tablet 8 mg  8 mg Oral Q8H PRN Pedro Frederick PA-C        promethazine tablet 25 mg  25 mg Oral Q6H PRN Pedro Frederick PA-C        senna-docusate 8.6-50 mg per tablet 1 tablet  1 tablet Oral BID Pedro Frederick PA-C   1 tablet at 04/25/17 2156    zolpidem tablet 5 mg  5 mg Oral Nightly PRN Pedro Frederick PA-C   5 mg at 04/25/17 2156       Review of Systems   Cardiovascular: Negative for leg swelling.   Musculoskeletal: Positive for back pain.   Neurological: Positive for headaches. Negative for weakness.     Objective:     Vital Signs (Most Recent):  Temp: 98 °F (36.7 °C) (04/26/17 0800)  Pulse: 79 (04/26/17 0800)  Resp: 20 (04/26/17 0800)  BP: 109/69 (04/26/17 0800)  SpO2: 100 % (04/26/17 0800) Vital Signs (24h Range):  Temp:  [98 °F (36.7 °C)-98.8 °F (37.1 °C)] 98 °F (36.7 °C)  Pulse:  [58-91] 79  Resp:  [16-20] 20  SpO2:  [99 %-100 %] 100 %  BP: (102-117)/(56-76) 109/69     Weight: 61.2 kg (135 lb)  Body mass index is 23.17 kg/(m^2).    Physical Exam   Constitutional: She is oriented to person, place, and time. She appears well-developed and well-nourished.   Eyes: EOM are normal. Pupils are equal, round, and reactive to light.   Neurological: She is oriented to person, place, and time. She has normal strength.   Nursing note and vitals reviewed.      NEUROLOGICAL EXAMINATION:     MENTAL STATUS   Oriented to person, place, and time.   Level of consciousness: alert    CRANIAL NERVES     CN III, IV, VI   Pupils are equal, round, and reactive to light.  Extraocular motions are normal.     CN V   Facial sensation intact.     CN VII   Facial expression full, symmetric.     MOTOR EXAM   Muscle bulk: normal  Overall muscle tone: normal    Strength   Strength 5/5 throughout.     SENSORY EXAM        Loss of temperature and vibration distal from  mid-shin and wrists bilaterally. Length dependent.        Significant Labs:   CBC:   Recent Labs  Lab 04/25/17  1150 04/25/17  1847 04/26/17  0626   WBC  --  9.15 7.58   HGB  --  12.0 11.7*   HCT 39 35.3* 35.3*   PLT  --  177 186     CMP:   Recent Labs  Lab 04/25/17  1847 04/26/17  0626   GLU 97 113*    139   K 3.4* 4.0   * 107   CO2 23 23   BUN 8 7   CREATININE 0.7 0.7   CALCIUM 8.3* 8.7   MG 1.8  --    ANIONGAP 7* 9   EGFRNONAA >60.0 >60.0       Significant Imaging: I have reviewed all pertinent imaging results/findings within the past 24 hours.    Assessment and Plan:     * Post-dural puncture headache  Mrs. Hickman is a 42 year old woman who is admitted for a post-LP headache. She had some mild, initial relief with IV caffiene and fluids, with minimal relief from PRNs. She was not receiving scheduled fioricet, and had about 12 hours without any analgesics. Anesthesia has been consulted and have provided recommendations for conservative management, and will reevaluate to determine if she will be a candidate for a blood patch should conservative measures not work.     Recommendations:  -- Continue IV fluids and additional one time of IV caffeine per primary team  -- Consider scheduled Magnesium sulfate 2g BID fur additional relief  -- Continue fioricet and oxycodone (as tolerated) per anesthesia recs (appreciated) - consider scheduling fioricet per their recs      VTE Risk Mitigation         Ordered     Place ROBLES hose  Until discontinued      04/25/17 1813     Place sequential compression device  Until discontinued      04/25/17 1813     Medium Risk of VTE  Once      04/25/17 1813            Anika Foster MD  Neurology  [facility]

## 2017-04-26 NOTE — SUBJECTIVE & OBJECTIVE
Subjective:     Interval History: No events overnight. She says she had mild relief yesterday, but by the evening her headache had returned. She tried oxycodone once, but did not like the way it made her feel. She says the caffeine make her tremulous but was tolerable, and the fioricet made her tremulous with minimal relief. She has been trying to take as much coffee and water by mouth as she can.       Current Facility-Administered Medications   Medication Dose Route Frequency Provider Last Rate Last Dose    0.9%  NaCl infusion   Intravenous Continuous Pedro Frederick PA-C 100 mL/hr at 04/25/17 2014      acetaminophen tablet 650 mg  650 mg Oral Q6H PRN Rosanne Willis PA-C   650 mg at 04/25/17 2226    alprazolam tablet 0.25 mg  0.25 mg Oral BID PRN Pedro Frederick PA-C   0.25 mg at 04/26/17 0858    butalbital-acetaminophen-caffeine -40 mg per tablet 1 tablet  1 tablet Oral Q4H PRN Pedro Frederick PA-C   1 tablet at 04/26/17 0940    cyanocobalamin tablet 250 mcg  250 mcg Oral Daily Pedro Frederick PA-C   250 mcg at 04/26/17 0826    dextrose 50% injection 12.5 g  12.5 g Intravenous PRN Pedro Frdeerick PA-C        dextrose 50% injection 25 g  25 g Intravenous PRN Pedro Frederick PA-C        gabapentin capsule 100 mg  100 mg Oral TID PRN Pedro Frederick PA-C        glucagon (human recombinant) injection 1 mg  1 mg Intramuscular PRN Pedro Frederick PA-C        glucose chewable tablet 16 g  16 g Oral PRN Pedro Frederick PA-C        glucose chewable tablet 24 g  24 g Oral PRN Pedro Frederick PA-C        hydrocodone-acetaminophen 10-325mg per tablet 1 tablet  1 tablet Oral Q6H PRN Maine SGABE Tobias-TIFFANIE        hydrocodone-acetaminophen 5-325mg per tablet 1 tablet  1 tablet Oral Q6H PRN Maine SJuanjo Rg PA-C        ondansetron disintegrating tablet 8 mg  8 mg Oral Q8H PRN Pedro Frederick PA-C        promethazine tablet 25 mg  25 mg Oral Q6H PRN Pedro Frederick PA-C        senna-docusate 8.6-50 mg per  tablet 1 tablet  1 tablet Oral BID Pedro Frederick PA-C   1 tablet at 04/25/17 2156    zolpidem tablet 5 mg  5 mg Oral Nightly PRN Pedro Frederick PA-C   5 mg at 04/25/17 2156       Review of Systems   Cardiovascular: Negative for leg swelling.   Musculoskeletal: Positive for back pain.   Neurological: Positive for headaches. Negative for weakness.     Objective:     Vital Signs (Most Recent):  Temp: 98 °F (36.7 °C) (04/26/17 0800)  Pulse: 79 (04/26/17 0800)  Resp: 20 (04/26/17 0800)  BP: 109/69 (04/26/17 0800)  SpO2: 100 % (04/26/17 0800) Vital Signs (24h Range):  Temp:  [98 °F (36.7 °C)-98.8 °F (37.1 °C)] 98 °F (36.7 °C)  Pulse:  [58-91] 79  Resp:  [16-20] 20  SpO2:  [99 %-100 %] 100 %  BP: (102-117)/(56-76) 109/69     Weight: 61.2 kg (135 lb)  Body mass index is 23.17 kg/(m^2).    Physical Exam   Constitutional: She is oriented to person, place, and time. She appears well-developed and well-nourished.   Eyes: EOM are normal. Pupils are equal, round, and reactive to light.   Neurological: She is oriented to person, place, and time. She has normal strength.   Nursing note and vitals reviewed.      NEUROLOGICAL EXAMINATION:     MENTAL STATUS   Oriented to person, place, and time.   Level of consciousness: alert    CRANIAL NERVES     CN III, IV, VI   Pupils are equal, round, and reactive to light.  Extraocular motions are normal.     CN V   Facial sensation intact.     CN VII   Facial expression full, symmetric.     MOTOR EXAM   Muscle bulk: normal  Overall muscle tone: normal    Strength   Strength 5/5 throughout.     SENSORY EXAM        Loss of temperature and vibration distal from mid-shin and wrists bilaterally. Length dependent.        Significant Labs:   CBC:   Recent Labs  Lab 04/25/17  1150 04/25/17  1847 04/26/17  0626   WBC  --  9.15 7.58   HGB  --  12.0 11.7*   HCT 39 35.3* 35.3*   PLT  --  177 186     CMP:   Recent Labs  Lab 04/25/17  1847 04/26/17  0626   GLU 97 113*    139   K 3.4* 4.0   *  107   CO2 23 23   BUN 8 7   CREATININE 0.7 0.7   CALCIUM 8.3* 8.7   MG 1.8  --    ANIONGAP 7* 9   EGFRNONAA >60.0 >60.0       Significant Imaging: I have reviewed all pertinent imaging results/findings within the past 24 hours.   home

## 2017-04-26 NOTE — SUBJECTIVE & OBJECTIVE
Interval History: No acute events overnight. Patient had initial improvement in headache but now symptoms have returned and now has feeling of tension headache. Case discussed with neurology and anesthesia.    Review of Systems   Constitutional: Negative for appetite change, chills, fatigue and fever.   Respiratory: Negative for cough, chest tightness, shortness of breath and wheezing.    Cardiovascular: Negative for chest pain, palpitations and leg swelling.   Gastrointestinal: Positive for nausea. Negative for abdominal pain, constipation, diarrhea and vomiting.   Musculoskeletal: Negative for arthralgias, back pain and joint swelling.   Neurological: Positive for numbness (baseline numbness in distal extremities) and headaches. Negative for seizures, syncope, facial asymmetry, speech difficulty, weakness and light-headedness.     Objective:     Vital Signs (Most Recent):  Temp: 96.7 °F (35.9 °C) (04/26/17 1200)  Pulse: 66 (04/26/17 1200)  Resp: 18 (04/26/17 1200)  BP: (!) 108/58 (04/26/17 1200)  SpO2: 98 % (04/26/17 1200) Vital Signs (24h Range):  Temp:  [96.7 °F (35.9 °C)-98.8 °F (37.1 °C)] 96.7 °F (35.9 °C)  Pulse:  [58-87] 66  Resp:  [18-20] 18  SpO2:  [98 %-100 %] 98 %  BP: (102-114)/(56-69) 108/58     Weight: 61.2 kg (135 lb)  Body mass index is 23.17 kg/(m^2).  No intake or output data in the 24 hours ending 04/26/17 1632   Physical Exam   Constitutional: She is oriented to person, place, and time. She appears well-developed and well-nourished. No distress.   HENT:   Head: Normocephalic and atraumatic.   Neck: Normal range of motion. Neck supple. No JVD present.   Cardiovascular: Normal rate, regular rhythm and intact distal pulses.    No murmur heard.  Pulmonary/Chest: Effort normal and breath sounds normal. She has no wheezes. She has no rales.   Abdominal: Soft. Bowel sounds are normal. She exhibits no distension and no mass. There is no tenderness. There is no guarding.   Neurological: She is alert and  oriented to person, place, and time. No cranial nerve deficit.     Significant Labs:   BMP:   Recent Labs  Lab 04/25/17 1847 04/26/17  0626   GLU 97 113*    139   K 3.4* 4.0   * 107   CO2 23 23   BUN 8 7   CREATININE 0.7 0.7   CALCIUM 8.3* 8.7   MG 1.8  --      CBC:   Recent Labs  Lab 04/25/17  1150 04/25/17 1847 04/26/17  0626   WBC  --  9.15 7.58   HGB  --  12.0 11.7*   HCT 39 35.3* 35.3*   PLT  --  177 186     All pertinent labs within the past 24 hours have been reviewed.

## 2017-04-26 NOTE — CONSULTS
"Pt seen and examined.  Well-known to the neurology service.  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  Lumbar puncture was performed on Friday.  Patient experienced mild HA on Saturday, but discharged without incident.    On Sunday, she experienced postural headache, worse with sitting and standing, posterior nuchal, tightness, with nausea without vomiting.  She had some early relief with NSAIDs, fluids.  Didn't take much caffeine.  Was not better on Monday, and was worse on Tuesday.    No focal neurologic features since onset of HA.  Numbness/pain in feet>hands persists.    No current facility-administered medications on file prior to encounter.      Current Outpatient Prescriptions on File Prior to Encounter   Medication Sig Dispense Refill    alpha lipoic acid 600 mg Cap Take 1 tablet by mouth once daily. 30 each 5    CYANOCOBALAMIN, VITAMIN B-12, (VITAMIN B-12 ORAL) Take by mouth.      gabapentin (NEURONTIN) 100 MG capsule Take 1 capsule (100 mg total) by mouth 3 (three) times daily as needed (pain). 90 capsule 5    zolpidem (AMBIEN) 5 MG Tab TK 1 T PO QHS PRF INSOMNIA  0    naproxen sodium (ANAPROX) 550 MG tablet TK 1 T PO BID WF  0    [DISCONTINUED] prenatal vit #108-iron-FA 30 mg iron- 800 mcg Tab Take by mouth.       /67 (BP Location: Left arm, Patient Position: Lying, BP Method: Automatic)  Pulse (!) 58  Temp 98.8 °F (37.1 °C) (Oral)   Resp 18  Ht 5' 4" (1.626 m)  Wt 61.2 kg (135 lb)  LMP 04/06/2017 (Approximate)  SpO2 100%  BMI 23.17 kg/m2    Gen: supine, sullen, MIL at bedside initially -  tonight  MS: alert, oriented, conversant  CN: EOMI, V1-V3 intact, face symmetric, tongue midline  MO: 5/5 power, nml bulk and tone  SE: length dependent loss of temp>vibration to mid shin and wrists B  CO: mild tremulousness  GA: not assessed  DTRs: brisk 2+ and " symmetric throughout - stable    Lumbar MRI: no clear evidence of epidural CSF collection.  Ovarian cyst seen,    Impression:  1) Post-dural puncture headache.  POD#4.  Classic features.  Admitted after lumbar MRI.    2) Query small fiber painful neuropathy or something akin to HNPP, but also consider fibromyalgia. Myofascial pain syndrome or other rheum not ruled out, but hard to explain the length dependent sensory changes., unchanged.    Recs  1) Blood patch remains definitive treatment.  2) Conservative tx with IVF, caffeine, pain meds overnight.  3) Anesthesia if needed.  4) Added autoimmune panel to labs    Harley Koch MD, MPH  Division of Movement and Memory Disorders  [facility]

## 2017-04-26 NOTE — PROGRESS NOTES
[facility] Hospital Medicine  Progress Note    Patient Name: Eunice Hickman  MRN: [id number]  Patient Class: OP- Observation   Admission Date: 4/25/2017  Length of Stay: 0 days  Attending Physician: Fabien Wilkerson MD  Primary Care Provider: Abigail Roberts MD    [facility] Hospital Medicine Team: [facility] [place] Rg PA-C    Subjective:     Principal Problem:Post-dural puncture headache    HPI:  42F with no significant PMHx being admitted to observation for intractable headache s/p lumbar puncture on 4/21. The patient underwent an LP to r/o MS in setting of new onset numbness (MRI and LP unremarkable). The headache began very mildly on 4/22 but progressed to 10/10 by the next day. She describes it as a dull throbbing pain in the posterior aspect with radiation to her eyes. Her headache is worsened with postural changes (including sitting up and and sitting on toilet) and improves when supine. She has been taking naproxen, gabapentin, and tylenol at home with no relief. She endorses nausea but no vomiting, but attributes this to her new medications. She denies lightheadedness/dizziness, worsening numbness/weakness, B/B incontinence, fevers, vision changes, chest pain, shortness of breath, dysuria.    In the ED, patient received IV caffeine, fiorecet, and fluids while maintained in supine position. Neurology and anesthesia were consulted and examined the patient. PRN pain medications were ordered and aggressive conservative therapy was recommended. She voiced improvement in her symptoms with treatment in the ED.      Hospital Course:  Patient admitted to observation for intractable headache s/p LP on 4/21. Neurology and anesthesia were consulted and recommend aggressive conservative therapy. Optimized therapy today and continue to monitor.     Interval History: No acute events overnight. Patient had initial improvement in headache but now symptoms have returned and now has feeling of  tension headache. Case discussed with neurology and anesthesia.    Review of Systems   Constitutional: Negative for appetite change, chills, fatigue and fever.   Respiratory: Negative for cough, chest tightness, shortness of breath and wheezing.    Cardiovascular: Negative for chest pain, palpitations and leg swelling.   Gastrointestinal: Positive for nausea. Negative for abdominal pain, constipation, diarrhea and vomiting.   Musculoskeletal: Negative for arthralgias, back pain and joint swelling.   Neurological: Positive for numbness (baseline numbness in distal extremities) and headaches. Negative for seizures, syncope, facial asymmetry, speech difficulty, weakness and light-headedness.     Objective:     Vital Signs (Most Recent):  Temp: 96.7 °F (35.9 °C) (04/26/17 1200)  Pulse: 66 (04/26/17 1200)  Resp: 18 (04/26/17 1200)  BP: (!) 108/58 (04/26/17 1200)  SpO2: 98 % (04/26/17 1200) Vital Signs (24h Range):  Temp:  [96.7 °F (35.9 °C)-98.8 °F (37.1 °C)] 96.7 °F (35.9 °C)  Pulse:  [58-87] 66  Resp:  [18-20] 18  SpO2:  [98 %-100 %] 98 %  BP: (102-114)/(56-69) 108/58     Weight: 61.2 kg (135 lb)  Body mass index is 23.17 kg/(m^2).  No intake or output data in the 24 hours ending 04/26/17 1632   Physical Exam   Constitutional: She is oriented to person, place, and time. She appears well-developed and well-nourished. No distress.   HENT:   Head: Normocephalic and atraumatic.   Neck: Normal range of motion. Neck supple. No JVD present.   Cardiovascular: Normal rate, regular rhythm and intact distal pulses.    No murmur heard.  Pulmonary/Chest: Effort normal and breath sounds normal. She has no wheezes. She has no rales.   Abdominal: Soft. Bowel sounds are normal. She exhibits no distension and no mass. There is no tenderness. There is no guarding.   Neurological: She is alert and oriented to person, place, and time. No cranial nerve deficit.     Significant Labs:   BMP:   Recent Labs  Lab 04/25/17  1847 04/26/17  0626    GLU 97 113*    139   K 3.4* 4.0   * 107   CO2 23 23   BUN 8 7   CREATININE 0.7 0.7   CALCIUM 8.3* 8.7   MG 1.8  --      CBC:   Recent Labs  Lab 04/25/17  1150 04/25/17  1847 04/26/17  0626   WBC  --  9.15 7.58   HGB  --  12.0 11.7*   HCT 39 35.3* 35.3*   PLT  --  177 186     All pertinent labs within the past 24 hours have been reviewed.      Assessment/Plan:      * Post-dural puncture headache  - S/P LP 04/21/2017 for evaluation of new onset numbness to r/o MS (MRI/LP unremarkable). Did well after procedure, discharged 04/22, awoke 04/23 with pounding HA, worse with postural changes, improved when supine. No focal neurologic deficits on exam, numbness remains unchanged. Received caffeine and fluids in ED with improvement in symptoms but now recurrence.   - MRI L spine without acute findings  - Neurology consulted and recs appreciated  - Anesthesia following  - Optimize conservative treatment today and if still symptomatic consider blood patch tomorrow  - Change fioricet to q 4 hours, IVFs, and Oxycodone PRN as tolerated  - Give IV magnesium and additional dose of IV caffeine today  - neuro checks    Numbness  - No significant changes in numbness since prior admission  - continue gabapentin PRN, continue vitamins, ALA not on formulary  - Autoimmune panel added on    Anxiety  - continue alprazolam PRN    Cyst of left ovary  - Incidentally found on MRI of L spine.  - Probable 5.7 cm left ovarian cyst.  Further evaluation with a pelvic ultrasound exam is recommended in 6 weeks to ensure resolution.    VTE Risk Mitigation         Ordered     Place ROBLES hose  Until discontinued      04/25/17 1813     Place sequential compression device  Until discontinued      04/25/17 1813     Medium Risk of VTE  Once      04/25/17 1813          Maine Rg PA-C  Department of Hospital Medicine   [facility]

## 2017-04-26 NOTE — PLAN OF CARE
Abigail Roberts MD  [address] [place] [facility] / NEW *    Future Appointments  Date Time Provider Department Center   5/1/2017 1:00 PM Maria De Jesus Ames MD [facility] [address] [place] [address] [address]  [address]  [facility] [address]  Phone: [phone] Fax: [phone]      Payor: BLUE CROSS BLUE SHIELD / Plan: BCBS PFERRD CARE PPO - HD / Product Type: PPO /        04/26/17 1303   Discharge Assessment   Assessment Type Discharge Planning Assessment   Confirmed/corrected address and phone number on facesheet? Yes   Assessment information obtained from? Patient   Prior to hospitilization cognitive status: Alert/Oriented   Prior to hospitalization functional status: Independent   Current cognitive status: Alert/Oriented   Current Functional Status: Independent   Arrived From home or self-care   Lives With spouse   Able to Return to Prior Arrangements yes   Is patient able to care for self after discharge? Yes   Who are your caregiver(s) and their phone number(s)? Santana Hickman  spouse  [phone]   Patient's perception of discharge disposition home or selfcare   Patient currently receives home health services? No   Patient currently receives any other outside agency services? No   Equipment Currently Used at Home none   Does the patient have transportation to healthcare appointments? Yes   Transportation Available family or friend will provide   On Dialysis? No   Discharge Plan A Home with family   Patient/Family In Agreement With Plan yes

## 2017-04-26 NOTE — PROGRESS NOTES
Paged IM E in regards to pt requesting naproxen for back pain. Awaiting reply.    4/26/17 0144 - Spoke with ALEXIA CHANCE in regards to above nsg note. New orders pending. Will continue to monitor.

## 2017-04-26 NOTE — PROGRESS NOTES
Paged ALEXIA CHANCE in regards to pt requesting medication for headache. Pt states oxycodone makes her jittery and firocet will keep her awake. Awaiting reply.    4/25/17 6193 - Spoke with Med E in regards to above nsg note. New orders pending. Will continue to monitor.

## 2017-04-26 NOTE — ASSESSMENT & PLAN NOTE
- S/P LP 04/21/2017 for evaluation of new onset numbness to r/o MS (MRI/LP unremarkable). Did well after procedure, discharged 04/22, awoke 04/23 with pounding HA, worse with postural changes, improved when supine. No focal neurologic deficits on exam, numbness remains unchanged. Received caffeine and fluids in ED with improvement in symptoms but now recurrence.   - MRI L spine without acute findings  - Neurology consulted and recs appreciated  - Anesthesia following  - Optimize conservative treatment today and if still symptomatic consider blood patch tomorrow  - Change fioricet to q 4 hours, IVFs, and Oxycodone PRN as tolerated  - Give IV magnesium and additional dose of IV caffeine today  - neuro checks

## 2017-04-26 NOTE — ASSESSMENT & PLAN NOTE
- Incidentally found on MRI of L spine.  - Probable 5.7 cm left ovarian cyst.  Further evaluation with a pelvic ultrasound exam is recommended in 6 weeks to ensure resolution.

## 2017-04-26 NOTE — PROGRESS NOTES
Evaluated patient at bedside with concerns for PDPH s/p lumbar puncture (4/21/17)     Eunice Hickman is a 42 y.o. female with no significant past medical history who developed numbness and tingling in her arms and feet over the past month who was evaluated by neurology for MS with an LP.      Patient with hx of previous c/s x 3 she believes with spinals (unsure if she got epidural) without any significant complication.      After the procedure the patient states was doing well Friday afternoon and  discharged Saturday. Sunday morning she woke up with a pounding headache. Describes as dull, throbbing, 10/10 pain in the posterior aspect with radiation to eyes. Headache is associated with worsening pain with postural changes (including sitting up and sitting on toilet) and improves when lying down and rest.     Has been taking naproxen, gabapentin, and tylenol at home.      Has some nausea but no vomiting. Describes decreased PO intake over the past several days. But no lightheadedness, or dizziness.      Denies any recent fevers, chills, change in vision, no chest pain, shortness of breath or difficulty breathing. Denies any changes in bladder or bowel habits, no increased weakness or change in sensation in BLE.     PE:  AAOx4, in no acute distress resting comfortably in bed eating crackers  NSR, WNL RR.   5/5 strength in BLE, sensation is intact to light touch BLE.     Assessment:   Eunice is a 43 yo female with no significant PMH s/p LP (4/20/17) who presented with symptoms consistent with PDPH. She is at increased risk for PDPH as she has virtually all of the risk factors being a young female with normal BMI in which a large bore beveled (Quincke) needle was used for her procedure.     A second long discussion with the patient regarding management was performed today (4/26/17). Her headache is an 8/10 at worst when sitting upright. The pt did not receive the conservative therapy as outlined below. She only  "received fiorcet three times over 24 hrs as the order was placed PRN. The oxycodone was switched to hydrocodone-acetaminophen as the pt was "jittery" yesterday and attributed to the oxycodone. This "jittery" sensation is likely 2/2 the caffeine. The pt would prefer to continue conservative management with the recommendations below. The plan was discussed in depth with the patient and bedside RN and she verbalized understanding. The pt would prefer to trial conservative management until tomorrow (4/27) and if her headache is not tolerable with this regimen she would prefer the epidural blood patch at that point in time. If her headache is tolerable with this regimen, she would prefer to be discharged with outpt conservative management.     Risks and benefits were discussed considering conservative vs interventional management. As well, the progression of PDPH was discussed.      Plan   Cont MIVFs at 100ml/hr    Schedule fiorcet q4 hours for persistent headache, maximum 6 tablets/day     PRN oxycodone for pain q3 hours (5mg 4-5/10 pain, 10mg 6-7/10 pain, and 15mg for 8-10/10 pain)    PRN zofran q8 for nausea    Will re-evaluate pt tomorrow and determine whether epidural blood patch or continued conservative management is most appropriate therapy.      Generally speaking up to 75% of PDPH resolves with aggressive conservative therapy after 5-7 days.      Jovon Rose MD   [id number]   "

## 2017-04-27 ENCOUNTER — NURSE TRIAGE (OUTPATIENT)
Dept: ADMINISTRATIVE | Facility: CLINIC | Age: 42
End: 2017-04-27

## 2017-04-27 VITALS
WEIGHT: 135 LBS | SYSTOLIC BLOOD PRESSURE: 103 MMHG | OXYGEN SATURATION: 100 % | HEIGHT: 64 IN | BODY MASS INDEX: 23.05 KG/M2 | RESPIRATION RATE: 20 BRPM | TEMPERATURE: 99 F | DIASTOLIC BLOOD PRESSURE: 59 MMHG | HEART RATE: 61 BPM

## 2017-04-27 LAB
ANION GAP SERPL CALC-SCNC: 6 MMOL/L
BASOPHILS # BLD AUTO: 0.02 K/UL
BASOPHILS NFR BLD: 0.3 %
BUN SERPL-MCNC: 8 MG/DL
CALCIUM SERPL-MCNC: 8.5 MG/DL
CHLORIDE SERPL-SCNC: 108 MMOL/L
CO2 SERPL-SCNC: 24 MMOL/L
CREAT SERPL-MCNC: 0.8 MG/DL
DIFFERENTIAL METHOD: ABNORMAL
EOSINOPHIL # BLD AUTO: 0.6 K/UL
EOSINOPHIL NFR BLD: 8.9 %
ERYTHROCYTE [DISTWIDTH] IN BLOOD BY AUTOMATED COUNT: 12.8 %
EST. GFR  (AFRICAN AMERICAN): >60 ML/MIN/1.73 M^2
EST. GFR  (NON AFRICAN AMERICAN): >60 ML/MIN/1.73 M^2
GLUCOSE SERPL-MCNC: 94 MG/DL
HCT VFR BLD AUTO: 34.5 %
HGB BLD-MCNC: 11.7 G/DL
LYMPHOCYTES # BLD AUTO: 2.3 K/UL
LYMPHOCYTES NFR BLD: 34.7 %
MAGNESIUM SERPL-MCNC: 1.9 MG/DL
MCH RBC QN AUTO: 30.5 PG
MCHC RBC AUTO-ENTMCNC: 33.9 %
MCV RBC AUTO: 90 FL
MONOCYTES # BLD AUTO: 0.3 K/UL
MONOCYTES NFR BLD: 5.2 %
NEUTROPHILS # BLD AUTO: 3.3 K/UL
NEUTROPHILS NFR BLD: 50.7 %
PLATELET # BLD AUTO: 181 K/UL
PMV BLD AUTO: 9 FL
POTASSIUM SERPL-SCNC: 3.7 MMOL/L
RBC # BLD AUTO: 3.84 M/UL
SODIUM SERPL-SCNC: 138 MMOL/L
WBC # BLD AUTO: 6.52 K/UL

## 2017-04-27 PROCEDURE — 99226 PR SUBSEQUENT OBSERVATION CARE,LEVEL III: CPT | Mod: ,,, | Performed by: PSYCHIATRY & NEUROLOGY

## 2017-04-27 PROCEDURE — 83735 ASSAY OF MAGNESIUM: CPT

## 2017-04-27 PROCEDURE — 25000003 PHARM REV CODE 250: Performed by: PHYSICIAN ASSISTANT

## 2017-04-27 PROCEDURE — 99217 PR OBSERVATION CARE DISCHARGE: CPT | Mod: ,,, | Performed by: PHYSICIAN ASSISTANT

## 2017-04-27 PROCEDURE — G0378 HOSPITAL OBSERVATION PER HR: HCPCS

## 2017-04-27 PROCEDURE — 36415 COLL VENOUS BLD VENIPUNCTURE: CPT

## 2017-04-27 PROCEDURE — 85025 COMPLETE CBC W/AUTO DIFF WBC: CPT

## 2017-04-27 PROCEDURE — 80048 BASIC METABOLIC PNL TOTAL CA: CPT

## 2017-04-27 RX ORDER — OXYCODONE HYDROCHLORIDE 5 MG/1
5 TABLET ORAL
Qty: 30 TABLET | Refills: 0 | Status: SHIPPED | OUTPATIENT
Start: 2017-04-27 | End: 2017-04-27

## 2017-04-27 RX ORDER — ONDANSETRON 4 MG/1
4 TABLET, ORALLY DISINTEGRATING ORAL EVERY 6 HOURS PRN
Qty: 30 TABLET | Refills: 0 | Status: SHIPPED | OUTPATIENT
Start: 2017-04-27 | End: 2017-04-27

## 2017-04-27 RX ORDER — BUTALBITAL, ACETAMINOPHEN AND CAFFEINE 50; 325; 40 MG/1; MG/1; MG/1
1 TABLET ORAL EVERY 4 HOURS
Qty: 30 TABLET | Refills: 0 | Status: SHIPPED | OUTPATIENT
Start: 2017-04-27 | End: 2017-05-27

## 2017-04-27 RX ORDER — ONDANSETRON 4 MG/1
4 TABLET, ORALLY DISINTEGRATING ORAL EVERY 6 HOURS PRN
Qty: 30 TABLET | Refills: 0 | OUTPATIENT
Start: 2017-04-27 | End: 2017-04-27

## 2017-04-27 RX ORDER — BUTALBITAL, ACETAMINOPHEN AND CAFFEINE 50; 325; 40 MG/1; MG/1; MG/1
1 TABLET ORAL EVERY 4 HOURS
Qty: 30 TABLET | Refills: 0 | OUTPATIENT
Start: 2017-04-27 | End: 2017-04-27

## 2017-04-27 RX ORDER — ONDANSETRON 4 MG/1
4 TABLET, ORALLY DISINTEGRATING ORAL EVERY 6 HOURS PRN
Qty: 30 TABLET | Refills: 0 | Status: SHIPPED | OUTPATIENT
Start: 2017-04-27 | End: 2023-09-20 | Stop reason: ALTCHOICE

## 2017-04-27 RX ORDER — BUTALBITAL, ACETAMINOPHEN AND CAFFEINE 50; 325; 40 MG/1; MG/1; MG/1
1 TABLET ORAL EVERY 4 HOURS
Qty: 30 TABLET | Refills: 0 | Status: SHIPPED | OUTPATIENT
Start: 2017-04-27 | End: 2017-04-27

## 2017-04-27 RX ORDER — OXYCODONE HYDROCHLORIDE 5 MG/1
5 TABLET ORAL
Qty: 30 TABLET | Refills: 0 | Status: SHIPPED | OUTPATIENT
Start: 2017-04-27

## 2017-04-27 RX ADMIN — ALPRAZOLAM 0.25 MG: 0.25 TABLET ORAL at 10:04

## 2017-04-27 RX ADMIN — BUTALBITAL, ACETAMINOPHEN AND CAFFEINE 1 TABLET: 50; 325; 40 TABLET ORAL at 05:04

## 2017-04-27 RX ADMIN — BUTALBITAL, ACETAMINOPHEN AND CAFFEINE 1 TABLET: 50; 325; 40 TABLET ORAL at 10:04

## 2017-04-27 RX ADMIN — OXYCODONE HYDROCHLORIDE 5 MG: 5 TABLET ORAL at 01:04

## 2017-04-27 RX ADMIN — OXYCODONE HYDROCHLORIDE 5 MG: 5 TABLET ORAL at 09:04

## 2017-04-27 RX ADMIN — BUTALBITAL, ACETAMINOPHEN AND CAFFEINE 1 TABLET: 50; 325; 40 TABLET ORAL at 01:04

## 2017-04-27 RX ADMIN — CYANOCOBALAMIN TAB 250 MCG 250 MCG: 250 TAB at 09:04

## 2017-04-27 RX ADMIN — ONDANSETRON 8 MG: 8 TABLET, ORALLY DISINTEGRATING ORAL at 01:04

## 2017-04-27 RX ADMIN — STANDARDIZED SENNA CONCENTRATE AND DOCUSATE SODIUM 1 TABLET: 8.6; 5 TABLET, FILM COATED ORAL at 09:04

## 2017-04-27 NOTE — PROGRESS NOTES
Evaluated patient at bedside with concerns for PDPH s/p lumbar puncture (4/21/17)     HPI  Eunice Hickman is a 42 y.o. female with no significant past medical history who developed numbness and tingling in her arms and feet over the past month who was evaluated by neurology for MS with an LP.      Patient with hx of previous c/s x 3 she believes with spinals (unsure if she got epidural) without any significant complication.      After the procedure the patient states was doing well Friday afternoon and discharged Saturday. Sunday morning she woke up with a pounding headache. Describes as dull, throbbing, 10/10 pain in the posterior aspect with radiation to eyes. Headache is associated with worsening pain with postural changes (including sitting up and sitting on toilet) and improves when lying down and rest.     Has been taking naproxen, gabapentin, and tylenol at home.      Has some nausea but no vomiting. Describes decreased PO intake over the past several days. But no lightheadedness, or dizziness.      Denies any recent fevers, chills, change in vision, no chest pain, shortness of breath or difficulty breathing. Denies any changes in bladder or bowel habits, no increased weakness or change in sensation in BLE.     Interval hx   Pain is subjectively much improved after receiving initial consult note recommendations. Her pain in the upright position is a 2/10. This is significantly improved from her admit symptoms two days ago.     PE:  AAOx4, in no acute distress resting comfortably in bed  NSR, WNL RR.   CTAB   5/5 strength in BLE, sensation is intact to light touch BLE.     Assessment:   Eunice is a 43 yo female with no significant PMH s/p LP (4/20/17) who presented with symptoms consistent with PDPH. She is at increased risk for PDPH as she has virtually all of the risk factors being a young female with normal BMI in which a large bore beveled (Quincke) needle was used for her procedure.     Plan   After  discussion with pt she would prefer to be discharged continuing conservative management. If her symptoms become worse upon discharge it was discussed with the pt to present to the ED where an epidural blood patch could be performed if desired. Her symptoms are improving significantly and the pt would prefer to not have the eipdural blood patch after a thorough discussion of the risks involved and the expected improvement from a PDPH. All pt questions answered and she verbalized agreement and understanding with the plan.     Upon discharge, recommend continuing current regimen as an outpatient until symptoms resolve.     Discussed with Dr. Janki Rose MD   [id number]

## 2017-04-27 NOTE — DISCHARGE SUMMARY
[facility] Hospital Medicine  Discharge Summary      Patient Name: Eunice Hickman  MRN: [id number]  Admission Date: 4/25/2017  Hospital Length of Stay: 0 days  Discharge Date and Time:  04/27/2017 1:02 PM  Attending Physician: Deann att. providers found   Discharging Provider: Maine Rg PA-C  Primary Care Provider: Abigail Roberts MD  [facility] Medicine Team: [facility] Maine Rg PA-C    HPI:   42F with no significant PMHx being admitted to observation for intractable headache s/p lumbar puncture on 4/21. The patient underwent an LP to r/o MS in setting of new onset numbness (MRI and LP unremarkable). The headache began very mildly on 4/22 but progressed to 10/10 by the next day. She describes it as a dull throbbing pain in the posterior aspect with radiation to her eyes. Her headache is worsened with postural changes (including sitting up and and sitting on toilet) and improves when supine. She has been taking naproxen, gabapentin, and tylenol at home with no relief. She endorses nausea but no vomiting, but attributes this to her new medications. She denies lightheadedness/dizziness, worsening numbness/weakness, B/B incontinence, fevers, vision changes, chest pain, shortness of breath, dysuria.    In the ED, patient received IV caffeine, fiorecet, and fluids while maintained in supine position. Neurology and anesthesia were consulted and examined the patient. PRN pain medications were ordered and aggressive conservative therapy was recommended. She voiced improvement in her symptoms with treatment in the ED.      * No surgery found *      Indwelling Lines/Drains at time of discharge:   Lines/Drains/Airways          No matching active lines, drains, or airways        Hospital Course:   Patient admitted to observation for intractable headache s/p LP on 4/21. Neurology and anesthesia were consulted and recommend aggressive conservative therapy. Started on scheduled  fioricet and PRN oxycodone per anesthesia recommendations. Given IV mag and caffeine on admission as well. Patient noted to have improvement in her symptoms and plan was made to defer epidural blood patch at this time and continue regimen at home. If symptoms do not improve, patient instructed to return to the hospital and contact anesthesia. Patient discharged home in stable condition and has close outpatient follow up with neurology.      Consults:   Consults         Status Ordering Provider     Inpatient consult to Anesthesiology  Once     Provider:  (Not yet assigned)    Completed NANDO MARTINS     Inpatient consult to Neurology  Once     Provider:  (Not yet assigned)    Completed NANDO MARTINS          Significant Diagnostic Studies: Labs:   BMP:   Recent Labs  Lab 04/25/17 1847 04/26/17 0626 04/27/17  0339   GLU 97 113* 94    139 138   K 3.4* 4.0 3.7   * 107 108   CO2 23 23 24   BUN 8 7 8   CREATININE 0.7 0.7 0.8   CALCIUM 8.3* 8.7 8.5*   MG 1.8  --  1.9   , CBC   Recent Labs  Lab 04/25/17 1847 04/26/17 0626 04/27/17  0339   WBC 9.15 7.58 6.52   HGB 12.0 11.7* 11.7*   HCT 35.3* 35.3* 34.5*    186 181    and All labs within the past 24 hours have been reviewed    Pending Diagnostic Studies:     Procedure Component Value Units Date/Time    Paraneoplastic Autoantibody Evaulation, Serum [[id number]] Collected:  04/25/17 1930    Order Status:  Sent Lab Status:  In process Updated:  04/25/17 1941    Specimen:  Blood from Blood         Final Active Diagnoses:    Diagnosis Date Noted POA    PRINCIPAL PROBLEM:  Post-dural puncture headache [G97.1] 04/25/2017 Yes    Anxiety [F41.9] 04/25/2017 Yes    Cyst of left ovary [N83.202] 04/25/2017 Yes    Numbness [R20.0] 04/20/2017 Yes      Problems Resolved During this Admission:    Diagnosis Date Noted Date Resolved POA      * Post-dural puncture headache  - S/P LP 04/21/2017 for evaluation of new onset numbness to r/o MS (MRI/LP unremarkable). Did  well after procedure, discharged 04/22, awoke 04/23 with pounding HA, worse with postural changes, improved when supine. No focal neurologic deficits on exam, numbness remains unchanged.   - Received caffeine and fluids in ED with improvement in symptoms but had later recurrence.   - MRI L spine without acute findings  - Neurology consulted and recs appreciated  - Anesthesia following  - Optimized supportive management with IVFs, IV Mag 2 grams x 1 dose, and started on fioricet q 4 hours scheduled and oxycodone 5-15 mg q 3 hours PRN pain per anesthesia recs.   - Patient noted to have improvement in symptoms overall on day of discharge  - Decision made to defer epidural blood patch at this time, if symptoms persist/worsen instructed to return to hospital and Anesthesia will re-address need for blood patch at that time.   - Patient to follow up with neurology as outpatient as previously scheduled    Numbness  - No significant changes in numbness since prior admission  - continue gabapentin PRN, continue vitamins, ALA not on formulary  - Autoimmune panel added on and pending on discharge  - Follow up Neurology as scheduled    Anxiety  - continue alprazolam PRN    Cyst of left ovary  - Incidentally found on MRI of L spine.  - Probable 5.7 cm left ovarian cyst.  Further evaluation with a pelvic ultrasound exam is recommended in 6 weeks to ensure resolution.  - Follow up OBGYN/PCP as outpatient      Discharged Condition: good    Disposition: Home or Self Care    Follow Up:  Follow-up Information     Follow up with Abigail Roberts MD.    Specialty:  Family Medicine    Contact information:    [email or web address]  [address]  [facility] [id number] [phone]          Follow up with Raghav Gutierrez III, MD.    Specialty:  Neurology    Why:  Follow up as scheduled    Contact information:    [address]  [address]  [facility] [address] [phone]          Patient Instructions:     Diet general      Activity as tolerated     Call MD for:  temperature >100.4     Call MD for:  persistent nausea and vomiting or diarrhea     Call MD for:  severe uncontrolled pain     Call MD for:  increased confusion or weakness     Call MD for:  persistent dizziness, light-headedness, or visual disturbances       Medications:  Reconciled Home Medications:   Discharge Medication List as of 4/27/2017 11:35 AM      START taking these medications    Details   butalbital-acetaminophen-caffeine -40 mg (FIORICET, ESGIC) -40 mg per tablet Take 1 tablet by mouth every 4 (four) hours. X 1 day, then every 5-6 hours x 2 days, then as needed, Starting 4/27/2017, Until Sat 5/27/17, Print      ondansetron (ZOFRAN-ODT) 4 MG TbDL Take 1 tablet (4 mg total) by mouth every 6 (six) hours as needed., Starting 4/27/2017, Until Discontinued, Print      oxycodone (ROXICODONE) 5 MG immediate release tablet Take 1 tablet (5 mg total) by mouth every 3 (three) hours as needed (Take 1 tablet (5mg) for 4-5/10 pain, 2 tablets (10mg) for 6-7/10 pain, or 3 tablets (15mg) for 8-10/10 pain every 3 hours as needed)., Starting 4/27/2017, Until Discontinued, Print         CONTINUE these medications which have NOT CHANGED    Details   alpha lipoic acid 600 mg Cap Take 1 tablet by mouth once daily., Starting 4/22/2017, Until Discontinued, OTC      CYANOCOBALAMIN, VITAMIN B-12, (VITAMIN B-12 ORAL) Take by mouth., Until Discontinued, Historical Med      gabapentin (NEURONTIN) 100 MG capsule Take 1 capsule (100 mg total) by mouth 3 (three) times daily as needed (pain)., Starting 4/22/2017, Until Sun 4/22/18, Normal      naproxen sodium (ANAPROX) 550 MG tablet TK 1 T PO BID WF, Historical Med      zolpidem (AMBIEN) 5 MG Tab TK 1 T PO QHS PRF INSOMNIA, Historical Med           Time spent on the discharge of patient: 36 minutes    Maine Rg PA-C  Department of [facility] Medicine  [facility]

## 2017-04-27 NOTE — PROGRESS NOTES
[facility]  Neurology  Progress Note    Patient Name: Eunice Hickman  MRN: [id number]  Admission Date: 4/25/2017  Hospital Length of Stay: 0 days  Code Status: Prior   Attending Provider: No att. providers found  Primary Care Physician: Abigail Roberts MD   Principal Problem:Post-dural puncture headache      Subjective:     Interval History:  No events overnight. She says her headache has improved significantly today, and was sitting up eating when I evaluated her. She says she is tolerating sitting and standing much better than before, and reports a 3-4/10 headache. She is comfortable going home with scheduled analgesia and rest, and holding off on the blood patch. She says anesthesia told her that if her headache is still interfering with her daily life by Sunday (ie. Unable to drive her children to school or otherwise care for them) that she can return to the ED and they would do a blood patch then.       No current facility-administered medications for this encounter.      Current Outpatient Prescriptions   Medication Sig Dispense Refill    alpha lipoic acid 600 mg Cap Take 1 tablet by mouth once daily. 30 each 5    CYANOCOBALAMIN, VITAMIN B-12, (VITAMIN B-12 ORAL) Take by mouth.      gabapentin (NEURONTIN) 100 MG capsule Take 1 capsule (100 mg total) by mouth 3 (three) times daily as needed (pain). 90 capsule 5    zolpidem (AMBIEN) 5 MG Tab TK 1 T PO QHS PRF INSOMNIA  0    butalbital-acetaminophen-caffeine -40 mg (FIORICET, ESGIC) -40 mg per tablet Take 1 tablet by mouth every 4 (four) hours. X 1 day, then every 5-6 hours x 2 days, then as needed 30 tablet 0    naproxen sodium (ANAPROX) 550 MG tablet TK 1 T PO BID WF  0    ondansetron (ZOFRAN-ODT) 4 MG TbDL Take 1 tablet (4 mg total) by mouth every 6 (six) hours as needed. 30 tablet 0    oxycodone (ROXICODONE) 5 MG immediate release tablet Take 1 tablet (5 mg total) by mouth every 3 (three) hours as needed (Take 1  tablet (5mg) for 4-5/10 pain, 2 tablets (10mg) for 6-7/10 pain, or 3 tablets (15mg) for 8-10/10 pain every 3 hours as needed). 30 tablet 0       Review of Systems   Cardiovascular: Negative for leg swelling.   Musculoskeletal: Positive for back pain.   Neurological: Positive for headaches. Negative for weakness.     Objective:     Vital Signs (Most Recent):  Temp: 98.7 °F (37.1 °C) (04/27/17 1100)  Pulse: 61 (04/27/17 1100)  Resp: 20 (04/27/17 1100)  BP: (!) 103/59 (04/27/17 1100)  SpO2: 100 % (04/27/17 1100) Vital Signs (24h Range):  Temp:  [96.8 °F (36 °C)-98.7 °F (37.1 °C)] 98.7 °F (37.1 °C)  Pulse:  [61-76] 61  Resp:  [20] 20  SpO2:  [96 %-100 %] 100 %  BP: ()/(50-62) 103/59     Weight: 61.2 kg (135 lb)  Body mass index is 23.17 kg/(m^2).    Physical Exam   Constitutional: She is oriented to person, place, and time. She appears well-developed and well-nourished.   Eyes: EOM are normal. Pupils are equal, round, and reactive to light.   Neurological: She is oriented to person, place, and time.   Nursing note and vitals reviewed.      NEUROLOGICAL EXAMINATION:     MENTAL STATUS   Oriented to person, place, and time.   Level of consciousness: alert    CRANIAL NERVES     CN III, IV, VI   Pupils are equal, round, and reactive to light.  Extraocular motions are normal.     CN V   Facial sensation intact.     CN VII   Facial expression full, symmetric.     MOTOR EXAM   Muscle bulk: normal  Overall muscle tone: normal      Significant Labs:   CBC:     Recent Labs  Lab 04/25/17  1847 04/26/17  0626 04/27/17  0339   WBC 9.15 7.58 6.52   HGB 12.0 11.7* 11.7*   HCT 35.3* 35.3* 34.5*    186 181     CMP:     Recent Labs  Lab 04/25/17  1847 04/26/17  0626 04/27/17  0339   GLU 97 113* 94    139 138   K 3.4* 4.0 3.7   * 107 108   CO2 23 23 24   BUN 8 7 8   CREATININE 0.7 0.7 0.8   CALCIUM 8.3* 8.7 8.5*   MG 1.8  --  1.9   ANIONGAP 7* 9 6*   EGFRNONAA >60.0 >60.0 >60.0       Significant Imaging: I have  reviewed all pertinent imaging results/findings within the past 24 hours.    Assessment and Plan:     * Post-dural puncture headache  Mrs. Hickman is a 42 year old woman who is admitted for a post-LP headache. She had some mild, initial relief with IV caffiene and fluids, with minimal relief from PRNs. She was not receiving scheduled fioricet, and had about 12 hours without any analgesics. Anesthesia has been consulted and have provided recommendations for conservative management, and will reevaluate to determine if she will be a candidate for a blood patch should conservative measures not work.     Recommendations:  -- Continue fioricet and oxycodone (as tolerated) per anesthesia recs (appreciated)  -- OK for discharge from Neurology perspective  -- If headache persists/worsens by Sunday, patient will return to ED for blood patch by anesthesia      VTE Risk Mitigation         Ordered     Medium Risk of VTE  Once      04/25/17 9188          Anika Foster MD  Neurology  [facility]

## 2017-04-27 NOTE — SUBJECTIVE & OBJECTIVE
Subjective:     Interval History:  No events overnight. She says her headache has improved significantly today, and was sitting up eating when I evaluated her. She says she is tolerating sitting and standing much better than before, and reports a 3-4/10 headache. She is comfortable going home with scheduled analgesia and rest, and holding off on the blood patch. She says anesthesia told her that if her headache is still interfering with her daily life by Sunday (ie. Unable to drive her children to school or otherwise care for them) that she can return to the ED and they would do a blood patch then.       No current facility-administered medications for this encounter.      Current Outpatient Prescriptions   Medication Sig Dispense Refill    alpha lipoic acid 600 mg Cap Take 1 tablet by mouth once daily. 30 each 5    CYANOCOBALAMIN, VITAMIN B-12, (VITAMIN B-12 ORAL) Take by mouth.      gabapentin (NEURONTIN) 100 MG capsule Take 1 capsule (100 mg total) by mouth 3 (three) times daily as needed (pain). 90 capsule 5    zolpidem (AMBIEN) 5 MG Tab TK 1 T PO QHS PRF INSOMNIA  0    butalbital-acetaminophen-caffeine -40 mg (FIORICET, ESGIC) -40 mg per tablet Take 1 tablet by mouth every 4 (four) hours. X 1 day, then every 5-6 hours x 2 days, then as needed 30 tablet 0    naproxen sodium (ANAPROX) 550 MG tablet TK 1 T PO BID WF  0    ondansetron (ZOFRAN-ODT) 4 MG TbDL Take 1 tablet (4 mg total) by mouth every 6 (six) hours as needed. 30 tablet 0    oxycodone (ROXICODONE) 5 MG immediate release tablet Take 1 tablet (5 mg total) by mouth every 3 (three) hours as needed (Take 1 tablet (5mg) for 4-5/10 pain, 2 tablets (10mg) for 6-7/10 pain, or 3 tablets (15mg) for 8-10/10 pain every 3 hours as needed). 30 tablet 0       Review of Systems   Cardiovascular: Negative for leg swelling.   Musculoskeletal: Positive for back pain.   Neurological: Positive for headaches. Negative for weakness.     Objective:      Vital Signs (Most Recent):  Temp: 98.7 °F (37.1 °C) (04/27/17 1100)  Pulse: 61 (04/27/17 1100)  Resp: 20 (04/27/17 1100)  BP: (!) 103/59 (04/27/17 1100)  SpO2: 100 % (04/27/17 1100) Vital Signs (24h Range):  Temp:  [96.8 °F (36 °C)-98.7 °F (37.1 °C)] 98.7 °F (37.1 °C)  Pulse:  [61-76] 61  Resp:  [20] 20  SpO2:  [96 %-100 %] 100 %  BP: ()/(50-62) 103/59     Weight: 61.2 kg (135 lb)  Body mass index is 23.17 kg/(m^2).    Physical Exam   Constitutional: She is oriented to person, place, and time. She appears well-developed and well-nourished.   Eyes: EOM are normal. Pupils are equal, round, and reactive to light.   Neurological: She is oriented to person, place, and time.   Nursing note and vitals reviewed.      NEUROLOGICAL EXAMINATION:     MENTAL STATUS   Oriented to person, place, and time.   Level of consciousness: alert    CRANIAL NERVES     CN III, IV, VI   Pupils are equal, round, and reactive to light.  Extraocular motions are normal.     CN V   Facial sensation intact.     CN VII   Facial expression full, symmetric.     MOTOR EXAM   Muscle bulk: normal  Overall muscle tone: normal      Significant Labs:   CBC:     Recent Labs  Lab 04/25/17 1847 04/26/17  0626 04/27/17  0339   WBC 9.15 7.58 6.52   HGB 12.0 11.7* 11.7*   HCT 35.3* 35.3* 34.5*    186 181     CMP:     Recent Labs  Lab 04/25/17  1847 04/26/17  0626 04/27/17  0339   GLU 97 113* 94    139 138   K 3.4* 4.0 3.7   * 107 108   CO2 23 23 24   BUN 8 7 8   CREATININE 0.7 0.7 0.8   CALCIUM 8.3* 8.7 8.5*   MG 1.8  --  1.9   ANIONGAP 7* 9 6*   EGFRNONAA >60.0 >60.0 >60.0       Significant Imaging: I have reviewed all pertinent imaging results/findings within the past 24 hours.

## 2017-04-27 NOTE — ASSESSMENT & PLAN NOTE
Mrs. Hickman is a 42 year old woman who is admitted for a post-LP headache. She had some mild, initial relief with IV caffiene and fluids, with minimal relief from PRNs. She was not receiving scheduled fioricet, and had about 12 hours without any analgesics. Anesthesia has been consulted and have provided recommendations for conservative management, and will reevaluate to determine if she will be a candidate for a blood patch should conservative measures not work.     Recommendations:  -- Continue fioricet and oxycodone (as tolerated) per anesthesia recs (appreciated)  -- OK for discharge from Neurology perspective  -- If headache persists/worsens by Sunday, patient will return to ED for blood patch by anesthesia

## 2017-04-27 NOTE — ASSESSMENT & PLAN NOTE
- No significant changes in numbness since prior admission  - continue gabapentin PRN, continue vitamins, ALA not on formulary  - Autoimmune panel added on and pending on discharge  - Follow up Neurology as scheduled

## 2017-04-27 NOTE — ASSESSMENT & PLAN NOTE
- Incidentally found on MRI of L spine.  - Probable 5.7 cm left ovarian cyst.  Further evaluation with a pelvic ultrasound exam is recommended in 6 weeks to ensure resolution.  - Follow up OBGYN/PCP as outpatient

## 2017-04-27 NOTE — TELEPHONE ENCOUNTER
Reason for Disposition   Caller has URGENT medication question about med that PCP prescribed and triager unable to answer question    Answer Assessment - Initial Assessment Questions  Mother calling for pt who was discharged today. Had been hospitalized for headache post spinal tab. She reported instead of doing a blood patch they are doing what they call the conservative tx. She has taken zofran at 1600 for nausea and is due to take roxicodone but is afraid to take it due to the nausea- dosing on med is related to her pain level which is increasing. Mother stated they had advised she take the medication to keep on top of the pain. They want to know if there is something else they can do or another med to take to help with sx's.    Protocols used: ST MEDICATION QUESTION CALL-A-    Referred to on call for Dr Wilkerson by

## 2017-04-27 NOTE — PLAN OF CARE
Problem: Patient Care Overview  Goal: Plan of Care Review  Outcome: Ongoing (interventions implemented as appropriate)  POC reviewed with the patient. Verbalized understanding. Fall precautions and safety maintained. No falls/injury this shift. No new skin impairments noted. AAOx4. Afebrile. VSS. HOB flat. Required assistance with pain management overnight, PRN medication given. IVF @ 100 ml/hr continued. Pt progressing toward goals. No cognitive/physical changes noted overnight. Will continue to monitor.

## 2017-04-27 NOTE — ASSESSMENT & PLAN NOTE
- S/P LP 04/21/2017 for evaluation of new onset numbness to r/o MS (MRI/LP unremarkable). Did well after procedure, discharged 04/22, awoke 04/23 with pounding HA, worse with postural changes, improved when supine. No focal neurologic deficits on exam, numbness remains unchanged.   - Received caffeine and fluids in ED with improvement in symptoms but had later recurrence.   - MRI L spine without acute findings  - Neurology consulted and recs appreciated  - Anesthesia following  - Optimized supportive management with IVFs, IV Mag 2 grams x 1 dose, and started on fioricet q 4 hours scheduled and oxycodone 5-15 mg q 3 hours PRN pain per anesthesia recs.   - Patient noted to have improvement in symptoms overall on day of discharge  - Decision made to defer epidural blood patch at this time, if symptoms persist/worsen instructed to return to hospital and Anesthesia will re-address need for blood patch at that time.   - Patient to follow up with neurology as outpatient as previously scheduled

## 2017-04-28 ENCOUNTER — TELEPHONE (OUTPATIENT)
Dept: NEUROLOGY | Facility: CLINIC | Age: 42
End: 2017-04-28

## 2017-04-28 DIAGNOSIS — M79.2 NEUROPATHIC PAIN: Primary | ICD-10-CM

## 2017-04-28 NOTE — TELEPHONE ENCOUNTER
----- Message from Ruth Todd sent at 4/28/2017  9:57 AM CDT -----  Contact: Patient [phone]  Patient is calling to speak with nurse to schedule a follow up visit and an EMG test, and to request that her lab results be sent to her OBGYN at fax [phone] Dr. Justice office.

## 2017-05-01 ENCOUNTER — INITIAL CONSULT (OUTPATIENT)
Dept: RHEUMATOLOGY | Facility: CLINIC | Age: 42
End: 2017-05-01
Payer: COMMERCIAL

## 2017-05-01 VITALS
WEIGHT: 134.69 LBS | TEMPERATURE: 99 F | BODY MASS INDEX: 23.86 KG/M2 | HEIGHT: 63 IN | HEART RATE: 77 BPM | DIASTOLIC BLOOD PRESSURE: 69 MMHG | SYSTOLIC BLOOD PRESSURE: 101 MMHG

## 2017-05-01 DIAGNOSIS — M79.642 PAIN IN BOTH HANDS: ICD-10-CM

## 2017-05-01 DIAGNOSIS — Z83.49 FAMILY HISTORY OF HEMOCHROMATOSIS: ICD-10-CM

## 2017-05-01 DIAGNOSIS — M25.522 PAIN OF BOTH ELBOWS: ICD-10-CM

## 2017-05-01 DIAGNOSIS — M25.562 ACUTE PAIN OF BOTH KNEES: ICD-10-CM

## 2017-05-01 DIAGNOSIS — M25.561 ACUTE PAIN OF BOTH KNEES: ICD-10-CM

## 2017-05-01 DIAGNOSIS — M25.521 PAIN OF BOTH ELBOWS: ICD-10-CM

## 2017-05-01 DIAGNOSIS — M79.641 PAIN IN BOTH HANDS: ICD-10-CM

## 2017-05-01 DIAGNOSIS — R53.82 CHRONIC FATIGUE: ICD-10-CM

## 2017-05-01 LAB — CRYOGLOB SER QL: NORMAL

## 2017-05-01 PROCEDURE — 1160F RVW MEDS BY RX/DR IN RCRD: CPT | Mod: S$GLB,,, | Performed by: INTERNAL MEDICINE

## 2017-05-01 PROCEDURE — 99204 OFFICE O/P NEW MOD 45 MIN: CPT | Mod: S$GLB,,, | Performed by: INTERNAL MEDICINE

## 2017-05-01 PROCEDURE — 99999 PR PBB SHADOW E&M-EST. PATIENT-LVL III: CPT | Mod: PBBFAC,,, | Performed by: INTERNAL MEDICINE

## 2017-05-01 NOTE — LETTER
May 1, 2017      Quiana Kenny PA-C  [address] [facility] [address]           [facility]  [address]  [place] [address]  Phone: [phone]  Fax: [phone]          Patient: Eunice Hickman   MR Number: [id number]   YOB: 1975   Date of Visit: 5/1/2017       Dear Quiana Kenny:    Thank you for referring Eunice Hickman to me for evaluation. Attached you will find relevant portions of my assessment and plan of care.    If you have questions, please do not hesitate to call me. I look forward to following Eunice Hickman along with you.    Sincerely,    Maria De Jesus Ames MD    Enclosure  CC:  No Recipients    If you would like to receive this communication electronically, please contact [email or web address] or [phone] to request more information on [email or web address] Link access.    For providers and/or their staff who would like to refer a patient to [facility], please contact us through our one-stop-shop provider referral line, [facility], at [phone].    If you feel you have received this communication in error or would no longer like to receive these types of communications, please e-mail [email or web address]

## 2017-05-01 NOTE — PROGRESS NOTES
Subjective:       Patient ID: Eunice Hickman is a 42 y.o. female.    Chief Complaint: Joint Pain      HPI:  Eunice Hickman is a 42 y.o. female reports week after Rich Lentz went skiing had severe foot pain b/l.  Got bigger boots but still had pain.  Returned to [place] and toes and top of feet were numb.  No discoloration of hand or feet  Fourth and fifth fingers bilaterally that improved with 6 days of steroid from primary doctor but returned when finished.    Right elbow pain (now both involved).  Primary doctor did labs.  B12 was low.  GRUPO was positive.  Recommended she see neurologist and rheumatologist.  Dr. Koch diagnosed acute sensory neuropathy.  Repeat GRUPO was negative.  He hospitalized her for 2 nights and did MRI of brain down to lumbar spine, lumbar puncture and labs.  EMG by another neurologist was mildly abnormal but is scheduled to do another study.     She also saw [facility] Dr. Kesha Maya and work up was negative.    Anxiety issues worsens with this issues.  Irregular menses.  No possibility of pregnancy since had tubes tied.      She notes being more sore with usual activity of playing tennis 2 x a week.  She works part time as CPA.    Lupus Review of Systems  Alopecia: no  Photosensitivity: no   Raynaud's: no  Oral or nasal ulcers: no  Rashes: no (redness on legs resolved overnight a couple of night ago)  No pleurisy or pericarditis.  No seizures, psychosis, or stroke.  No venous or arterial clots.  Pregnancy hx (if applicable): 2 miscarriages (5 total pregnancies; miscarriage 6-7 weeks before 1st successful pregnancy and 6-7 weeks after 1st pregnancy)      Middle child has ceptooptic dysplasia (brain did not develop correctly).  She has seizures and developmental delay.     Review of Systems   Constitutional: Negative for fever and unexpected weight change.   HENT: Negative for trouble swallowing.    Eyes: Negative for redness.   Respiratory: Positive for cough and  "shortness of breath.    Cardiovascular: Negative for chest pain.   Gastrointestinal: Negative for constipation and diarrhea.   Endocrine: Negative.    Genitourinary: Negative for dysuria and genital sores.   Musculoskeletal: Positive for arthralgias.   Skin: Positive for rash.   Allergic/Immunologic: Negative.    Neurological: Positive for headaches.   Hematological: Does not bruise/bleed easily.   Psychiatric/Behavioral: The patient is nervous/anxious.          Objective:   /69 (BP Location: Right arm, Patient Position: Sitting, BP Method: Automatic)  Pulse 77  Temp 98.7 °F (37.1 °C) (Oral)   Ht 5' 3" (1.6 m)  Wt 61.1 kg (134 lb 11.2 oz)  LMP 04/06/2017 (Approximate)  BMI 23.86 kg/m2     Physical Exam   Constitutional: She is oriented to person, place, and time and well-developed, well-nourished, and in no distress.   HENT:   Head: Normocephalic and atraumatic.   Eyes: Conjunctivae and EOM are normal.   Neck: Neck supple.   Cardiovascular: Normal rate, regular rhythm and normal heart sounds.    Pulmonary/Chest: Effort normal and breath sounds normal.   Abdominal: Soft. Bowel sounds are normal.   Neurological: She is alert and oriented to person, place, and time. She has normal reflexes. Gait normal.   Pain in lumbar spine with forward flexion of 45 degrees (chronic)   Skin: Skin is warm and dry.     Psychiatric: Mood and affect normal.   Musculoskeletal: She exhibits no edema, tenderness or deformity.   28 joint count: 0 swollen and 0 tender  6/18 FM points painful           LABS    Component      Latest Ref Rng & Units 4/27/2017 4/26/2017 4/25/2017   WBC      3.90 - 12.70 K/uL 6.52 7.58 9.15   RBC      4.00 - 5.40 M/uL 3.84 (L) 3.93 (L) 3.97 (L)   Hemoglobin      12.0 - 16.0 g/dL 11.7 (L) 11.7 (L) 12.0   Hematocrit      37.0 - 48.5 % 34.5 (L) 35.3 (L) 35.3 (L)   MCV      82 - 98 fL 90 90 89   MCH      27.0 - 31.0 pg 30.5 29.8 30.2   MCHC      32.0 - 36.0 % 33.9 33.1 34.0   RDW      11.5 - 14.5 % 12.8 " 12.5 12.5   Platelets      150 - 350 K/uL 181 186 177   MPV      9.2 - 12.9 fL 9.0 (L) 9.2 9.1 (L)   Gran #      1.8 - 7.7 K/uL 3.3 4.1 5.5   Lymph #      1.0 - 4.8 K/uL 2.3 2.1 2.6   Mono #      0.3 - 1.0 K/uL 0.3 0.5 0.5   Eos #      0.0 - 0.5 K/uL 0.6 (H) 0.8 (H) 0.5   Baso #      0.00 - 0.20 K/uL 0.02 0.02 0.02   Gran%      38.0 - 73.0 % 50.7 54.5 59.6   Lymph%      18.0 - 48.0 % 34.7 28.2 28.9   Mono%      4.0 - 15.0 % 5.2 6.3 5.2   Eosinophil%      0.0 - 8.0 % 8.9 (H) 10.6 (H) 5.9   Basophil%      0.0 - 1.9 % 0.3 0.3 0.2   Differential Method       Automated Automated Automated   Sodium      136 - 145 mmol/L 138 139 141   Potassium      3.5 - 5.1 mmol/L 3.7 4.0 3.4 (L)   Chloride      95 - 110 mmol/L 108 107 111 (H)   CO2      23 - 29 mmol/L 24 23 23   Glucose      70 - 110 mg/dL 94 113 (H) 97   BUN, Bld      6 - 20 mg/dL 8 7 8   Creatinine      0.5 - 1.4 mg/dL 0.8 0.7 0.7   Calcium      8.7 - 10.5 mg/dL 8.5 (L) 8.7 8.3 (L)   Anion Gap      8 - 16 mmol/L 6 (L) 9 7 (L)   eGFR if African American      >60 mL/min/1.73 m:2 >60.0 >60.0 >60.0   eGFR if non African American      >60 mL/min/1.73 m:2 >60.0 >60.0 >60.0   Magnesium      1.6 - 2.6 mg/dL 1.9  1.8   Phosphorus      2.7 - 4.5 mg/dL   2.8     Component      Latest Ref Rng & Units 4/21/2017 4/4/2017   Arsenic      0 - 12 ng/mL <1    Lead      0.0 - 4.9 mcg/dL <1.0    Cadmium      0.0 - 4.9 ng/mL <0.2    Mercury      0 - 9 ng/mL 2    Venous/Capillary       MITCH    Street Address       Test Not Performed    City       Test Not Performed    State       Test Not Performed    [facility]       Test Not Performed    [facility]       Test Not Performed    Guardian First Name       Test Not Performed    Guardian Last Name       Test Not Performed    Home Phone       Test Not Performed    Race       Test Not Performed    CSF Bands      bands 0    Olig Bands Interpretation, CSF      <4 bands 0    Serum Bands      bands 0    IgG Index, CSF      <=0.85 0.42    IgG, CSF      <=8.1  mg/dL 0.9    Albumin, CSF      <=27.0 mg/dL 9.1    IGG/ALBUMIN RATIO, CSF      <=0.21 0.10    IgG Synthetic Rate      <=12 mg/24 h 0.00    IgG,S      767 - 1590 mg/dL 929    Albumin, Serum      3200 - 4800 mg/dL 3870    IgG/Albumin, S      <=0.40 0.24    Protein, Serum      6.0 - 8.4 g/dL  6.8   Albumin grams/dl      3.35 - 5.55 g/dL  4.16   Alpha-1 grams/dl      0.17 - 0.41 g/dL  0.26   Alpha-2 grams/dl      0.43 - 0.99 g/dL  0.64   Beta grams/dl      0.50 - 1.10 g/dL  0.64   Gamma grams/dl      0.67 - 1.58 g/dL  1.10   Antigliadin Abs, IgA      <20 UNITS  6   Antigliadin Ab IgG      <20 UNITS  3   TTG IgA      <20 UNITS  5   TTG IgG      <20 UNITS  3   Immunoglobulin A (IgA)      70 - 400 mg/dL  205   LYME AB IGG BY WB:      Negative Negative    Lyme IgG Bands      kDa No bands detected    Lyme Ab IgM by WB:      Negative Negative    Lyme IgM Bands      kDa p41,    Lyme Disease Ab Interpretation       SEE BELOW    Anti-SSA Antibody      0.00 - 19.99 EU  1.03   Anti-SSA Interpretation      Negative  Negative   Anti-SSB Antibody      0.00 - 19.99 EU  0.10   Anti-SSB Interpretation      Negative  Negative   Cytoplasmic Neutrophilic Ab      <1:20 Titer  <1:20   Perinuclear (P-ANCA)      <1:20 Titer  <1:20   Hemoglobin A1C      4.5 - 6.2 % 5.3    Estimated Avg Glucose      68 - 131 mg/dL 105    Vitamin B-12      210 - 950 pg/mL  1025 (H)   RPR      Non-reactive  Non-reactive   Thiamine      38 - 122 ug/L  41   Vitamin B6      5 - 50 ug/L  9   Vit D, 25-Hydroxy      30 - 96 ng/mL  35   Angio Convert Enzyme      8 - 53 U/L  16   GRUPO Screen      Negative <1:160  Negative <1:160   Methlymalonic Acid      <0.40 umol/L  <0.10   Homocysteine      4.0 - 15.5 umol/L  4.2   ds DNA Ab      Negative 1:10  Negative 1:10   Lyme Ab      <0.90 Index Value   0.16    Sed Rate      0 - 20 mm/Hr 5    CRP, High Sensitivity      0.00 - 3.19 mg/L 0.35    Lyme Disease Serology, CSF      Negative Negative    Angio Convert Enzyme, CSF      0.0  - 2.5 U/L 1.1         Assessment:       1.  Joint pain  2.  Sensory neuropathy  3.  Positive GRUOP.  Now negative  4.  Fatigue  5.  Family history of hemochromatosis  6.  Anxiety.  Will start seeing a therapist.  Currently takes Xanax  Plan:       1. Two months of symptoms.  Extensive work up negative.  GRUPO now negative.  Concern for fibromyalgia raised by patient but would like to monitor further before making that diagnosis.  Patient also with hormonal issues and will see gyn.  2. 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   3. Proceed with counseling for stress.  RTO in 3-4 months/prn.

## 2017-05-01 NOTE — MR AVS SNAPSHOT
[facility]  [address]  [facility] [address]  Phone: [phone]  Fax: [phone]                  Eunice MORENO Marylou   2017 1:00 PM   Initial consult    Description:  Female : 1975   Provider:  Maria De Jesus Ames MD   Department:  [facility] - Rheumatology           Reason for Visit     Joint Pain           Diagnoses this Visit        Comments    Pain in both hands         Pain of both elbows         Acute pain of both knees         Chronic fatigue         Family history of hemochromatosis                To Do List           Future Appointments        Provider Department Dept Phone    2017 2:30 PM LAB, APPOINTMENT [place] [facility] [phone]      Goals (5 Years of Data)     None      Follow-Up and Disposition     Return in about 3 months (around 2017).    Follow-up and Disposition History      [facility]     [facility] Nurse Care Line -  Assistance  Unless otherwise directed by your provider, please contact [facility], our nurse care line that is available for  assistance.     Registered nurses in the [facility] provide: appointment scheduling, clinical advisement, health education, and other advisory services.  Call: [phone] (toll free)               Medications           Message regarding Medications     Verify the changes and/or additions to your medication regime listed below are the same as discussed with your clinician today.  If any of these changes or additions are incorrect, please notify your healthcare provider.             Verify that the below list of medications is an accurate representation of the medications you are currently taking.  If none reported, the list may be blank. If incorrect, please contact your healthcare provider. Carry this list with you in case of emergency.           Current Medications     alpha lipoic acid 600 mg Cap Take 1 tablet by mouth once daily.     "butalbital-acetaminophen-caffeine -40 mg (FIORICET, ESGIC) -40 mg per tablet Take 1 tablet by mouth every 4 (four) hours. X 1 day, then every 5-6 hours x 2 days, then as needed    CYANOCOBALAMIN, VITAMIN B-12, (VITAMIN B-12 ORAL) Take by mouth.    gabapentin (NEURONTIN) 100 MG capsule Take 1 capsule (100 mg total) by mouth 3 (three) times daily as needed (pain).    naproxen sodium (ANAPROX) 550 MG tablet TK 1 T PO BID WF    ondansetron (ZOFRAN-ODT) 4 MG TbDL Take 1 tablet (4 mg total) by mouth every 6 (six) hours as needed.    oxycodone (ROXICODONE) 5 MG immediate release tablet Take 1 tablet (5 mg total) by mouth every 3 (three) hours as needed (Take 1 tablet (5mg) for 4-5/10 pain, 2 tablets (10mg) for 6-7/10 pain, or 3 tablets (15mg) for 8-10/10 pain every 3 hours as needed).    zolpidem (AMBIEN) 5 MG Tab TK 1 T PO QHS PRF INSOMNIA           Clinical Reference Information           Your Vitals Were     BP Pulse Temp Height    101/69 (BP Location: Right arm, Patient Position: Sitting, BP Method: Automatic) 77 98.7 °F (37.1 °C) (Oral) 5' 3" (1.6 m)    Weight Last Period BMI    61.1 kg (134 lb 11.2 oz) 04/06/2017 (Approximate) 23.86 kg/m2      Blood Pressure          Most Recent Value    BP  101/69      Allergies as of 5/1/2017     No Known Allergies      Immunizations Administered on Date of Encounter - 5/1/2017     None      Orders Placed During Today's Visit     Future Labs/Procedures Expected by Expires    Miscellaneous Sendout Test Blood  5/1/2017 6/30/2018      Language Assistance Services     ATTENTION: Language assistance services are available, free of charge. Please call [phone].      ATENCIÓN: Si nellala darron, tiene a hernandez disposición servicios gratuitos de asistencia lingüística. Llame al [phone].     CHÚ Ý: N?u b?n nói Ti?ng Vi?t, có các d?ch v? h? tr? ngôn ng? mi?n phí dành cho b?n. G?i s? [phone].         [facility] complies with applicable Federal civil " rights laws and does not discriminate on the basis of race, color, national origin, age, disability, or sex.

## 2017-05-03 ENCOUNTER — OFFICE VISIT (OUTPATIENT)
Dept: NEUROLOGY | Facility: CLINIC | Age: 42
End: 2017-05-03
Payer: COMMERCIAL

## 2017-05-03 VITALS
SYSTOLIC BLOOD PRESSURE: 113 MMHG | BODY MASS INDEX: 23.14 KG/M2 | WEIGHT: 135.56 LBS | DIASTOLIC BLOOD PRESSURE: 72 MMHG | HEART RATE: 91 BPM | HEIGHT: 64 IN

## 2017-05-03 DIAGNOSIS — M25.50 ARTHRALGIA, UNSPECIFIED JOINT: ICD-10-CM

## 2017-05-03 DIAGNOSIS — F41.9 ANXIETY: ICD-10-CM

## 2017-05-03 DIAGNOSIS — R20.2 NUMBNESS AND TINGLING: Primary | ICD-10-CM

## 2017-05-03 DIAGNOSIS — R20.0 NUMBNESS AND TINGLING: Primary | ICD-10-CM

## 2017-05-03 LAB
ACHR BIND AB SER-SCNC: 0 NMOL/L
AMPHIPHYSIN AB TITR SER: NEGATIVE TITER
CV2 IGG TITR SER: NEGATIVE TITER
GLIAL NUC TYPE 1 AB TITR SER: NEGATIVE TITER
HU1 AB TITR SER: NEGATIVE TITER
HU2 AB TITR SER IF: NEGATIVE TITER
HU3 AB TITR SER: NEGATIVE TITER
IMMUNOLOGIST REVIEW: NORMAL
NACHR AB SER-SCNC: 0 NMOL/L
PAVAL REFLEX TEST ADDED: NORMAL
PCA-1 AB TITR SER: NEGATIVE TITER
PCA-2 AB TITR SER: NEGATIVE TITER
PCA-TR AB TITR SER: NEGATIVE TITER
STRIA MUS AB TITR SER: NEGATIVE TITER
VGCC-N BIND AB SER-SCNC: 0 NMOL/L
VGCC-P/Q BIND AB SER-SCNC: 0 NMOL/L
VGKC AB SER-SCNC: 0 NMOL/L

## 2017-05-03 PROCEDURE — 1160F RVW MEDS BY RX/DR IN RCRD: CPT | Mod: S$GLB,,, | Performed by: PHYSICIAN ASSISTANT

## 2017-05-03 PROCEDURE — 99214 OFFICE O/P EST MOD 30 MIN: CPT | Mod: S$GLB,,, | Performed by: PHYSICIAN ASSISTANT

## 2017-05-03 PROCEDURE — 99999 PR PBB SHADOW E&M-EST. PATIENT-LVL III: CPT | Mod: PBBFAC,,, | Performed by: PHYSICIAN ASSISTANT

## 2017-05-03 RX ORDER — ALPRAZOLAM 0.25 MG/1
0.25 TABLET ORAL 2 TIMES DAILY PRN
COMMUNITY

## 2017-05-03 RX ORDER — CLONAZEPAM 0.5 MG/1
0.5 TABLET ORAL DAILY
COMMUNITY
End: 2023-09-20 | Stop reason: ALTCHOICE

## 2017-05-03 NOTE — LETTER
May 8, 2017      Harley Koch MD  [address]  [facility] [address]           [facility]  [address]  [facility] [address]  Phone: [phone]  Fax: [phone]          Patient: Eunice Hickman   MR Number: [id number]   YOB: 1975   Date of Visit: 5/3/2017       Dear Dr. Harley Koch:    Thank you for referring Eunice Hickman to me for evaluation. Attached you will find relevant portions of my assessment and plan of care.    If you have questions, please do not hesitate to call me. I look forward to following Eunice Hickman along with you.    Sincerely,    Patrica Merida PA-C    Enclosure  CC:  No Recipients    If you would like to receive this communication electronically, please contact [email or web address] or [phone] to request more information on [facility] Link access.    For providers and/or their staff who would like to refer a patient to [facility], please contact us through our one-stop-shop provider referral line, [facility], at [phone].    If you feel you have received this communication in error or would no longer like to receive these types of communications, please e-mail [email or web address]

## 2017-05-03 NOTE — PROGRESS NOTES
"[facility], Department of Neurology   [facility] [place] [address]  Phone:[phone]  Fax: [phone]    Patient Name: Eunice Hickman  : 1975  MRN:  [id number]    2017     chief complaint: pain    PCP: Abigail Roberts MD.    HPI 5/3/17:  Eunice Hickman is a 43 yo female with anxiety referred by Dr. Koch.    Onset of pain in toes during [place] trip [email or web address]. Got larger ski boots, helped a little initially. However, next couple days of skiing had increased pain in toes, especially towards the end of the day. When she returned from the trip, she noted numbness of toes and tingling of fingers. Numbness and tingling in distal upper and lower extremities is constant. She has aching pain of bl feet with prolonged walking.     She also notes joint pain of elbows, wrists, and occasionally knees. Elbow pain has awoken her from sleep.     Looking back, notes she did have increased joint pain prior to ski trip.     Denies autonomic symptoms.     Pt has has MRI brain, cervical, thoracic, lumbar spine which was unrevealing. Also had LP- unremarkable. Outside EMG with reportedly "small abnormality."    Medications:   Current Outpatient Prescriptions   Medication Sig Dispense Refill    alpha lipoic acid 600 mg Cap Take 1 tablet by mouth once daily. 30 each 5    alprazolam (XANAX) 0.25 MG tablet Take 0.25 mg by mouth 2 (two) times daily as needed for Anxiety.      butalbital-acetaminophen-caffeine -40 mg (FIORICET, ESGIC) -40 mg per tablet Take 1 tablet by mouth every 4 (four) hours. X 1 day, then every 5-6 hours x 2 days, then as needed 30 tablet 0    CYANOCOBALAMIN, VITAMIN B-12, (VITAMIN B-12 ORAL) Take by mouth.      gabapentin (NEURONTIN) 100 MG capsule Take 1 capsule (100 mg total) by mouth 3 (three) times daily as needed (pain). 90 capsule 5    naproxen sodium (ANAPROX) 550 MG tablet TK 1 T PO BID WF  0    zolpidem (AMBIEN) 5 MG Tab " TK 1 T PO QHS PRF INSOMNIA  0    clonazePAM (KLONOPIN) 0.5 MG tablet Take 0.5 mg by mouth once daily.      ondansetron (ZOFRAN-ODT) 4 MG TbDL Take 1 tablet (4 mg total) by mouth every 6 (six) hours as needed. 30 tablet 0    oxycodone (ROXICODONE) 5 MG immediate release tablet Take 1 tablet (5 mg total) by mouth every 3 (three) hours as needed (Take 1 tablet (5mg) for 4-5/10 pain, 2 tablets (10mg) for 6-7/10 pain, or 3 tablets (15mg) for 8-10/10 pain every 3 hours as needed). 30 tablet 0     No current facility-administered medications for this visit.        Allergies:  Review of patient's allergies indicates:  No Known Allergies    PMHx:  Past Medical History:   Diagnosis Date    Anxiety 2017     Past Surgical History:   Procedure Laterality Date     SECTION      DILATION AND CURETTAGE OF UTERUS      HERNIA REPAIR         Fhx:  Family History   Problem Relation Age of Onset    No Known Problems Mother     No Known Problems Father     No Known Problems Sister     No Known Problems Brother     No Known Problems Maternal Aunt     No Known Problems Maternal Uncle     No Known Problems Paternal Aunt     No Known Problems Paternal Uncle     No Known Problems Maternal Grandmother     No Known Problems Maternal Grandfather     No Known Problems Paternal Grandmother     No Known Problems Paternal Grandfather     Amblyopia Neg Hx     Blindness Neg Hx     Cancer Neg Hx     Cataracts Neg Hx     Diabetes Neg Hx     Glaucoma Neg Hx     Hypertension Neg Hx     Macular degeneration Neg Hx     Retinal detachment Neg Hx     Strabismus Neg Hx     Stroke Neg Hx     Thyroid disease Neg Hx        Shx:   Social History     Social History    Marital status:      Spouse name: N/A    Number of children: N/A    Years of education: N/A     Occupational History    Not on file.     Social History Main Topics    Smoking status: Never Smoker    Smokeless tobacco: Never Used    Alcohol use  "Yes      Comment: occasionally    Drug use: No    Sexual activity: Yes     Partners: Male     Other Topics Concern    Not on file     Social History Narrative       ROS:  Review of Systems (Questions asked; positives or additions noted in BOLD)  Gen Malaise/fatigue, weight change   HEENT Hearing loss, blurred vision, diplopia   Card CP, palpitations   Pulm SOB, cough    Vasc Easy bruising, easy bleeding    GI Nausea, vomiting, constipation    Frequency, urgency   M/S Joint pain, myalgias, falls    Endocrine Temperature intolerance, polyuria, polydipsia   Neuro Dizziness, tremors, seizures, focal weakness, Others- as noted in HPI   Psych Memory loss, depression, anxiety, hallucinations     PHYSICAL EXAM:   /72  Pulse 91  Ht 5' 4" (1.626 m)  Wt 61.5 kg (135 lb 9.3 oz)  LMP 04/06/2017 (Approximate)  BMI 23.27 kg/m2   GEN:  NAD, well-developed, well-groomed.  HEENT: No cervical lymphadenopathy noted.  CARDIOVASCULAR: Radial pulses 2+ bilaterally. No LE edema noted.  NEURO:  Mental Status: Awake, alert, and oriented. Normal attention and concentration.  Speech is fluent and appropriate language function.    Cranial Nerves:  II Pupils are round and reactive to direct and consensual light and accommodation. Visual fields full to confrontation.   III, IV, VI Extraocular eye movements full bilaterally. No ptosis noted.   V Normal facial sensation to pinprick and light touch.   VII Symmetric facial expressions.   VIII Hearing intact to finger rub bilaterally.   IX, X Palate elevates midline and symmetrically.   XI Shoulder elevation is symmetric and full strength bilaterally. Neck rotation strength is normal bilaterally.   XII Tongue is midline.     Sensory: Sensation is normal to pinprick, vibration and proprioception in the upper and lower extremities bilaterally. Romberg is Negative.    Motor: Extremities demonstrate normal bulk and tone in all four limbs. No atrophy or fasciculations noted. No pronator " drift.   Strength-       RUE: 5/5                LUE: 5/5                RLE: 5/5                LLE: 5/5     Deep Tendon Relfexes: 2+ and symmetric in the upper and lower extremities bilaterally. Babinski downgoing bilaterally.    Coordination: Finger to nose WNL.     Gait: Normal heel, toe, tandem, and casual gait.      LABS   Results for CASIMIRO ZAMORANO (MRN [id number]) as of 5/8/2017 10:05   Ref. Range 4/25/2017 19:30   AChR Binding Ab, Serum Latest Ref Range: <=0.02 nmol/L 0.00   AChR Ganglionic Neuronal Ab Latest Ref Range: <=0.02 nmol/L 0.00   NMO Interpretive Comments Unknown SEE BELOW   PAVAL reflex test added Unknown None.   Striated Muscle Ab Latest Ref Range: <1:120 titer Negative   CRMP-5 IgG Latest Ref Range: <1:240 titer Negative   N-Type Calcium Channel Ab Latest Ref Range: <=0.03 nmol/L 0.00   P/Q Type Calcium Channel Ab Latest Ref Range: <=0.02 nmol/L 0.00   PAVAL CAMMIE-1, Serum Latest Ref Range: <1:240 titer Negative   PAVAL CAMMIE-2, Serum Latest Ref Range: <1:240 titer Negative   PAVAL CAMMIE-3, Serum Latest Ref Range: <1:240 titer Negative   PAVAL AGNA-1, Serum Latest Ref Range: <1:240 titer Negative   PAVAL,  Amphiphysin Ab, Serum Latest Ref Range: <1:240 titer Negative   Neuronal (V-G) K+ Channel Ab, Serum Latest Ref Range: <=0.02 nmol/L 0.00   PAVAL, PCA-1, Serum Latest Ref Range: <1:240 titer Negative   PAVAL, PCA-2, Serum Latest Ref Range: <1:240 titer Negative   PAVAL, PCA-Tr, Serum Latest Ref Range: <1:240 titer Negative   Results for CASIMIRO ZAMORANO (MRN [id number]) as of 5/8/2017 10:05   Ref. Range 4/4/2017 12:26   Anti-SSA Antibody Latest Ref Range: 0.00 - 19.99 EU 1.03   Anti-SSA Interpretation Latest Ref Range: Negative  Negative   Anti-SSB Antibody Latest Ref Range: 0.00 - 19.99 EU 0.10   Anti-SSB Interpretation Latest Ref Range: Negative  Negative   ds DNA Ab Latest Ref Range: Negative 1:10  Negative 1:10   Antigliadin Ab IgG Latest Ref Range: <20 UNITS 3    Antigliadin Abs, IgA Latest Ref Range: <20 UNITS 6   TTG IgA Latest Ref Range: <20 UNITS 5   TTG IgG Latest Ref Range: <20 UNITS 3   Immunoglobulin A (IgA) Latest Ref Range: 70 - 400 mg/dL 205   Cytoplasmic Neutrophilic Ab Latest Ref Range: <1:20 Titer <1:20   Perinuclear (P-ANCA) Latest Ref Range: <1:20 Titer <1:20   Protein, Serum Latest Ref Range: 6.0 - 8.4 g/dL 6.8   Albumin grams/dl Latest Ref Range: 3.35 - 5.55 g/dL 4.16   Alpha-1 grams/dl Latest Ref Range: 0.17 - 0.41 g/dL 0.26   Alpha-2 grams/dl Latest Ref Range: 0.43 - 0.99 g/dL 0.64   Beta grams/dl Latest Ref Range: 0.50 - 1.10 g/dL 0.64   Gamma grams/dl Latest Ref Range: 0.67 - 1.58 g/dL 1.10   Results for CASIMIRO ZAMORANO (MRN [id number]) as of 5/8/2017 10:05   Ref. Range 4/21/2017 10:42   Lyme Ab Latest Ref Range: <0.90 Index Value 0.16   LYME AB IGG BY WB: Latest Ref Range: Negative  Negative   Lyme IgG Bands Latest Units: kDa No bands detected   Lyme Ab IgM by WB: Latest Ref Range: Negative  Negative   Lyme IgM Bands Latest Units: kDa p41,   Results for CASIMIRO ZAMORANO (MRN [id number]) as of 5/8/2017 10:05   Ref. Range 4/21/2017 14:51   Color, CSF Latest Ref Range: Colorless  Colorless   Heme Aliquot Latest Units: mL 5.0   Appearance, CSF Latest Ref Range: Clear  Clear   WBC, CSF Latest Ref Range: 0 - 5 /cu mm 0   RBC, CSF Latest Ref Range: 0 /cu mm 1 (A)   Glucose, CSF Latest Ref Range: 40 - 70 mg/dL 60   Protein, CSF Latest Ref Range: 15 - 40 mg/dL 20   Angio Convert Enzyme, CSF Latest Ref Range: 0.0 - 2.5 U/L 1.1   CSF Bands Latest Units: bands 0   Serum Bands Latest Units: bands 0   Olig Bands Interpretation, CSF Latest Ref Range: <4 bands 0   IgG Index, CSF Latest Ref Range: <=0.85  0.42   Albumin, CSF Latest Ref Range: <=27.0 mg/dL 9.1   IGG/ALBUMIN RATIO, CSF Latest Ref Range: <=0.21  0.10   IgG Synthetic Rate Latest Ref Range: <=12 mg/24 h 0.00   Albumin, Serum Latest Ref Range: 3200 - 4800 mg/dL 3870   IgG, CSF Latest Ref  Range: <=8.1 mg/dL 0.9       IMAGING   MRI BRAIN   Impression       No evidence of acute intracranial abnormality, specifically no evidence of acute infarct, enhancing lesion, or abnormal parenchymal signal.       MRI CERVICAL SPINE   Impression        1. No evidence of cord signal abnormality or abnormal pathologic enhancement throughout the cervical and thoracic cord.    2. Small central disc protrusion at the level of C5-C6 with mild effacement of the anterior thecal sac and minimal mass effect upon the anterior cervical cord.       MRI THORACIC   Impression        1. No evidence of cord signal abnormality or abnormal pathologic enhancement throughout the cervical and thoracic cord.    2. Small central disc protrusion at the level of C5-C6 with mild effacement of the anterior thecal sac and minimal mass effect upon the anterior cervical cord.       MRI LUMBAR SPINE   Impression       1.  No abnormality of the lumbar spine is detected.    2.  Probable 5.7 cm left ovarian cyst.  Further evaluation with a pelvic ultrasound exam is recommended in 6 weeks to ensure resolution.    Report has been flagged in the EPIC medical record system.       ASSESSMENT:     ICD-10-CM ICD-9-CM   1. Numbness and tingling R20.0 782.0    R20.2    2. Anxiety F41.9 300.00   3. Arthralgia, unspecified joint M25.50 719.40       PLAN:  Orders Placed This Encounter    EMG w/ Ultrasound and Nerve Conduction     Orders Placed This Encounter   Procedures    EMG w/ Ultrasound and Nerve Conduction     43 yo female with pain, numbness, and tingling of distal lower extremities and numbness, tingling of distal upper extremities. Imaging of brain and spine unremarkable, lumbar puncture unremarkable. Query small fiber neuropathy.     -repeat EMG   -RTC after above         The patient indicates understanding of these issues and agrees to the plan.      Attending, Dr. Alvarez, was available during today's encounter. Any change to plan along with  cosign to appear in the EMR.       This document has been electronically signed by Patrica Merida PA-C on 5/8/2017, 2:18 PM. I have personally typed this message using the EMR.

## 2017-05-04 ENCOUNTER — PROCEDURE VISIT (OUTPATIENT)
Dept: NEUROLOGY | Facility: CLINIC | Age: 42
End: 2017-05-04
Payer: COMMERCIAL

## 2017-05-04 DIAGNOSIS — R20.0 NUMBNESS AND TINGLING: ICD-10-CM

## 2017-05-04 DIAGNOSIS — R20.2 NUMBNESS AND TINGLING: ICD-10-CM

## 2017-05-04 PROCEDURE — 95911 NRV CNDJ TEST 9-10 STUDIES: CPT | Mod: S$GLB,,, | Performed by: PSYCHIATRY & NEUROLOGY

## 2017-05-08 ENCOUNTER — PATIENT MESSAGE (OUTPATIENT)
Dept: RHEUMATOLOGY | Facility: CLINIC | Age: 42
End: 2017-05-08

## 2017-05-08 DIAGNOSIS — Z83.49 FAMILY HISTORY OF HEMOCHROMATOSIS: Primary | ICD-10-CM

## 2017-05-09 ENCOUNTER — PATIENT MESSAGE (OUTPATIENT)
Dept: RHEUMATOLOGY | Facility: CLINIC | Age: 42
End: 2017-05-09

## 2017-06-12 ENCOUNTER — LAB VISIT (OUTPATIENT)
Dept: LAB | Facility: HOSPITAL | Age: 42
End: 2017-06-12
Attending: PSYCHIATRY & NEUROLOGY
Payer: COMMERCIAL

## 2017-06-12 ENCOUNTER — OFFICE VISIT (OUTPATIENT)
Dept: NEUROLOGY | Facility: CLINIC | Age: 42
End: 2017-06-12
Payer: COMMERCIAL

## 2017-06-12 VITALS
HEART RATE: 83 BPM | DIASTOLIC BLOOD PRESSURE: 62 MMHG | SYSTOLIC BLOOD PRESSURE: 101 MMHG | WEIGHT: 136.44 LBS | HEIGHT: 64 IN | BODY MASS INDEX: 23.29 KG/M2

## 2017-06-12 DIAGNOSIS — M79.10 MUSCLE PAIN: ICD-10-CM

## 2017-06-12 DIAGNOSIS — M25.522 PAIN OF BOTH ELBOWS: ICD-10-CM

## 2017-06-12 DIAGNOSIS — M25.521 PAIN OF BOTH ELBOWS: ICD-10-CM

## 2017-06-12 DIAGNOSIS — R20.2 NUMBNESS AND TINGLING: Primary | ICD-10-CM

## 2017-06-12 DIAGNOSIS — R20.0 NUMBNESS AND TINGLING: Primary | ICD-10-CM

## 2017-06-12 LAB — CK SERPL-CCNC: 66 U/L

## 2017-06-12 PROCEDURE — 36415 COLL VENOUS BLD VENIPUNCTURE: CPT

## 2017-06-12 PROCEDURE — 82550 ASSAY OF CK (CPK): CPT

## 2017-06-12 PROCEDURE — 99999 PR PBB SHADOW E&M-EST. PATIENT-LVL III: CPT | Mod: PBBFAC,,, | Performed by: PHYSICIAN ASSISTANT

## 2017-06-12 PROCEDURE — 99214 OFFICE O/P EST MOD 30 MIN: CPT | Mod: S$GLB,,, | Performed by: PHYSICIAN ASSISTANT

## 2017-06-12 NOTE — PROGRESS NOTES
[facility], Department of Neurology   [facility] [place] [address]  Phone:[phone]  Fax: [phone]    Patient Name: Eunice Hickman  : 1975  MRN:  [id number]    2017     chief complaint: pain    PCP: Abigail Roberts MD.      Interval hx 17:  Eunice Hickman is a 43 yo female who presents for follow up of numbness, tingling, and pain of distal upper and lower extremities.     Burning neuropathic pain has improved. Still has intermittent numbness and tingling, tingling is somewhat bothersome.  She notes joint and muscle pain, mostly in the upper extremities (elbows, biceps). Thinks she may have tennis elbow. This is worse with increased physical activity but can also occur at rest. Took Naproxen with some relief. Cant sleep on arms due to pain, denies weakness.     Increased pain in lower extremities with prolonged standing. Describes as aching pain.     On alpha lipoic acid, B12, and Gabapentin 100 mg tid. Taking Gabapentin bid.     Recent EMG was normal.     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     Pt does have anxiety, under a lot of stress at work and at home. Has three young children, one of which is disabled and requires a lot of physical assistance including lifting her. Recently began seeing a therapist to help her.    HPI 5/3/17:  Eunice Hickman is a 43 yo female with anxiety referred by Dr. Koch.    Onset of pain in toes during ski trip over [employer]. Got larger ski boots, helped a little initially. However, next couple days of skiing had increased pain in toes, especially towards the end of the day. When she returned from the trip, she noted numbness of toes and tingling of fingers. Numbness and tingling in distal upper and lower extremities is constant. She has aching pain of bl feet with prolonged walking.     She also notes joint pain  "of elbows, wrists, and occasionally knees. Elbow pain has awoken her from sleep.     Looking back, notes she did have increased joint pain prior to ski trip.     Denies autonomic symptoms.     Pt has has MRI brain, cervical, thoracic, lumbar spine which was unrevealing. Also had LP- unremarkable. Outside EMG with reportedly "small abnormality."    Medications:   Current Outpatient Prescriptions   Medication Sig Dispense Refill    alpha lipoic acid 600 mg Cap Take 1 tablet by mouth once daily. 30 each 5    alprazolam (XANAX) 0.25 MG tablet Take 0.25 mg by mouth 2 (two) times daily as needed for Anxiety.      clonazePAM (KLONOPIN) 0.5 MG tablet Take 0.5 mg by mouth once daily.      CYANOCOBALAMIN, VITAMIN B-12, (VITAMIN B-12 ORAL) Take by mouth.      gabapentin (NEURONTIN) 100 MG capsule Take 1 capsule (100 mg total) by mouth 3 (three) times daily as needed (pain). 90 capsule 5    naproxen sodium (ANAPROX) 550 MG tablet TK 1 T PO BID WF  0    ondansetron (ZOFRAN-ODT) 4 MG TbDL Take 1 tablet (4 mg total) by mouth every 6 (six) hours as needed. 30 tablet 0    oxycodone (ROXICODONE) 5 MG immediate release tablet Take 1 tablet (5 mg total) by mouth every 3 (three) hours as needed (Take 1 tablet (5mg) for 4-5/10 pain, 2 tablets (10mg) for 6-7/10 pain, or 3 tablets (15mg) for 8-10/10 pain every 3 hours as needed). 30 tablet 0    zolpidem (AMBIEN) 5 MG Tab TK 1 T PO QHS PRF INSOMNIA  0     No current facility-administered medications for this visit.        Allergies:  Review of patient's allergies indicates:  No Known Allergies    PMHx:  Past Medical History:   Diagnosis Date    Anxiety 2017     Past Surgical History:   Procedure Laterality Date     SECTION      DILATION AND CURETTAGE OF UTERUS      HERNIA REPAIR         Fhx:  Family History   Problem Relation Age of Onset    No Known Problems Mother     No Known Problems Father     No Known Problems Sister     No Known Problems Brother     No " "Known Problems Maternal Aunt     No Known Problems Maternal Uncle     No Known Problems Paternal Aunt     No Known Problems Paternal Uncle     No Known Problems Maternal Grandmother     No Known Problems Maternal Grandfather     No Known Problems Paternal Grandmother     No Known Problems Paternal Grandfather     Amblyopia Neg Hx     Blindness Neg Hx     Cancer Neg Hx     Cataracts Neg Hx     Diabetes Neg Hx     Glaucoma Neg Hx     Hypertension Neg Hx     Macular degeneration Neg Hx     Retinal detachment Neg Hx     Strabismus Neg Hx     Stroke Neg Hx     Thyroid disease Neg Hx        Shx:   Social History     Social History    Marital status:      Spouse name: N/A    Number of children: N/A    Years of education: N/A     Occupational History    Not on file.     Social History Main Topics    Smoking status: Never Smoker    Smokeless tobacco: Never Used    Alcohol use Yes      Comment: occasionally    Drug use: No    Sexual activity: Yes     Partners: Male     Other Topics Concern    Not on file     Social History Narrative    No narrative on file       ROS:  Review of Systems (Questions asked; positives or additions noted in BOLD)  Gen Malaise/fatigue, weight change   HEENT Hearing loss, blurred vision, diplopia   Card CP, palpitations   Pulm SOB, cough    Vasc Easy bruising, easy bleeding    GI Nausea, vomiting, constipation    Frequency, urgency   M/S Joint pain, myalgias, falls    Endocrine Temperature intolerance, polyuria, polydipsia   Neuro Dizziness, tremors, seizures, focal weakness, Others- as noted in HPI   Psych Memory loss, depression, anxiety, hallucinations     PHYSICAL EXAM:   /62   Pulse 83   Ht 5' 4" (1.626 m)   Wt 61.9 kg (136 lb 7.4 oz)   BMI 23.42 kg/m²    GEN:  NAD, well-developed, well-groomed.  HEENT: No cervical lymphadenopathy noted.  CARDIOVASCULAR: Radial pulses 2+ bilaterally. No LE edema noted.  NEURO:  Mental Status: Awake, alert, and " oriented. Normal attention and concentration.  Speech is fluent and appropriate language function.    Cranial Nerves:  II Pupils are round and reactive to direct and consensual light and accommodation. Visual fields full to confrontation.   III, IV, VI Extraocular eye movements full bilaterally. No ptosis noted.   V Normal facial sensation to pinprick and light touch.   VII Symmetric facial expressions.   VIII Hearing intact to finger rub bilaterally.   IX, X Palate elevates midline and symmetrically.   XI Shoulder elevation is symmetric and full strength bilaterally. Neck rotation strength is normal bilaterally.   XII Tongue is midline.     Sensory: Sensation is normal to pinprick, vibration and proprioception in the upper and lower extremities bilaterally. Romberg is Negative.    Motor: Extremities demonstrate normal bulk and tone in all four limbs. No atrophy or fasciculations noted. No pronator drift.   Strength-       RUE: 5/5                LUE: 5/5                RLE: 5/5                LLE: 5/5     Deep Tendon Relfexes: 2+ and symmetric in the upper and lower extremities bilaterally. Babinski downgoing bilaterally.    Coordination: Finger to nose WNL.     Gait: Normal heel, toe, tandem, and casual gait.      LABS   Results for CASIMIRO ZAMORANO (MRN [id number]) as of 5/8/2017 10:05   Ref. Range 4/25/2017 19:30   AChR Binding Ab, Serum Latest Ref Range: <=0.02 nmol/L 0.00   AChR Ganglionic Neuronal Ab Latest Ref Range: <=0.02 nmol/L 0.00   NMO Interpretive Comments Unknown SEE BELOW   PAVAL reflex test added Unknown None.   Striated Muscle Ab Latest Ref Range: <1:120 titer Negative   CRMP-5 IgG Latest Ref Range: <1:240 titer Negative   N-Type Calcium Channel Ab Latest Ref Range: <=0.03 nmol/L 0.00   P/Q Type Calcium Channel Ab Latest Ref Range: <=0.02 nmol/L 0.00   PAVAL CAMMIE-1, Serum Latest Ref Range: <1:240 titer Negative   PAVAL CAMMIE-2, Serum Latest Ref Range: <1:240 titer Negative   PAVAL CAMMIE-3,  Serum Latest Ref Range: <1:240 titer Negative   PAVAL AGNA-1, Serum Latest Ref Range: <1:240 titer Negative   PAVAL,  Amphiphysin Ab, Serum Latest Ref Range: <1:240 titer Negative   Neuronal (V-G) K+ Channel Ab, Serum Latest Ref Range: <=0.02 nmol/L 0.00   PAVAL, PCA-1, Serum Latest Ref Range: <1:240 titer Negative   PAVAL, PCA-2, Serum Latest Ref Range: <1:240 titer Negative   PAVAL, PCA-Tr, Serum Latest Ref Range: <1:240 titer Negative   Results for LACEYANDRESCASIMIROLEY (MRN [id number]) as of 5/8/2017 10:05   Ref. Range 4/4/2017 12:26   Anti-SSA Antibody Latest Ref Range: 0.00 - 19.99 EU 1.03   Anti-SSA Interpretation Latest Ref Range: Negative  Negative   Anti-SSB Antibody Latest Ref Range: 0.00 - 19.99 EU 0.10   Anti-SSB Interpretation Latest Ref Range: Negative  Negative   ds DNA Ab Latest Ref Range: Negative 1:10  Negative 1:10   Antigliadin Ab IgG Latest Ref Range: <20 UNITS 3   Antigliadin Abs, IgA Latest Ref Range: <20 UNITS 6   TTG IgA Latest Ref Range: <20 UNITS 5   TTG IgG Latest Ref Range: <20 UNITS 3   Immunoglobulin A (IgA) Latest Ref Range: 70 - 400 mg/dL 205   Cytoplasmic Neutrophilic Ab Latest Ref Range: <1:20 Titer <1:20   Perinuclear (P-ANCA) Latest Ref Range: <1:20 Titer <1:20   Protein, Serum Latest Ref Range: 6.0 - 8.4 g/dL 6.8   Albumin grams/dl Latest Ref Range: 3.35 - 5.55 g/dL 4.16   Alpha-1 grams/dl Latest Ref Range: 0.17 - 0.41 g/dL 0.26   Alpha-2 grams/dl Latest Ref Range: 0.43 - 0.99 g/dL 0.64   Beta grams/dl Latest Ref Range: 0.50 - 1.10 g/dL 0.64   Gamma grams/dl Latest Ref Range: 0.67 - 1.58 g/dL 1.10   Results for LACEYLEXLUZCOLLETTECASIMIROLEY (MRN [id number]) as of 5/8/2017 10:05   Ref. Range 4/21/2017 10:42   Lyme Ab Latest Ref Range: <0.90 Index Value 0.16   LYME AB IGG BY WB: Latest Ref Range: Negative  Negative   Lyme IgG Bands Latest Units: kDa No bands detected   Lyme Ab IgM by WB: Latest Ref Range: Negative  Negative   Lyme IgM Bands Latest Units: kDa p41,   Results for  JAUN CASIMIRO YOO (MRN [id number]) as of 5/8/2017 10:05   Ref. Range 4/21/2017 14:51   Color, CSF Latest Ref Range: Colorless  Colorless   Heme Aliquot Latest Units: mL 5.0   Appearance, CSF Latest Ref Range: Clear  Clear   WBC, CSF Latest Ref Range: 0 - 5 /cu mm 0   RBC, CSF Latest Ref Range: 0 /cu mm 1 (A)   Glucose, CSF Latest Ref Range: 40 - 70 mg/dL 60   Protein, CSF Latest Ref Range: 15 - 40 mg/dL 20   Angio Convert Enzyme, CSF Latest Ref Range: 0.0 - 2.5 U/L 1.1   CSF Bands Latest Units: bands 0   Serum Bands Latest Units: bands 0   Olig Bands Interpretation, CSF Latest Ref Range: <4 bands 0   IgG Index, CSF Latest Ref Range: <=0.85  0.42   Albumin, CSF Latest Ref Range: <=27.0 mg/dL 9.1   IGG/ALBUMIN RATIO, CSF Latest Ref Range: <=0.21  0.10   IgG Synthetic Rate Latest Ref Range: <=12 mg/24 h 0.00   Albumin, Serum Latest Ref Range: 3200 - 4800 mg/dL 3870   IgG, CSF Latest Ref Range: <=8.1 mg/dL 0.9       IMAGING   MRI BRAIN   Impression       No evidence of acute intracranial abnormality, specifically no evidence of acute infarct, enhancing lesion, or abnormal parenchymal signal.       MRI CERVICAL SPINE   Impression        1. No evidence of cord signal abnormality or abnormal pathologic enhancement throughout the cervical and thoracic cord.    2. Small central disc protrusion at the level of C5-C6 with mild effacement of the anterior thecal sac and minimal mass effect upon the anterior cervical cord.       MRI THORACIC   Impression        1. No evidence of cord signal abnormality or abnormal pathologic enhancement throughout the cervical and thoracic cord.    2. Small central disc protrusion at the level of C5-C6 with mild effacement of the anterior thecal sac and minimal mass effect upon the anterior cervical cord.       MRI LUMBAR SPINE   Impression       1.  No abnormality of the lumbar spine is detected.    2.  Probable 5.7 cm left ovarian cyst.  Further evaluation with a pelvic ultrasound exam  is recommended in 6 weeks to ensure resolution.    Report has been flagged in the EPIC medical record system.       ASSESSMENT:     ICD-10-CM ICD-9-CM   1. Numbness and tingling R20.0 782.0    R20.2    2. Muscle pain M79.1 729.1   3. Pain of both elbows M25.522 719.42    M25.521        PLAN:  Orders Placed This Encounter    CK     Orders Placed This Encounter   Procedures    CK     41 yo female with pain, numbness, and tingling of distal lower extremities and numbness, tingling of distal upper extremities. Imaging of brain and spine unremarkable, lumbar puncture unremarkable, EMG normal. Informed patient possible small fiber neuropathy- though symptoms are typically more constant. She describes joint pain of bl elbows and muscle pain of biceps. Will obtain a CK today, but doubt myopathy as her strength is good. Recommend she see ortho for possible tennis elbow. Could consider Podiatry in the future for foot pain that I do no believe represents neuropathy.    -labs  -can increase gabapentin to 100/100/300  -recommend she see ortho for possible tennis elbow, also has possible structural pain in her feet  -RTC 3 mos          The patient indicates understanding of these issues and agrees to the plan.      Attending, Dr. Alvarez, was available during today's encounter. Any change to plan along with cosign to appear in the EMR.       This document has been electronically signed by Patrica Merida PA-C on 6/13/2017, 2:18 PM. I have personally typed this message using the EMR.

## 2017-06-12 NOTE — LETTER
June 13, 2017      Maria De Jesus Ames MD  [address]  [place] [address]           [facility]  [address]  [place] [address]  Phone: [phone]  Fax: [phone]          Patient: Eunice Hickman   MR Number: [id number]   YOB: 1975   Date of Visit: 6/12/2017       Dear Dr. Maria De Jesus Ames:    Thank you for referring Eunice Hickman to me for evaluation. Attached you will find relevant portions of my assessment and plan of care.    If you have questions, please do not hesitate to call me. I look forward to following Eunice Hickman along with you.    Sincerely,    Patrica Merida PA-C    Enclosure  CC:  No Recipients    If you would like to receive this communication electronically, please contact [email or web address] or [phone] to request more information on [facility] access.    For providers and/or their staff who would like to refer a patient to [facility], please contact us through our one-stop-shop provider referral line, [facility], at [phone].    If you feel you have received this communication in error or would no longer like to receive these types of communications, please e-mail [email or web address]

## 2018-01-25 NOTE — ASSESSMENT & PLAN NOTE
42 year old female presents to ED with worsening peripheral paresthesias. Neurology consulted for management.    - intermittent tingling, burning, weakness in distal extremities, also with amaya-oral tingling, symptoms have seemed to progress over last couple months  - seen by Dr. 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 Has brisk reflexes and length dependent sensory loss. Needs differentiation of demyelinating versus axonal.  - had EMG at outside facility, report reviewed and unimpressive  - MRI W WO Brain, C-spine, T-spine preformed on 4/20/17 showed small central disc protrusion at the level of C5-C6 with mild effacement of the anterior thecal sac and minimal mass effect upon the anterior cervical cord, otherwise no evidence of enhancing lesions or demyelinating disease  - will perform LP/CSF studies today, will need repeat EMG as outpatient  - will also check Lyme studies, ESR, CRP, Heavy metals screen, Cryoglobulins  - will continue to follow   Patent

## 2018-04-03 ENCOUNTER — TELEPHONE (OUTPATIENT)
Dept: PEDIATRIC NEUROLOGY | Facility: CLINIC | Age: 43
End: 2018-04-03

## 2018-04-03 NOTE — TELEPHONE ENCOUNTER
Mom called and is reporting that she has been trying to contact Dr. Lea in regards to patient having multiple seizures daily since medication change (topamax). Patient continues to wean off Pheno and takes trileptal daily. Mom reports some seizures even knock patient off her feet. Denies that seizures are clustering and denies needing ER visit at this time. Will forward message to Dr. Lea, please advise.

## 2021-08-11 ENCOUNTER — CLINICAL SUPPORT (OUTPATIENT)
Dept: URGENT CARE | Facility: CLINIC | Age: 46
End: 2021-08-11
Payer: COMMERCIAL

## 2021-08-11 DIAGNOSIS — Z11.9 ENCOUNTER FOR SCREENING EXAMINATION FOR INFECTIOUS DISEASE: Primary | ICD-10-CM

## 2021-08-11 LAB
CTP QC/QA: YES
SARS-COV-2 RDRP RESP QL NAA+PROBE: NEGATIVE

## 2021-08-11 PROCEDURE — U0002: ICD-10-PCS | Mod: QW,S$GLB,, | Performed by: FAMILY MEDICINE

## 2021-08-11 PROCEDURE — U0002 COVID-19 LAB TEST NON-CDC: HCPCS | Mod: QW,S$GLB,, | Performed by: FAMILY MEDICINE

## 2021-08-11 PROCEDURE — 99211 PR OFFICE/OUTPT VISIT, EST, LEVL I: ICD-10-PCS | Mod: S$GLB,CS,, | Performed by: FAMILY MEDICINE

## 2021-08-11 PROCEDURE — 99211 OFF/OP EST MAY X REQ PHY/QHP: CPT | Mod: S$GLB,CS,, | Performed by: FAMILY MEDICINE

## 2021-08-13 ENCOUNTER — CLINICAL SUPPORT (OUTPATIENT)
Dept: URGENT CARE | Facility: CLINIC | Age: 46
End: 2021-08-13
Payer: COMMERCIAL

## 2021-08-13 DIAGNOSIS — Z11.9 SCREENING EXAMINATION FOR UNSPECIFIED INFECTIOUS DISEASE: Primary | ICD-10-CM

## 2021-08-13 LAB
CTP QC/QA: YES
SARS-COV-2 RDRP RESP QL NAA+PROBE: NEGATIVE

## 2021-08-13 PROCEDURE — U0002: ICD-10-PCS | Mod: QW,S$GLB,, | Performed by: NURSE PRACTITIONER

## 2021-08-13 PROCEDURE — 99211 OFF/OP EST MAY X REQ PHY/QHP: CPT | Mod: S$GLB,,, | Performed by: NURSE PRACTITIONER

## 2021-08-13 PROCEDURE — 99211 PR OFFICE/OUTPT VISIT, EST, LEVL I: ICD-10-PCS | Mod: S$GLB,,, | Performed by: NURSE PRACTITIONER

## 2021-08-13 PROCEDURE — U0002 COVID-19 LAB TEST NON-CDC: HCPCS | Mod: QW,S$GLB,, | Performed by: NURSE PRACTITIONER

## 2022-02-22 NOTE — ASSESSMENT & PLAN NOTE
Chest Pain Protocol    Called for CP, patient states began a week ago and has been constant since. States CP is pressure substernal aggravated by coughing and alleviated by inhaler or neb treatments. Pain is not worse with deep inspiration, only with coughing. Patient had echo, acutely unremarkable, and negative troponin. Was seen by cardiology, no further cardiac testing recommended and have signed off. Stat EKG ordered, non ischemic, NSR, no ST-T changes.  PE  VSS   AAOx3 NAD sitting up in bed  RRR s1 s2 normal  Lungs diminished b/l L > R  abd obese ND.NT  Ext no edema  Neuro no focal deficits    A/P    Pleuritic CP  - likely 2/2 COPD  - RT at bedside, unable to do nebs, inhaler treatment given   - ekg - acutely unremarkable  - continue home inhalers  - if no improvement recommend pulm evaluation    Marta Hayes MD  House officer     Mrs. Hickman is a 42 year old woman who is admitted for a post-LP headache. She had some mild, initial relief with IV caffiene and fluids, with minimal relief from PRNs. She was not receiving scheduled fioricet, and had about 12 hours without any analgesics. Anesthesia has been consulted and have provided recommendations for conservative management, and will reevaluate to determine if she will be a candidate for a blood patch should conservative measures not work.     Recommendations:  -- Continue IV fluids and additional one time of IV caffeine per primary team  -- Consider scheduled Magnesium sulfate 2g BID fur additional relief  -- Continue fioricet and oxycodone (as tolerated) per anesthesia recs (appreciated) - consider scheduling fioricet per their recs

## 2023-09-20 ENCOUNTER — OFFICE VISIT (OUTPATIENT)
Dept: URGENT CARE | Facility: CLINIC | Age: 48
End: 2023-09-20
Payer: COMMERCIAL

## 2023-09-20 VITALS
HEIGHT: 64 IN | BODY MASS INDEX: 24.75 KG/M2 | TEMPERATURE: 98 F | HEART RATE: 104 BPM | WEIGHT: 145 LBS | DIASTOLIC BLOOD PRESSURE: 80 MMHG | RESPIRATION RATE: 18 BRPM | OXYGEN SATURATION: 99 % | SYSTOLIC BLOOD PRESSURE: 130 MMHG

## 2023-09-20 DIAGNOSIS — J06.9 VIRAL URI: Primary | ICD-10-CM

## 2023-09-20 DIAGNOSIS — H93.8X3 EAR PRESSURE, BILATERAL: ICD-10-CM

## 2023-09-20 DIAGNOSIS — J34.3 HYPERTROPHY OF NASAL TURBINATES: ICD-10-CM

## 2023-09-20 DIAGNOSIS — R42 DIZZINESS: ICD-10-CM

## 2023-09-20 DIAGNOSIS — H92.03 OTALGIA OF BOTH EARS: ICD-10-CM

## 2023-09-20 DIAGNOSIS — J34.89 SINUS PRESSURE: ICD-10-CM

## 2023-09-20 PROBLEM — N94.3 PMS (PREMENSTRUAL SYNDROME): Status: ACTIVE | Noted: 2019-01-29

## 2023-09-20 PROBLEM — R01.1 SYSTOLIC MURMUR: Status: ACTIVE | Noted: 2021-04-18

## 2023-09-20 PROBLEM — N92.0 MENORRHAGIA WITH REGULAR CYCLE: Status: ACTIVE | Noted: 2020-11-23

## 2023-09-20 PROBLEM — R06.00 PND (PAROXYSMAL NOCTURNAL DYSPNEA): Status: ACTIVE | Noted: 2021-04-18

## 2023-09-20 PROBLEM — G47.00 INSOMNIA: Status: ACTIVE | Noted: 2020-11-23

## 2023-09-20 PROBLEM — R92.30 DENSE BREASTS: Status: ACTIVE | Noted: 2020-11-23

## 2023-09-20 LAB
CTP QC/QA: YES
SARS-COV-2 AG RESP QL IA.RAPID: NEGATIVE

## 2023-09-20 PROCEDURE — 99213 OFFICE O/P EST LOW 20 MIN: CPT | Mod: S$GLB,,, | Performed by: NURSE PRACTITIONER

## 2023-09-20 PROCEDURE — 87811 SARS CORONAVIRUS 2 ANTIGEN POCT, MANUAL READ: ICD-10-PCS | Mod: QW,S$GLB,, | Performed by: NURSE PRACTITIONER

## 2023-09-20 PROCEDURE — 99213 PR OFFICE/OUTPT VISIT, EST, LEVL III, 20-29 MIN: ICD-10-PCS | Mod: S$GLB,,, | Performed by: NURSE PRACTITIONER

## 2023-09-20 PROCEDURE — 87811 SARS-COV-2 COVID19 W/OPTIC: CPT | Mod: QW,S$GLB,, | Performed by: NURSE PRACTITIONER

## 2023-09-20 RX ORDER — FLUTICASONE PROPIONATE 50 MCG
2 SPRAY, SUSPENSION (ML) NASAL DAILY PRN
Qty: 15.8 ML | Refills: 0 | Status: SHIPPED | OUTPATIENT
Start: 2023-09-20

## 2023-09-20 RX ORDER — MECLIZINE HYDROCHLORIDE 25 MG/1
25 TABLET ORAL 2 TIMES DAILY PRN
Qty: 10 TABLET | Refills: 0 | Status: SHIPPED | OUTPATIENT
Start: 2023-09-20 | End: 2023-12-17 | Stop reason: ALTCHOICE

## 2023-09-20 NOTE — PROGRESS NOTES
"Subjective:      Patient ID: Eunice Hickman is a 48 y.o. female.    Vitals:  height is 5' 4" (1.626 m) and weight is 65.8 kg (145 lb). Her oral temperature is 98.3 °F (36.8 °C). Her blood pressure is 130/80 and her pulse is 104. Her respiration is 18 and oxygen saturation is 99%.     Chief Complaint: Headache    Patient presents wit headache,dizziness,light headed,fatigue. 24 hours onset       48-year-old female presents to clinic with complaints of headache, dizziness, fatigue started earlier today. History positive for Moderna covid vaccine x 3 doses 3/8/21, 4/14/21, 12/13/21     Headache   This is a new problem. The current episode started today. The problem has been unchanged. The pain is at a severity of 6/10. Associated symptoms include dizziness and sinus pressure. The treatment provided no relief.       Constitution: Positive for fatigue.   HENT:  Positive for sinus pressure.    Skin:  Negative for erythema.   Neurological:  Positive for dizziness and headaches.      Objective:     Physical Exam   Constitutional: She is oriented to person, place, and time. She appears well-developed.   HENT:   Head: Normocephalic and atraumatic. Head is without abrasion, without contusion and without laceration.   Ears:   Right Ear: External ear normal. Tympanic membrane is bulging.   Left Ear: External ear normal. Tympanic membrane is bulging.   Nose: Mucosal edema present. Right sinus exhibits maxillary sinus tenderness and frontal sinus tenderness. Left sinus exhibits maxillary sinus tenderness and frontal sinus tenderness.   Mouth/Throat: Oropharynx is clear and moist and mucous membranes are normal.   Eyes: Conjunctivae, EOM and lids are normal. Pupils are equal, round, and reactive to light.   Neck: Trachea normal and phonation normal. Neck supple.   Cardiovascular: Normal rate, regular rhythm and normal heart sounds.   Pulmonary/Chest: Effort normal and breath sounds normal. No stridor. No respiratory " distress.   Musculoskeletal: Normal range of motion.         General: Normal range of motion.   Neurological: She is alert and oriented to person, place, and time.   Skin: Skin is warm, dry, intact and no rash. Capillary refill takes less than 2 seconds. No abrasion, No burn, No bruising, No erythema and No ecchymosis   Psychiatric: Her speech is normal and behavior is normal. Judgment and thought content normal.   Nursing note and vitals reviewed.      Assessment:     1. Viral URI    2. Sinus pressure    3. Ear pressure, bilateral    4. Otalgia of both ears    5. Hypertrophy of nasal turbinates    6. Dizziness      Results for orders placed or performed in visit on 09/20/23   SARS Coronavirus 2 Antigen, POCT Manual Read   Result Value Ref Range    SARS Coronavirus 2 Antigen Negative Negative     Acceptable Yes       Plan:       Viral URI    Sinus pressure  -     SARS Coronavirus 2 Antigen, POCT Manual Read    Ear pressure, bilateral  -     fluticasone propionate (FLONASE) 50 mcg/actuation nasal spray; 2 sprays (100 mcg total) by Each Nostril route daily as needed (nasal congestion).  Dispense: 15.8 mL; Refill: 0  -     meclizine (ANTIVERT) 25 mg tablet; Take 1 tablet (25 mg total) by mouth 2 (two) times daily as needed for Dizziness.  Dispense: 10 tablet; Refill: 0    Otalgia of both ears    Hypertrophy of nasal turbinates  -     fluticasone propionate (FLONASE) 50 mcg/actuation nasal spray; 2 sprays (100 mcg total) by Each Nostril route daily as needed (nasal congestion).  Dispense: 15.8 mL; Refill: 0    Dizziness  -     meclizine (ANTIVERT) 25 mg tablet; Take 1 tablet (25 mg total) by mouth 2 (two) times daily as needed for Dizziness.  Dispense: 10 tablet; Refill: 0        Patient Instructions   Please drink plenty of fluids.  Please get plenty of rest.  Please return here or go to the Emergency Department for any concerns or worsening of condition.  It is ok to take over the counter  plain Allegra  or Claritin or Zyrtec.   We recommend you take Flonase (Fluticasone) or another nasally inhaled steroid unless you are already taking one.  Nasal irrigation with a saline spray or Netti Pot like device per their directions is also recommended.  If not allergic, please take over the counter Tylenol (Acetaminophen) and/or Motrin (Ibuprofen) as directed for control of pain and/or fever.  Please follow up with your primary care doctor or specialist as needed.    If you  smoke, please stop smoking.

## 2023-09-21 NOTE — PATIENT INSTRUCTIONS
Please drink plenty of fluids.  Please get plenty of rest.  Please return here or go to the Emergency Department for any concerns or worsening of condition.  It is ok to take over the counter  plain Allegra or Claritin or Zyrtec.   We recommend you take Flonase (Fluticasone) or another nasally inhaled steroid unless you are already taking one.  Nasal irrigation with a saline spray or Netti Pot like device per their directions is also recommended.  If not allergic, please take over the counter Tylenol (Acetaminophen) and/or Motrin (Ibuprofen) as directed for control of pain and/or fever.  Please follow up with your primary care doctor or specialist as needed.    If you  smoke, please stop smoking.

## 2023-10-03 ENCOUNTER — TELEPHONE (OUTPATIENT)
Dept: OTOLARYNGOLOGY | Facility: CLINIC | Age: 48
End: 2023-10-03
Payer: COMMERCIAL

## 2023-12-17 ENCOUNTER — OFFICE VISIT (OUTPATIENT)
Dept: URGENT CARE | Facility: CLINIC | Age: 48
End: 2023-12-17
Payer: COMMERCIAL

## 2023-12-17 VITALS
OXYGEN SATURATION: 98 % | BODY MASS INDEX: 24.75 KG/M2 | TEMPERATURE: 99 F | RESPIRATION RATE: 19 BRPM | WEIGHT: 145 LBS | HEART RATE: 104 BPM | DIASTOLIC BLOOD PRESSURE: 83 MMHG | HEIGHT: 64 IN | SYSTOLIC BLOOD PRESSURE: 121 MMHG

## 2023-12-17 DIAGNOSIS — U07.1 COVID-19: Primary | ICD-10-CM

## 2023-12-17 DIAGNOSIS — R50.9 FEVER, UNSPECIFIED FEVER CAUSE: ICD-10-CM

## 2023-12-17 LAB
CTP QC/QA: YES
CTP QC/QA: YES
POC MOLECULAR INFLUENZA A AGN: NEGATIVE
POC MOLECULAR INFLUENZA B AGN: NEGATIVE
SARS-COV-2 AG RESP QL IA.RAPID: POSITIVE

## 2023-12-17 PROCEDURE — 87502 POCT INFLUENZA A/B MOLECULAR: ICD-10-PCS | Mod: QW,S$GLB,, | Performed by: NURSE PRACTITIONER

## 2023-12-17 PROCEDURE — 87502 INFLUENZA DNA AMP PROBE: CPT | Mod: QW,S$GLB,, | Performed by: NURSE PRACTITIONER

## 2023-12-17 PROCEDURE — 99213 OFFICE O/P EST LOW 20 MIN: CPT | Mod: S$GLB,,, | Performed by: NURSE PRACTITIONER

## 2023-12-17 PROCEDURE — 87811 SARS-COV-2 COVID19 W/OPTIC: CPT | Mod: QW,S$GLB,, | Performed by: NURSE PRACTITIONER

## 2023-12-17 PROCEDURE — 87811 SARS CORONAVIRUS 2 ANTIGEN POCT, MANUAL READ: ICD-10-PCS | Mod: QW,S$GLB,, | Performed by: NURSE PRACTITIONER

## 2023-12-17 PROCEDURE — 99213 PR OFFICE/OUTPT VISIT, EST, LEVL III, 20-29 MIN: ICD-10-PCS | Mod: S$GLB,,, | Performed by: NURSE PRACTITIONER

## 2023-12-17 NOTE — PROGRESS NOTES
"Subjective:      Patient ID: Eunice Hickman is a 48 y.o. female.    Vitals:  height is 5' 4" (1.626 m) and weight is 65.8 kg (145 lb). Her temperature is 99.1 °F (37.3 °C). Her blood pressure is 121/83 and her pulse is 104. Her respiration is 19 and oxygen saturation is 98%.     Chief Complaint: Other Misc (Feel flu like symptoms - Entered by patient)    Patient presents with flu-like symptoms that started last night. Patient experienced chills, congestion, headaches/bodyaches, and slight sore throat. Patient took ibuprofen and dayquil this morning.    Fever   This is a new problem. The current episode started yesterday. The problem occurs constantly. The problem has been gradually worsening. The maximum temperature noted was 99 to 99.9 F. The temperature was taken using an oral thermometer. Associated symptoms include congestion, coughing, headaches and a sore throat. She has tried acetaminophen for the symptoms. The treatment provided no relief.       Constitution: Positive for fever.   HENT:  Positive for congestion and sore throat.    Respiratory:  Positive for cough.    Neurological:  Positive for headaches.      Objective:     Physical Exam   Constitutional: She is oriented to person, place, and time.   HENT:   Head: Normocephalic and atraumatic.   Cardiovascular: Tachycardia present.   Pulmonary/Chest: Effort normal. No respiratory distress.   Abdominal: Normal appearance.   Neurological: She is alert and oriented to person, place, and time.   Skin: Skin is warm and dry.   Psychiatric: Her behavior is normal. Mood normal.       Results for orders placed or performed in visit on 12/17/23   SARS Coronavirus 2 Antigen, POCT Manual Read   Result Value Ref Range    SARS Coronavirus 2 Antigen Positive (A) Negative     Acceptable Yes    POCT Influenza A/B MOLECULAR   Result Value Ref Range    POC Molecular Influenza A Ag Negative Negative, Not Reported    POC Molecular Influenza B Ag " Negative Negative, Not Reported     Acceptable Yes         Assessment:     1. COVID-19    2. Fever, unspecified fever cause        Plan:   Flu test negative    Covid risk score    0     Pt would like the antiviral sent in        You have tested positive for COVID-19 today.           ISOLATION    If you tested positive and do not have symptoms, you must isolate for 5 days starting on the day of the positive test.          If you tested positive and have symptoms, you must isolate for 5 days starting on the day of the first symptoms,  not the day of the positive test.         This is the most important part, both the CDC and the [facility] emphasize that you do not test out of isolation.         After 5 days, if your symptoms have improved and you have not had fever on day 5, you can return to the community on day 6- NO TESTING REQUIRED!          In fact, we do not retest if you were positive in the last 90 days.         After your 5 days of isolation are completed, the CDC recommends strict mask use for the first 5 days that you come out of isolation.          COVID-19  -     nirmatrelvir-ritonavir 300 mg (150 mg x 2)-100 mg copackaged tablets (EUA); Take 3 tablets by mouth 2 (two) times daily for 5 days. Each dose contains 2 nirmatrelvir (pink tablets) and 1 ritonavir (white tablet). Take all 3 tablets together  Dispense: 30 tablet; Refill: 0    Fever, unspecified fever cause  -     SARS Coronavirus 2 Antigen, POCT Manual Read  -     POCT Influenza A/B MOLECULAR

## 2023-12-17 NOTE — LETTER
[address] [facility] [address]  Phone: [phone]  Fax: [phone]          Return to Work/School    Patient: Eunice Hickman  YOB: 1975   Date: 12/17/2023     To Whom It May Concern:     Eunice Hickman was in contact with/seen in my office on 12/17/2023. COVID-19 is present in our communities across the [facility]. There is limited testing for COVID at this time, so not all patients can be tested. In this situation, your employee meets the following criteria:     Eunice Hickman has met the criteria for COVID-19 testing and has a POSITIVE result. She can return to work once they are asymptomatic for 24 hours without the use of fever reducing medications AND at least five days from the start of symptoms (or from the first positive result if they have no symptoms).      If you have any questions or concerns, or if I can be of further assistance, please do not hesitate to contact me.     Sincerely,    Tiki Aragon, NP

## 2023-12-18 ENCOUNTER — PATIENT MESSAGE (OUTPATIENT)
Dept: RESEARCH | Facility: HOSPITAL | Age: 48
End: 2023-12-18
Payer: COMMERCIAL

## 2024-05-07 ENCOUNTER — PATIENT MESSAGE (OUTPATIENT)
Dept: SPORTS MEDICINE | Facility: CLINIC | Age: 49
End: 2024-05-07
Payer: COMMERCIAL

## 2024-05-10 ENCOUNTER — OFFICE VISIT (OUTPATIENT)
Dept: SPORTS MEDICINE | Facility: CLINIC | Age: 49
End: 2024-05-10
Payer: COMMERCIAL

## 2024-05-10 ENCOUNTER — HOSPITAL ENCOUNTER (OUTPATIENT)
Dept: RADIOLOGY | Facility: HOSPITAL | Age: 49
Discharge: HOME | End: 2024-05-10
Attending: PHYSICIAN ASSISTANT
Payer: COMMERCIAL

## 2024-05-10 VITALS — BODY MASS INDEX: 25.95 KG/M2 | WEIGHT: 152 LBS | HEIGHT: 64 IN

## 2024-05-10 DIAGNOSIS — M25.552 LEFT HIP PAIN: ICD-10-CM

## 2024-05-10 DIAGNOSIS — M76.892 HIP FLEXOR TENDONITIS, LEFT: ICD-10-CM

## 2024-05-10 DIAGNOSIS — M70.62 GREATER TROCHANTERIC BURSITIS, LEFT: ICD-10-CM

## 2024-05-10 DIAGNOSIS — M25.552 LEFT HIP PAIN: Primary | ICD-10-CM

## 2024-05-10 PROCEDURE — 1160F RVW MEDS BY RX/DR IN RCRD: CPT | Mod: CPTII,S$GLB,, | Performed by: PHYSICIAN ASSISTANT

## 2024-05-10 PROCEDURE — 99999 PR PBB SHADOW E&M-EST. PATIENT-LVL III: CPT | Mod: PBBFAC,,, | Performed by: PHYSICIAN ASSISTANT

## 2024-05-10 PROCEDURE — 73521 X-RAY EXAM HIPS BI 2 VIEWS: CPT | Mod: 26,,, | Performed by: RADIOLOGY

## 2024-05-10 PROCEDURE — 1159F MED LIST DOCD IN RCRD: CPT | Mod: CPTII,S$GLB,, | Performed by: PHYSICIAN ASSISTANT

## 2024-05-10 PROCEDURE — 73521 X-RAY EXAM HIPS BI 2 VIEWS: CPT | Mod: TC

## 2024-05-10 PROCEDURE — 3008F BODY MASS INDEX DOCD: CPT | Mod: CPTII,S$GLB,, | Performed by: PHYSICIAN ASSISTANT

## 2024-05-10 PROCEDURE — 99204 OFFICE O/P NEW MOD 45 MIN: CPT | Mod: S$GLB,,, | Performed by: PHYSICIAN ASSISTANT

## 2024-05-10 NOTE — PROGRESS NOTES
Subjective:     Chief Complaint: Eunice Hickman is a 49 y.o. female who had concerns including Pain of the Left Hip.    Patient presents to clinic with left hip pain x 6-7 months, which has been progressively worsening. Denies a specific JANNA. However, she is very active and plays tennis 4 days a week. She also does yoga and lifts weights regularly. She reports no significant pain with activity. Pain is increased after activity while at rest and when lying on left side. Pain is located in the left groin, lateral hip, and occasionally radiates down the thigh. Denies any numbness or tingling. She has tried to stretch and apply ice/heat without relief. Pain is described as a deep, ache. Pain is 5/10 at its worst. She works as a CPA.    Pain  Pertinent negatives include no abdominal pain, chest pain, chills, congestion, coughing, fever, headaches, joint swelling, myalgias, nausea, numbness, rash, sore throat or vomiting.       Review of Systems   Constitutional: Negative. Negative for chills, fever, weight gain and weight loss.   HENT:  Negative for congestion and sore throat.    Eyes:  Negative for blurred vision and double vision.   Cardiovascular:  Negative for chest pain, leg swelling and palpitations.   Respiratory:  Negative for cough and shortness of breath.    Hematologic/Lymphatic: Does not bruise/bleed easily.   Skin:  Negative for itching, poor wound healing and rash.   Musculoskeletal:  Positive for joint pain. Negative for back pain, joint swelling, muscle weakness, myalgias and stiffness.   Gastrointestinal:  Negative for abdominal pain, constipation, diarrhea, nausea and vomiting.   Genitourinary: Negative.  Negative for frequency and hematuria.   Neurological:  Negative for dizziness, headaches, numbness, paresthesias and sensory change.   Psychiatric/Behavioral:  Negative for altered mental status and depression. The patient is not nervous/anxious.    Allergic/Immunologic: Negative for hives.                  Objective:     General: Eunice is well-developed, well-nourished, appears stated age, in no acute distress, alert and oriented to time, place and person.     General    Vitals reviewed.  Constitutional: She is oriented to person, place, and time. She appears well-developed and well-nourished. No distress.   HENT:   Head: Normocephalic and atraumatic.   Eyes: EOM are normal.   Cardiovascular:  Normal rate and regular rhythm.            Pulmonary/Chest: Effort normal. No respiratory distress.   Neurological: She is alert and oriented to person, place, and time.   Psychiatric: She has a normal mood and affect. Her behavior is normal. Thought content normal.     General Musculoskeletal Exam   Gait: normal   Pelvic Obliquity: none      Right Knee Exam     Inspection   Alignment:  normal  Effusion: absent    Left Knee Exam     Inspection   Alignment:  normal  Effusion: absent    Right Hip Exam     Inspection   Scars: absent  Swelling: absent  Bruising: absent  No deformity of hip.  Quadriceps Atrophy:  Negative  Erythema: absent    Range of Motion   The patient has normal right hip ROM.  Abduction:  30 normal   Adduction:  20 normal   Extension:  0 normal   Flexion:  120 normal   External rotation:  50 normal   Internal rotation:  20 normal     Muscle Strength   The patient has normal right hip strength.    Tests   Pain w/ forced internal rotation (GIOVANNA): absent  Pain w/ forced external rotation (FADIR): absent  Leslie: negative  Trendelenburg Test: negative  Circumduction test: negative  Stinchfield test: negative  Log Roll: negative  Step-down test: negative  Resisted sit-up pain: negative    Other   Sensation: normal  Left Hip Exam     Inspection   Scars: absent  Swelling: absent  No deformity of hip.  Quadriceps Atrophy:  negative  Erythema: absent  Bruising: absent    Tenderness   The patient tender to palpation of the trochanteric bursa and SI joint.    Range of Motion   The patient has normal left  hip ROM.  Abduction:  30 normal   Adduction:  20 normal   Extension:  0 normal   Flexion:  120 normal   External rotation:  50 normal   Internal rotation: 20 normal     Muscle Strength   The patient has normal left hip strength.     Tests   Pain w/ forced internal rotation (GIOVANNA): absent  Pain w/ forced external rotation (FADIR): present  Leslie: negative  Trendelenburg Test: negative  Circumduction test: positive  Stinchfield test: positive  Log Roll: negative  Step-down test: negative  Resisted sit-up pain: negative    Other   Sensation: normal    Comments:  + GIOVANNA assymetry      Back (L-Spine & T-Spine) / Neck (C-Spine) Exam   Back exam is normal.      Muscle Strength   Right Lower Extremity   Hip Abduction: 5/5   Hip Adduction: 5/5   Hip Flexion: 5/5   Left Lower Extremity   Hip Abduction: 5/5   Hip Adduction: 5/5   Hip Flexion: 4/5 (pain)     Reflexes     Left Side  Achilles:  2+  Quadriceps:  2+    Right Side   Achilles:  2+  Quadriceps:  2+    RADIOGRAPHS: 5/10/24  Bilateral hips:  FINDINGS:  The hip joint spaces are fairly well maintained.  SI joints unremarkable.No fractures identified. The alignment is within normal limits.  No evidence of a marrow replacement process.      Assessment:     Encounter Diagnoses   Name Primary?    Left hip pain Yes    Greater trochanteric bursitis, left     Hip flexor tendonitis, left         Plan:     We have discussed a variety of treatment options including medications, injections, physical therapy and other alternative treatments. I also explained the indications, risks and benefits of surgery. Given the patients hx and examination today, I believe she would benefit from physical therapy. Pt agrees and would like to proceed with physical therapy.    I made the decision to obtain old records of the patient including previous notes and imaging. I independently reviewed and interpreted lab results today as well as prior imaging. Reviewed with patient in detail.    1. OTC  NSAIDs as needed.  2. Ambulatory referral to physical therapy for hip/core strengthening and conditioning. [facility].  3. Ice compress to the affected area 2-3x a day for 15-20 minutes as needed for pain management.  4. Consider MRA of left hip vs intra-articular left hip CSI if pain is refractory to conservative management.  5. RTC to see Roula Bobby PA-C in 8 weeks for follow-up.      All of the patient's questions were answered and the patient will contact us if they have any questions or concerns in the interim.      Patient questionnaires may have been collected.

## 2024-05-22 ENCOUNTER — CLINICAL SUPPORT (OUTPATIENT)
Dept: REHABILITATION | Facility: OTHER | Age: 49
End: 2024-05-22
Payer: COMMERCIAL

## 2024-05-22 DIAGNOSIS — M25.552 LEFT HIP PAIN: ICD-10-CM

## 2024-05-22 DIAGNOSIS — Z74.09 IMPAIRED FUNCTIONAL MOBILITY AND ACTIVITY TOLERANCE: ICD-10-CM

## 2024-05-22 DIAGNOSIS — M76.62 LEFT ACHILLES TENDINITIS: Primary | ICD-10-CM

## 2024-05-22 DIAGNOSIS — M76.892 HIP FLEXOR TENDONITIS, LEFT: ICD-10-CM

## 2024-05-22 DIAGNOSIS — M70.62 GREATER TROCHANTERIC BURSITIS, LEFT: ICD-10-CM

## 2024-05-22 PROCEDURE — 97162 PT EVAL MOD COMPLEX 30 MIN: CPT | Mod: PN | Performed by: PHYSICAL THERAPIST

## 2024-05-22 PROCEDURE — 97530 THERAPEUTIC ACTIVITIES: CPT | Mod: PN | Performed by: PHYSICAL THERAPIST

## 2024-05-22 NOTE — PLAN OF CARE
[facility]   Physical Therapy Initial Evaluation      Name: Eunice Almaraz [facility]  Clinic Number: [id number]    Therapy Diagnosis:   Encounter Diagnoses   Name Primary?    Left hip pain     Greater trochanteric bursitis, left     Hip flexor tendonitis, left     Left Achilles tendinitis Yes    Impaired functional mobility and activity tolerance         Physician: Roula Bobby PA-C    Physician Orders: PT Eval and Treat Start PT now focusing on hip and CORE strengthening program  Left hip flexor tendinitis  Possible left hip labral tear  Left hip greater trochanteric bursitis  Medical Diagnosis from Referral: M25.552 (ICD-10-CM) - Left hip pain M70.62 (ICD-10-CM) - Greater trochanteric bursitis, left M76.892 (ICD-10-CM) - Hip flexor tendonitis, left  Evaluation Date: 5/22/2024  Authorization Period Expiration: 5/10/2025  Plan of Care Expiration: 8/16/2024  Progress Note Due: 6/16/2024  Date of Surgery: n/a  Visit # / Visits authorized: 1/ 1   FOTO: 1/ 3    Precautions: Standard     Time In: 0840  Time Out: 0915  Total Billable Time: 10 minutes    Subjective     Date of onset: 6 months    History of current condition - JAMISON reports: started having pain to L hip about 6 months ago, tried to manage with stretching at home without relief. She reports pain to anterior and lateral hip, and radiates down to knee. She also reports pain to L achilles. She is typically very active including tennis 4 days a week, walking, just started trying to do some pilates and weights on MD recommendation.     Falls: no    Imaging: bone scan films: FINDINGS:  The hip joint spaces are fairly well maintained.  SI joints unremarkable.No fractures identified. The alignment is within normal limits.  No evidence of a marrow replacement process.     Impression:     No acute findings.    Prior Therapy: none for c/c, has done vestibular PT and neck/back PT in the past  Social History: Pt lives with their family in  [facility]  Occupation: CPA part time  Prior Level of Function: I with ADL's and driving. Very active with tennis regularly  Current Level of Function: I with ADL's and driving. Limited with tennis, trying to do some pilates (limited with some positions)    Pain:  Current 3/10, worst 6/10, best 0/10   Location: left anterior and lateral hip, left achilles   Description: Aching, sometimes sharp  Aggravating Factors: hip worse with sitting, lying down particularly on L side, rising from sitting, hip flexion; ankle worse with stairs, tennis  Easing Factors: hip better with movement, ankle better with rest and ice    Patients goals: be able to play tennis, improve core      Medical History:   Past Medical History:   Diagnosis Date    Anxiety 2017       Surgical History:   Eunice Hickman  has a past surgical history that includes Dilation and curettage of uterus;  section; and Hernia repair.    Medications:   Eunice has a current medication list which includes the following prescription(s): alprazolam, fluticasone propionate, and oxycodone.    Allergies:   Review of patient's allergies indicates:  No Known Allergies     Objective      Observation: Pt is alert and oriented, good historian.     Posture: WFL    Gait: no deficits noted on level surface in clinic    Balance:   SLS   R WFL   L increased ankle strategy    Function:   Squat: quad dominance, hip lateral shift to R, limited depth    Hip Range of Motion:  WFL, pain with end range flexion L            Lower Extremity Strength (* indicates pain)  Right LE  Left LE    Knee extension: 5/5 Knee extension: 5/5   Knee flexion: 5/5 Knee flexion: 5/5   Hip flexion: 4+/5 Hip flexion: 4/5*   Hip Internal Rotation:  4-/5    Hip Internal Rotation: 4-/5      Hip External Rotation: 4+/5    Hip External Rotation: 4/5      Hip extension:  4+/5 Hip extension: 4/5   Hip abduction: 4/5 Hip abduction: 4-/5   Hip adduction: 5/5 Hip adduction 4+/5   Ankle  "dorsiflexion: 5/5 Ankle dorsiflexion: 5/5   Ankle plantarflexion: 5/5 Ankle plantarflexion: 3+/5*         Special Tests:  GIOVANNA: + uneven  FADIR: + L    Flexibility:    Hamstring: R = WFL; L = WFL   Quad: R = WFL; L = WFL   Piriformis: R: min; L mild, limited by pain   Leslie's test: R = min ; L = min         Palpation: tenderness to B ITB, L>R TFL, L greater trochanteric bursa, gluts        Limitation/Restriction for FOTO Hip Survey    Therapist reviewed FOTO scores for Eunice Hickman on 5/22/2024.   FOTO documents entered into [employer] - see Media section.    Intake Score: 33%    Goal: 63%         Treatment     Total Treatment time (time-based codes) separate from Evaluation: 10 minutes      JAMISON received the treatments listed below:      therapeutic activities to improve functional performance for 10  minutes, including:  Development, demonstration, and review of home exercise program to include:   TrA bracing 5" x 20   GS stretch 3 x 30"   Soleus stretch 3 x 30"          Patient Education and Home Exercises     Education provided:   - therapy rationale and plan of care    Written Home Exercises Provided: yes. Exercises were reviewed and JAMISON was able to demonstrate them prior to the end of the session.  JAMISON demonstrated good  understanding of the education provided. See EMR under Patient Instructions for exercises provided during therapy sessions.    Assessment     Eunice is a 49 y.o. female referred to outpatient Physical Therapy with a medical diagnosis of M25.552 (ICD-10-CM) - Left hip pain M70.62 (ICD-10-CM) - Greater trochanteric bursitis, left M76.892 (ICD-10-CM) - Hip flexor tendonitis, left. Patient presents with s/s consistent with referring diagnosis, as well as irritation of the L achilles tendon likely due to gait compensations. Weakness with L plantarflexion and L>R hips with MMT as noted. Pain with end range hip flexion L. Tenderness to L posterolateral hip with palpation.     Patient " prognosis is Good.   Patient will benefit from skilled outpatient Physical Therapy to address the deficits stated above and in the chart below, provide patient /family education, and to maximize patientt's level of independence.     Plan of care discussed with patient: Yes  Patient's spiritual, cultural and educational needs considered and patient is agreeable to the plan of care and goals as stated below:     Anticipated Barriers for therapy: standard    Medical Necessity is demonstrated by the following  History  Co-morbidities and personal factors that may impact the plan of care [] LOW: no personal factors / co-morbidities  [x] MODERATE: 1-2 personal factors / co-morbidities  [] HIGH: 3+ personal factors / co-morbidities    Moderate / High Support Documentation:   Co-morbidities affecting plan of care: anxiety, hx hernia repair    Personal Factors:   lifestyle     Examination  Body Structures and Functions, activity limitations and participation restrictions that may impact the plan of care [] LOW: addressing 1-2 elements  [x] MODERATE: 3+ elements  [] HIGH: 4+ elements (please support below)    Moderate / High Support Documentation: sitting, standing, lying, transfers, recreation     Clinical Presentation [] LOW: stable  [x] MODERATE: Evolving  [] HIGH: Unstable     Decision Making/ Complexity Score: moderate       Goals:  Short Term Goals (4 Weeks):  Pt able to stand and walk >=30 minutes with ADLs with <4/10 pain.  Pt able to sit >=30 minutes with CPU use for work  with <4/10 pain.  Pt will demonstrate increased strength by a half grade to allow patient to ascend stairs with increased ease.  Pt able to sleep >4 hours without pain disturbance.  Pt to demonstrate improved functional ability with FOTO limitation >=43% .    Long Term Goals (12 Weeks):  Pt able to stand and walk>=1 hour with with ADLs with <1/10 pain.  Pt able to squat/lift 5# from floor to waist with <1/10 pain.  Pt able to ascend/descend 1  flight of stairs, independently, 1 handrail, with <1/10 pain.  Pt able to sleep a full night without pain disturbance.  Pt demonstrates strength WFL / WNL to allow patient to return to tennis with min to no difficulty.  Pt able to return to full work / recreational activities with min difficulty and pain <1/10.  Pt will be independent with HEP and self management of symptoms.   Pt to demonstrate improved functional ability with FOTO score >=63% .    Plan     Plan of care Certification: 5/22/2024 to 8/16/2024.    Outpatient Physical Therapy 2 times weekly for 12 weeks to include the following interventions: Electrical Stimulation PRN, Gait Training, Iontophoresis (with dexamethasone), Manual Therapy, Moist Heat/ Ice, Neuromuscular Re-ed, Patient Education, Therapeutic Activities, Therapeutic Exercise, and Dry Needling .     Eunice Valdez, PT       Physician's Signature: _________________________________________ Date: ________________

## 2024-05-24 ENCOUNTER — CLINICAL SUPPORT (OUTPATIENT)
Dept: REHABILITATION | Facility: OTHER | Age: 49
End: 2024-05-24
Payer: COMMERCIAL

## 2024-05-24 DIAGNOSIS — Z74.09 IMPAIRED FUNCTIONAL MOBILITY AND ACTIVITY TOLERANCE: ICD-10-CM

## 2024-05-24 DIAGNOSIS — M76.62 LEFT ACHILLES TENDINITIS: ICD-10-CM

## 2024-05-24 DIAGNOSIS — M25.552 LEFT HIP PAIN: Primary | ICD-10-CM

## 2024-05-24 PROCEDURE — 97112 NEUROMUSCULAR REEDUCATION: CPT | Mod: PN

## 2024-05-24 PROCEDURE — 97140 MANUAL THERAPY 1/> REGIONS: CPT | Mod: PN

## 2024-05-24 PROCEDURE — 97530 THERAPEUTIC ACTIVITIES: CPT | Mod: PN

## 2024-05-24 NOTE — PROGRESS NOTES
[facility]   Physical Therapy Treatment Note      Name: Eunice Almaraz [facility]  Clinic Number: [id number]    Therapy Diagnosis:   Encounter Diagnoses   Name Primary?    Left hip pain Yes    Left Achilles tendinitis     Impaired functional mobility and activity tolerance      Physician: Roula Bobby PA-C    Visit Date: 5/24/2024    Physician Orders: PT Eval and Treat Start PT now focusing on hip and CORE strengthening program  Left hip flexor tendinitis  Possible left hip labral tear  Left hip greater trochanteric bursitis  Medical Diagnosis from Referral: M25.552 (ICD-10-CM) - Left hip pain M70.62 (ICD-10-CM) - Greater trochanteric bursitis, left M76.892 (ICD-10-CM) - Hip flexor tendonitis, left  Evaluation Date: 5/22/2024  Authorization Period Expiration: 5/10/2025  Plan of Care Expiration: 8/16/2024  Progress Note Due: 6/16/2024  Date of Surgery: n/a  Visit # / Visits authorized: 1/ 1   FOTO: 1/ 3     Precautions: Standard      Time In: 0830  Time Out: 0923  Total Billable Time: 53 minutes    PTA Visit #: 0/5       Subjective     Patient reports: continued L grion pain that radiates down the thigh to the knee with lifting leg when getting out of bed or the car. Also notes discomfort with prolonged sitting.  She was compliant with home exercise program.  Response to previous treatment: fair  Functional change: none    Pain: 4/10  Location: left anterior and lateral hip, left achilles    Objective      Objective Measures updated at progress report unless specified.     Treatment     JAMISON received the treatments listed below:      therapeutic exercises to develop strength, endurance, ROM, flexibility, posture, and core stabilization for 00 minutes including:  None today    manual therapy techniques: Joint mobilizations, Manual traction, Myofacial release, and Soft tissue Mobilization were applied to the: LLE for 10 minutes, including:  STM/MFR c/r for TrP release to glute med/min,  "piriformis, TFL in R SL  Consider dry needling to the L hip in the future prn    neuromuscular re-education activities to improve: Balance, Coordination, Kinesthetic, Sense, Proprioception, and Posture for 20 minutes. The following activities were included:  +bridge 3x10 x purple loop (4-star, heavy) at knees  +bridge with abd pulses x 3x30"  x purple loop (4-star, heavy) at knees  +SL clams x3x15  x purple loop (4-star, heavy) at knees  +long leg bridge x3x10 x green PB    TrA bracing 5" x 20 - defer today, resume next visit  Incline board GS stretch 2 x 60"  Incline board Soleus stretch 2 x 30"       therapeutic activities to improve functional performance for 23  minutes, including:  +Standing heel raises on incline board x20  +Standing soleus heel raises (on ground) x20  +step downs x2x10x 4" step  +side stepping 4 x 50 ft x purple loop (4-star, heavy) at knees  +box squat (tap) x2x10 x purple loop (4-star, heavy) at knees          Patient Education and Home Exercises       Education provided:   - none today    Written Home Exercises Provided: Patient instructed to cont prior HEP. Exercises were reviewed and JAMISON was able to demonstrate them prior to the end of the session.  JAMISON demonstrated good  understanding of the education provided. See Electronic Medical Record under Patient Instructions for exercises provided during therapy sessions    Assessment     Initiated hip strength and stabilization program with good tolerance overall but with noted increased femoral IR of LLE and left trunk rotation, which was most pronounced during step downs, due to continued core and lateral hip strength, stability, increasing compression at hip joint and c/o pain. Some relief with manual TrP release, but plan to initiate dry needling next visit pending pt tolerance today.    JAMISON Is progressing well towards her goals.   Patient prognosis is Good.     Patient will continue to benefit from skilled outpatient physical therapy to " address the deficits listed in the problem list box on initial evaluation, provide pt/family education and to maximize pt's level of independence in the home and community environment.     Patient's spiritual, cultural and educational needs considered and pt agreeable to plan of care and goals.     Anticipated barriers to physical therapy: standard    Goals: updated 5/24/2024   Short Term Goals (4 Weeks):  Pt able to stand and walk >=30 minutes with ADLs with <4/10 pain. (Not met, progressing)  Pt able to sit >=30 minutes with CPU use for work  with <4/10 pain. (Not met, progressing)  Pt will demonstrate increased strength by a half grade to allow patient to ascend stairs with increased ease. (Not met, progressing)  Pt able to sleep >4 hours without pain disturbance. (Not met, progressing)  Pt to demonstrate improved functional ability with FOTO limitation >=43% . (Not met, progressing)     Long Term Goals (12 Weeks):  Pt able to stand and walk>=1 hour with with ADLs with <1/10 pain. (Not met, progressing)  Pt able to squat/lift 5# from floor to waist with <1/10 pain. (Not met, progressing)  Pt able to ascend/descend 1 flight of stairs, independently, 1 handrail, with <1/10 pain. (Not met, progressing)  Pt able to sleep a full night without pain disturbance. (Not met, progressing)  Pt demonstrates strength WFL / WNL to allow patient to return to tennis with min to no difficulty. (Not met, progressing)  Pt able to return to full work / recreational activities with min difficulty and pain <1/10. (Not met, progressing)  Pt will be independent with HEP and self management of symptoms.  (Not met, progressing)  Pt to demonstrate improved functional ability with FOTO score >=63% . (Not met, progressing)    Plan     Cont with POC for strength, stability of hips/ pelvis.    Cristina Ortiz, PT

## 2024-05-27 NOTE — PROGRESS NOTES
"[facility]   Physical Therapy Treatment Note      Name: Eunice Hickman  Clinic Number: [id number]    Therapy Diagnosis:   No diagnosis found.    Physician: Roula Bobby PA-C    Visit Date: 5/28/2024    Physician Orders: PT Eval and Treat Start PT now focusing on hip and CORE strengthening program  Left hip flexor tendinitis  Possible left hip labral tear  Left hip greater trochanteric bursitis  Medical Diagnosis from Referral: M25.552 (ICD-10-CM) - Left hip pain M70.62 (ICD-10-CM) - Greater trochanteric bursitis, left M76.892 (ICD-10-CM) - Hip flexor tendonitis, left  Evaluation Date: 5/22/2024  Authorization Period Expiration: 5/10/2025  Plan of Care Expiration: 8/16/2024  Progress Note Due: 6/16/2024  Date of Surgery: n/a  Visit # / Visits authorized: 3/21   FOTO: 1/ 3     Precautions: Standard      Time In: 7:35 am   Time Out: 8:25 am   Total Billable Time: 25 minutes    PTA Visit #: 1/5       Subjective     Patient reports: she is tired from a busy weekend. States she drove her two/three kids to camp in [place] yesterday. States she has pain in the AM, but once she moves around it feels better. States she has pain getting out of bed and with first few steps. States she would like to try dry needling as suggested by PT from last visit.     She was compliant with home exercise program.  Response to previous treatment: fair  Functional change: none    Pain: 4-5/10  Location: left anterior and lateral hip, left achilles    Objective      Objective Measures updated at progress report unless specified.     Treatment     JAMISON received the treatments listed below:      therapeutic exercises to develop strength, endurance, ROM, flexibility, posture, and core stabilization for 10 minutes including:  + hip flexor stretch of EOM 2 x 1' B   + HS stretch 3 x 30" B   Incline board GS stretch 2 x 60"   Incline board Soleus stretch 2 x 30"     manual therapy techniques: Joint " "mobilizations, Manual traction, Myofacial release, and Soft tissue Mobilization were applied to the: LLE for 10 minutes, including:  STM/MFR c/r for TrP release to glute med/min, piriformis, TFL in R SL  Consider dry needling to the L hip in the future prn    neuromuscular re-education activities to improve: Balance, Coordination, Kinesthetic, Sense, Proprioception, and Posture for 15 minutes. The following activities were included:  bridge 3x10 x purple loop (4-star, heavy) at knees   bridge with abd pulses x 3x30"  x purple loop (4-star, heavy) at knees  SL clams x3x15  x purple loop (4-star, heavy) at knees   long leg bridge x3x10 x green PB     TrA bracing 5" x 20 - defer today, resume next visit       therapeutic activities to improve functional performance for 25 minutes, including:   Standing heel raises on incline board x20   Standing soleus heel raises (on ground) x20  step downs x2x10x 4" step  side stepping 4 x 50 ft x purple loop (4-star, heavy) at knees  box squat (tap on 18" step) x2x10 x purple loop (4-star, heavy) at knees          Patient Education and Home Exercises       Education provided:   - none today    Written Home Exercises Provided: Patient instructed to cont prior HEP. Exercises were reviewed and NICOLLE was able to demonstrate them prior to the end of the session.  NICOLLE demonstrated good  understanding of the education provided. See Electronic Medical Record under Patient Instructions for exercises provided during therapy sessions    Assessment     Nicolle had overall good tolerance to treatment today with no adverse effects. Initiated EOM hip flexor stretch and HS stretch, which helped improve pt's mm tightness. Consider commencing dry needling nv. Required verbal cues for proper technique with taps on box, pt able to self correct. Educated pt on stretching prior to getting out of bed to improve muscle flexibility and stiffness in AM. Continue to monitor and progress pt as tolerated.     NICOLLE Is " progressing well towards her goals.   Patient prognosis is Good.     Patient will continue to benefit from skilled outpatient physical therapy to address the deficits listed in the problem list box on initial evaluation, provide pt/family education and to maximize pt's level of independence in the home and community environment.     Patient's spiritual, cultural and educational needs considered and pt agreeable to plan of care and goals.     Anticipated barriers to physical therapy: standard    Goals: updated 5/27/2024   Short Term Goals (4 Weeks):  Pt able to stand and walk >=30 minutes with ADLs with <4/10 pain. (Not met, progressing)  Pt able to sit >=30 minutes with CPU use for work  with <4/10 pain. (Not met, progressing)  Pt will demonstrate increased strength by a half grade to allow patient to ascend stairs with increased ease. (Not met, progressing)  Pt able to sleep >4 hours without pain disturbance. (Not met, progressing)  Pt to demonstrate improved functional ability with FOTO limitation >=43% . (Not met, progressing)     Long Term Goals (12 Weeks):  Pt able to stand and walk>=1 hour with with ADLs with <1/10 pain. (Not met, progressing)  Pt able to squat/lift 5# from floor to waist with <1/10 pain. (Not met, progressing)  Pt able to ascend/descend 1 flight of stairs, independently, 1 handrail, with <1/10 pain. (Not met, progressing)  Pt able to sleep a full night without pain disturbance. (Not met, progressing)  Pt demonstrates strength WFL / WNL to allow patient to return to tennis with min to no difficulty. (Not met, progressing)  Pt able to return to full work / recreational activities with min difficulty and pain <1/10. (Not met, progressing)  Pt will be independent with HEP and self management of symptoms.  (Not met, progressing)  Pt to demonstrate improved functional ability with FOTO score >=63% . (Not met, progressing)    Plan   Plan of Care Expiration: 8/16/2024    Cont with POC for strength,  stability of hips/ pelvis.    Vashti Jurado, PTA

## 2024-05-28 ENCOUNTER — DOCUMENTATION ONLY (OUTPATIENT)
Dept: REHABILITATION | Facility: OTHER | Age: 49
End: 2024-05-28

## 2024-05-28 ENCOUNTER — CLINICAL SUPPORT (OUTPATIENT)
Dept: REHABILITATION | Facility: OTHER | Age: 49
End: 2024-05-28
Payer: COMMERCIAL

## 2024-05-28 DIAGNOSIS — Z74.09 IMPAIRED FUNCTIONAL MOBILITY AND ACTIVITY TOLERANCE: ICD-10-CM

## 2024-05-28 DIAGNOSIS — M76.62 LEFT ACHILLES TENDINITIS: ICD-10-CM

## 2024-05-28 DIAGNOSIS — M25.552 LEFT HIP PAIN: Primary | ICD-10-CM

## 2024-05-28 PROCEDURE — 97530 THERAPEUTIC ACTIVITIES: CPT | Mod: PN,CQ

## 2024-05-28 PROCEDURE — 97112 NEUROMUSCULAR REEDUCATION: CPT | Mod: PN,CQ

## 2024-05-28 PROCEDURE — 97110 THERAPEUTIC EXERCISES: CPT | Mod: PN,CQ

## 2024-05-28 NOTE — PROGRESS NOTES
CRS Post-operative visit    Visit Info:     DATE OF PROCEDURE:  12/20/2018.     PREOPERATIVE DIAGNOSIS:  Intraabdominal perforation.     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     PROCEDURES PERFORMED:  Exploratory laparotomy, irrigation of feculent   peritonitis, Juan procedure, and ileocecal tenonectomy.       INDICATIONS FOR PROCEDURE:  Mr. Fairbanks is a 70-year-old male who is status post   orthotopic liver transplant , who has developed PTLD, who was admitted 3   days prior with free air and intra-abdominal fluid.  However, due to his   chemotherapy, he had no white cells.  A drain was placed.  Subsequently, as his   white count has continued to improve, he has developed peritonitis, worsening   pain, was taken to the Operating Room emergently    Current Status: Doing well postoperatively.  He was recently discharged from rehabilitation.  Status post readmission for drain placement His energy is improving his activity is improving his stoma is pink and functioning his wound is opened is granulating with hydrocolloid the drain has stopped putting out anything he does notice that he is somewhat asymmetrical in his abdomen that his right side is now more swollen than his left   Physical Exam:  General: White male in NAD sitting in chair in clinic  Neuro: aaox4 maex4 perrl  Respiratory: resps even unlabored  Cardiac: cap refill <2 sec  Abdomen: Abdomen soft, tender to deep palpation on the right BS normal. No masses, organomegaly . Incisions:clean, with some granulation tissue     Assessment and Plan:  Alan was seen today for post-op evaluation.     :    Postoperative state    MRI reviewed no intra-abdominal pathology  Continue local wound care   He will follow-up for colostomy closure in 2-3 months pending LTS approval       Physical Therapist and Physical Therapist Assistant met face to face to discuss patient's treatment plan and progress towards established goals. Pt will be seen by a physical therapist minimally every 6th visit or every 30 days.    Vashti Jurado, ELAYNE  05/28/2024

## 2024-05-30 ENCOUNTER — CLINICAL SUPPORT (OUTPATIENT)
Dept: REHABILITATION | Facility: OTHER | Age: 49
End: 2024-05-30
Payer: COMMERCIAL

## 2024-05-30 DIAGNOSIS — Z74.09 IMPAIRED FUNCTIONAL MOBILITY AND ACTIVITY TOLERANCE: ICD-10-CM

## 2024-05-30 DIAGNOSIS — M76.62 LEFT ACHILLES TENDINITIS: ICD-10-CM

## 2024-05-30 DIAGNOSIS — M25.552 LEFT HIP PAIN: Primary | ICD-10-CM

## 2024-05-30 PROCEDURE — 97140 MANUAL THERAPY 1/> REGIONS: CPT | Mod: PN | Performed by: PHYSICAL THERAPIST

## 2024-05-30 PROCEDURE — 97530 THERAPEUTIC ACTIVITIES: CPT | Mod: PN | Performed by: PHYSICAL THERAPIST

## 2024-05-30 NOTE — PROGRESS NOTES
"[facility]   Physical Therapy Treatment Note      [facility]  [facility] Number[facility] [id number]    Therapy Diagnosis:   Encounter Diagnoses   Name Primary?    Left hip pain Yes    Left Achilles tendinitis     Impaired functional mobility and activity tolerance        Physician: Roula Bobby PA-C    Visit Date: 5/30/2024    Physician Orders: PT Eval and Treat Start PT now focusing on hip and CORE strengthening program  Left hip flexor tendinitis  Possible left hip labral tear  Left hip greater trochanteric bursitis  Medical Diagnosis from Referral: M25.552 (ICD-10-CM) - Left hip pain M70.62 (ICD-10-CM) - Greater trochanteric bursitis, left M76.892 (ICD-10-CM) - Hip flexor tendonitis, left  Evaluation Date: 5/22/2024  Authorization Period Expiration: 5/10/2025  Plan of Care Expiration: 8/16/2024  Progress Note Due: 6/16/2024  Date of Surgery: n/a  Visit # / Visits authorized: 4/21   FOTO: 1/ 3     Precautions: Standard      Time In: 1420   Time Out: 1505  Total Billable Time: 40 minutes    PTA Visit #: 0/5       Subjective     Patient reports: doing ok overall, muscle soreness from pilates yesterday. Interested in trying dry needling to hip today (will hold on achilles because she has a game tonight and is worried about soreness).     She was compliant with home exercise program.  Response to previous treatment: fair  Functional change: none    Pain: 4-5/10  Location: left anterior and lateral hip, left achilles    Objective      Objective Measures updated at progress report unless specified.     Treatment     JAMISON received the treatments listed below:      therapeutic exercises to develop strength, endurance, ROM, flexibility, posture, and core stabilization for 00 minutes including:  + hip flexor stretch of EOM 2 x 1' B   + HS stretch 3 x 30" B   Incline board GS stretch 2 x 60"   Incline board Soleus stretch 2 x 30"     manual therapy techniques: Joint mobilizations, " "Manual traction, Myofacial release, and Soft tissue Mobilization were applied to the: LLE for 15 minutes, including:  STM/MFR c/r for TrP release to glute med/min, piriformis, TFL in R SL    15 min x Application of TDN: Pt educated on benefits and potential side effects of dry needling. Educated pt on benefits, precautions, side effects following TDN. Educated pt to use heat following treatment sessions if pt is experiencing pain or soreness. Pt verbalized good understanding of education.  Pt signed written consent to dry needling. Pt gave verbal consent for DN    Pt received dry needling to the below listed muscles using 60 mm needles.  In sidelying to L:  TFL  Distal piroformis/OI/QF at greater trochanter  Greater trochanter  Glut min x 2  Glut med x 1 (lateral)  Winding performed every 5 minutes during treatment.      neuromuscular re-education activities to improve: Balance, Coordination, Kinesthetic, Sense, Proprioception, and Posture for 00 minutes. The following activities were included:  bridge 3x10 x purple loop (4-star, heavy) at knees   bridge with abd pulses x 3x30"  x purple loop (4-star, heavy) at knees  SL clams x3x15  x purple loop (4-star, heavy) at knees   long leg bridge x3x10 x green PB     TrA bracing 5" x 20 - defer today, resume next visit       therapeutic activities to improve functional performance for 25 minutes, including:   Pt education including review of dry needling consent and procedure prior to treatment  Standing heel raises on incline board x20   Standing soleus heel raises (on ground) x20  step downs x2x10x 4" step  side stepping 4 x 50 ft x purple loop (4-star, heavy) at knees  box squat (tap on 18" step) x2x10 x purple loop (4-star, heavy) at knees      Moist heat to L hip x 6 minutes to reduce post-needling soreness    Patient Education and Home Exercises       Education provided:   - none today    Written Home Exercises Provided: Patient instructed to cont prior HEP. " Exercises were reviewed and JAMISON was able to demonstrate them prior to the end of the session.  JAMISON demonstrated good  understanding of the education provided. See Electronic Medical Record under Patient Instructions for exercises provided during therapy sessions    Assessment     Dry needling initiated today per pt request. Time spent in pt education regarding procedure and expectations, all questions answered. Good soft tissue response to dry needling evident by increased grasp with unilateral winding at all insertion points. Winding performed every 5 minutes during treatment. No adverse effects following treatment.  Consider addition of achilles at next visit as tolerated.     JAMISON Is progressing well towards her goals.   Patient prognosis is Good.     Patient will continue to benefit from skilled outpatient physical therapy to address the deficits listed in the problem list box on initial evaluation, provide pt/family education and to maximize pt's level of independence in the home and community environment.     Patient's spiritual, cultural and educational needs considered and pt agreeable to plan of care and goals.     Anticipated barriers to physical therapy: standard    Goals: updated 5/30/2024   Short Term Goals (4 Weeks):  Pt able to stand and walk >=30 minutes with ADLs with <4/10 pain. (Not met, progressing)  Pt able to sit >=30 minutes with CPU use for work  with <4/10 pain. (Not met, progressing)  Pt will demonstrate increased strength by a half grade to allow patient to ascend stairs with increased ease. (Not met, progressing)  Pt able to sleep >4 hours without pain disturbance. (Not met, progressing)  Pt to demonstrate improved functional ability with FOTO limitation >=43% . (Not met, progressing)     Long Term Goals (12 Weeks):  Pt able to stand and walk>=1 hour with with ADLs with <1/10 pain. (Not met, progressing)  Pt able to squat/lift 5# from floor to waist with <1/10 pain. (Not met,  progressing)  Pt able to ascend/descend 1 flight of stairs, independently, 1 handrail, with <1/10 pain. (Not met, progressing)  Pt able to sleep a full night without pain disturbance. (Not met, progressing)  Pt demonstrates strength WFL / WNL to allow patient to return to tennis with min to no difficulty. (Not met, progressing)  Pt able to return to full work / recreational activities with min difficulty and pain <1/10. (Not met, progressing)  Pt will be independent with HEP and self management of symptoms.  (Not met, progressing)  Pt to demonstrate improved functional ability with FOTO score >=63% . (Not met, progressing)    Plan   Plan of Care Expiration: 8/16/2024    Cont with POC for strength, stability of hips/ pelvis.    Eunice Valdez, PT

## 2024-06-03 ENCOUNTER — CLINICAL SUPPORT (OUTPATIENT)
Dept: REHABILITATION | Facility: OTHER | Age: 49
End: 2024-06-03
Payer: COMMERCIAL

## 2024-06-03 DIAGNOSIS — M76.62 LEFT ACHILLES TENDINITIS: ICD-10-CM

## 2024-06-03 DIAGNOSIS — M25.552 LEFT HIP PAIN: Primary | ICD-10-CM

## 2024-06-03 DIAGNOSIS — Z74.09 IMPAIRED FUNCTIONAL MOBILITY AND ACTIVITY TOLERANCE: ICD-10-CM

## 2024-06-03 PROCEDURE — 97530 THERAPEUTIC ACTIVITIES: CPT | Mod: PN | Performed by: PHYSICAL THERAPIST

## 2024-06-03 PROCEDURE — 97140 MANUAL THERAPY 1/> REGIONS: CPT | Mod: PN | Performed by: PHYSICAL THERAPIST

## 2024-06-03 NOTE — PROGRESS NOTES
"[facility]   Physical Therapy Treatment Note      [facility]  [facility] Number: [id number]    Therapy Diagnosis:   Encounter Diagnoses   Name Primary?    Left hip pain Yes    Left Achilles tendinitis     Impaired functional mobility and activity tolerance        Physician: Roula Bobby PA-C    Visit Date: 6/3/2024    Physician Orders: PT Eval and Treat Start PT now focusing on hip and CORE strengthening program  Left hip flexor tendinitis  Possible left hip labral tear  Left hip greater trochanteric bursitis  Medical Diagnosis from Referral: M25.552 (ICD-10-CM) - Left hip pain M70.62 (ICD-10-CM) - Greater trochanteric bursitis, left M76.892 (ICD-10-CM) - Hip flexor tendonitis, left  Evaluation Date: 5/22/2024  Authorization Period Expiration: 5/10/2025  Plan of Care Expiration: 8/16/2024  Progress Note Due: 6/16/2024  Date of Surgery: n/a  Visit # / Visits authorized: 5/21   FOTO: 1/ 3     Precautions: Standard      Time In: 0800 (late arrival)   Time Out: 0830  Total Billable Time: 30 minutes    PTA Visit #: 0/5       Subjective     Patient reports: hip still locks, but once she gets moving it loosens up and feels better. She says the achilles is the more constant pain    She was compliant with home exercise program.  Response to previous treatment: min soreness with initial needling to hip  Functional change: none    Pain: 4-5/10  Location: left anterior and lateral hip, left achilles    Objective      Objective Measures updated at progress report unless specified.     Treatment     JAMISON received the treatments listed below:      therapeutic exercises to develop strength, endurance, ROM, flexibility, posture, and core stabilization for 00 minutes including:  + hip flexor stretch of EOM 2 x 1' B   + HS stretch 3 x 30" B       manual therapy techniques: Joint mobilizations, Manual traction, Myofacial release, and Soft tissue Mobilization were applied to the: LLE for " "15 minutes, including:  STM/MFR c/r for TrP release to glute med/min, piriformis, TFL in R SL    15 min x Application of TDN: Pt educated on benefits and potential side effects of dry needling. Educated pt on benefits, precautions, side effects following TDN. Educated pt to use heat following treatment sessions if pt is experiencing pain or soreness. Pt verbalized good understanding of education.  Pt signed written consent to dry needling. Pt gave verbal consent for DN    Pt received dry needling to the below listed muscles using 30 and 40 mm needles.  In sidelying to L:  TFL  Distal piroformis/OI/QF at greater trochanter  Greater trochanter  Glut min x 2  Glut med x 1 (lateral)  Medial/lateral gastroc  Medial/lateral soleus  Medial/lateral achilles  Winding performed every 5 minutes during treatment.      neuromuscular re-education activities to improve: Balance, Coordination, Kinesthetic, Sense, Proprioception, and Posture for 00 minutes. The following activities were included:  bridge 3x10 x purple loop (4-star, heavy) at knees   bridge with abd pulses x 3x30"  x purple loop (4-star, heavy) at knees  SL clams x3x15  x purple loop (4-star, heavy) at knees   long leg bridge x3x10 x green PB     TrA bracing 5" x 20 - defer today, resume next visit       therapeutic activities to improve functional performance for 15 minutes, including:   Pt education including review of dry needling consent and procedure prior to treatment  Standing heel raises on incline board x20   Standing soleus heel raises (on ground) x20  +Eccentric HR on step x20  Incline board GS stretch 2 x 30"   Incline board Soleus stretch 2 x 30"   step downs x2x10x 4" step  side stepping 4 x 50 ft x purple loop (4-star, heavy) at knees  box squat (tap on 18" step) x2x10 x purple loop (4-star, heavy) at knees      Moist heat to L hip x 6 minutes to reduce post-needling soreness    Patient Education and Home Exercises       Education provided:   - none " today    Written Home Exercises Provided: Patient instructed to cont prior HEP. Exercises were reviewed and JAMISON was able to demonstrate them prior to the end of the session.  JAMISON demonstrated good  understanding of the education provided. See Electronic Medical Record under Patient Instructions for exercises provided during therapy sessions    Assessment     Limited progression of therex today 2* time constraints. Needling performed to address L achilles pain today with good soft tissue response noted at all insertion points, strong grasp with unilateral winding particularly at achilles. No adverse effects following treatment. Pt will benefit from resuming core and hip strengthening as tolerated.     JAMISON Is progressing well towards her goals.   Patient prognosis is Good.     Patient will continue to benefit from skilled outpatient physical therapy to address the deficits listed in the problem list box on initial evaluation, provide pt/family education and to maximize pt's level of independence in the home and community environment.     Patient's spiritual, cultural and educational needs considered and pt agreeable to plan of care and goals.     Anticipated barriers to physical therapy: standard    Goals: updated 6/3/2024   Short Term Goals (4 Weeks):  Pt able to stand and walk >=30 minutes with ADLs with <4/10 pain. (Not met, progressing)  Pt able to sit >=30 minutes with CPU use for work  with <4/10 pain. (Not met, progressing)  Pt will demonstrate increased strength by a half grade to allow patient to ascend stairs with increased ease. (Not met, progressing)  Pt able to sleep >4 hours without pain disturbance. (Not met, progressing)  Pt to demonstrate improved functional ability with FOTO limitation >=43% . (Not met, progressing)     Long Term Goals (12 Weeks):  Pt able to stand and walk>=1 hour with with ADLs with <1/10 pain. (Not met, progressing)  Pt able to squat/lift 5# from floor to waist with <1/10 pain.  (Not met, progressing)  Pt able to ascend/descend 1 flight of stairs, independently, 1 handrail, with <1/10 pain. (Not met, progressing)  Pt able to sleep a full night without pain disturbance. (Not met, progressing)  Pt demonstrates strength WFL / WNL to allow patient to return to tennis with min to no difficulty. (Not met, progressing)  Pt able to return to full work / recreational activities with min difficulty and pain <1/10. (Not met, progressing)  Pt will be independent with HEP and self management of symptoms.  (Not met, progressing)  Pt to demonstrate improved functional ability with FOTO score >=63% . (Not met, progressing)    Plan   Plan of Care Expiration: 8/16/2024    Cont with POC for strength, stability of hips/ pelvis. Assess response to dry needling and continue as tolerated.     Eunice Valdez, PT

## 2024-06-10 ENCOUNTER — CLINICAL SUPPORT (OUTPATIENT)
Dept: REHABILITATION | Facility: OTHER | Age: 49
End: 2024-06-10
Payer: COMMERCIAL

## 2024-06-10 DIAGNOSIS — Z74.09 IMPAIRED FUNCTIONAL MOBILITY AND ACTIVITY TOLERANCE: ICD-10-CM

## 2024-06-10 DIAGNOSIS — M25.552 LEFT HIP PAIN: Primary | ICD-10-CM

## 2024-06-10 DIAGNOSIS — M76.62 LEFT ACHILLES TENDINITIS: ICD-10-CM

## 2024-06-10 PROCEDURE — 97530 THERAPEUTIC ACTIVITIES: CPT | Mod: PN | Performed by: PHYSICAL THERAPIST

## 2024-06-10 PROCEDURE — 97112 NEUROMUSCULAR REEDUCATION: CPT | Mod: PN | Performed by: PHYSICAL THERAPIST

## 2024-06-10 NOTE — PROGRESS NOTES
"[facility]   Physical Therapy Treatment Note      Name: Eunice Almaraz [facility]  Clinic Number: [id number]    Therapy Diagnosis:   Encounter Diagnoses   Name Primary?    Left hip pain Yes    Left Achilles tendinitis     Impaired functional mobility and activity tolerance        Physician: Roula Bobby PA-C    Visit Date: 6/10/2024    Physician Orders: PT Eval and Treat Start PT now focusing on hip and CORE strengthening program  Left hip flexor tendinitis  Possible left hip labral tear  Left hip greater trochanteric bursitis  Medical Diagnosis from Referral: M25.552 (ICD-10-CM) - Left hip pain M70.62 (ICD-10-CM) - Greater trochanteric bursitis, left M76.892 (ICD-10-CM) - Hip flexor tendonitis, left  Evaluation Date: 5/22/2024  Authorization Period Expiration: 5/10/2025  Plan of Care Expiration: 8/16/2024  Progress Note Due: 6/16/2024  Date of Surgery: n/a  Visit # / Visits authorized: 6/21   FOTO: 1/ 3     Precautions: Standard      Time In: 1520   Time Out: 1610  Total Billable Time: 50 minutes    PTA Visit #: 0/5       Subjective     Patient reports: she felt some improvement with needling to achilles, and she's been trying to stretch more. She would like to focus on strengthening today.     She was compliant with home exercise program.  Response to previous treatment: min soreness with initial needling to hip  Functional change: none    Pain: 4-5/10  Location: left anterior and lateral hip, left achilles    Objective      Objective Measures updated at progress report unless specified.     Treatment     JAMISON received the treatments listed below:      therapeutic exercises to develop strength, endurance, ROM, flexibility, posture, and core stabilization for 00 minutes including:  + hip flexor stretch of EOM 2 x 1' B   + HS stretch 3 x 30" B       manual therapy techniques: Joint mobilizations, Manual traction, Myofacial release, and Soft tissue Mobilization were applied to the: " "LLE for 00 minutes, including:  STM/MFR c/r for TrP release to glute med/min, piriformis, TFL in R SL    00 min x Application of TDN: Pt educated on benefits and potential side effects of dry needling. Educated pt on benefits, precautions, side effects following TDN. Educated pt to use heat following treatment sessions if pt is experiencing pain or soreness. Pt verbalized good understanding of education.  Pt signed written consent to dry needling. Pt gave verbal consent for DN    Pt received dry needling to the below listed muscles using 30 and 40 mm needles.  In sidelying to L:  TFL  Distal piroformis/OI/QF at greater trochanter  Greater trochanter  Glut min x 2  Glut med x 1 (lateral)  Medial/lateral gastroc  Medial/lateral soleus  Medial/lateral achilles  Winding performed every 5 minutes during treatment.      neuromuscular re-education activities to improve: Balance, Coordination, Kinesthetic, Sense, Proprioception, and Posture for 40 minutes. The following activities were included:    TrA 10 x 10"  +BKFO 2 x 10  +Brace marching x 2 x 10  +SLR flex with pilates ring press x 2 x 10  +Bridge with iso abd pilates ring 3 x 10  bridge 3x10 x purple loop (4-star, heavy) at knees   bridge with abd pulses x 3x30"  x purple loop (4-star, heavy) at knees  SL clams x3x15  x purple loop (4-star, heavy) at knees   +abd hip circles 10x cw/ccw each large and small  long leg bridge x3x10 x green PB            therapeutic activities to improve functional performance for 10 minutes, including:   Pt education including review of dry needling consent and procedure prior to treatment  Standing heel raises on incline board x20   Standing soleus heel raises (on ground) x20  +Eccentric HR on step x20 each B  Incline board GS stretch 2 x 30"   Incline board Soleus stretch 2 x 30"   step downs x2x10x 4" step  side stepping 4 x 50 ft x purple loop (4-star, heavy) at knees  box squat (tap on 18" step) x2x10 x purple loop (4-star, heavy) " at knees      Moist heat to L hip x 0 minutes to reduce post-needling soreness    Patient Education and Home Exercises       Education provided:   - none today    Written Home Exercises Provided: Patient instructed to cont prior HEP. Exercises were reviewed and JAMISON was able to demonstrate them prior to the end of the session.  JAMISON demonstrated good  understanding of the education provided. See Electronic Medical Record under Patient Instructions for exercises provided during therapy sessions    Assessment     Focus on core stabilization and lower extremity strengthening today. Verbal and tactile cuing for technique with core stability exercise, some difficulty with dissociation L>R. Good training effect with hip circles.     JAMISON Is progressing well towards her goals.   Patient prognosis is Good.     Patient will continue to benefit from skilled outpatient physical therapy to address the deficits listed in the problem list box on initial evaluation, provide pt/family education and to maximize pt's level of independence in the home and community environment.     Patient's spiritual, cultural and educational needs considered and pt agreeable to plan of care and goals.     Anticipated barriers to physical therapy: standard    Goals: updated 6/10/2024   Short Term Goals (4 Weeks):  Pt able to stand and walk >=30 minutes with ADLs with <4/10 pain. (Not met, progressing)  Pt able to sit >=30 minutes with CPU use for work  with <4/10 pain. (Not met, progressing)  Pt will demonstrate increased strength by a half grade to allow patient to ascend stairs with increased ease. (Not met, progressing)  Pt able to sleep >4 hours without pain disturbance. (Not met, progressing)  Pt to demonstrate improved functional ability with FOTO limitation >=43% . (Not met, progressing)     Long Term Goals (12 Weeks):  Pt able to stand and walk>=1 hour with with ADLs with <1/10 pain. (Not met, progressing)  Pt able to squat/lift 5# from floor to  waist with <1/10 pain. (Not met, progressing)  Pt able to ascend/descend 1 flight of stairs, independently, 1 handrail, with <1/10 pain. (Not met, progressing)  Pt able to sleep a full night without pain disturbance. (Not met, progressing)  Pt demonstrates strength WFL / WNL to allow patient to return to tennis with min to no difficulty. (Not met, progressing)  Pt able to return to full work / recreational activities with min difficulty and pain <1/10. (Not met, progressing)  Pt will be independent with HEP and self management of symptoms.  (Not met, progressing)  Pt to demonstrate improved functional ability with FOTO score >=63% . (Not met, progressing)    Plan   Plan of Care Expiration: 8/16/2024    Cont with POC for strength, stability of hips/ pelvis. Assess response to dry needling and continue as tolerated.     Eunice Valdez, PT

## 2024-06-12 ENCOUNTER — CLINICAL SUPPORT (OUTPATIENT)
Dept: REHABILITATION | Facility: OTHER | Age: 49
End: 2024-06-12
Payer: COMMERCIAL

## 2024-06-12 DIAGNOSIS — M25.552 LEFT HIP PAIN: Primary | ICD-10-CM

## 2024-06-12 DIAGNOSIS — M76.62 LEFT ACHILLES TENDINITIS: ICD-10-CM

## 2024-06-12 DIAGNOSIS — Z74.09 IMPAIRED FUNCTIONAL MOBILITY AND ACTIVITY TOLERANCE: ICD-10-CM

## 2024-06-12 PROCEDURE — 97112 NEUROMUSCULAR REEDUCATION: CPT | Mod: PN

## 2024-06-12 NOTE — PROGRESS NOTES
"[facility]   Physical Therapy Treatment Note      [facility]  Cl[facility] Number: [id number]    Therapy Diagnosis:   Encounter Diagnoses   Name Primary?    Left hip pain Yes    Left Achilles tendinitis     Impaired functional mobility and activity tolerance        Physician: Roula Bobby PA-C    Visit Date: 6/12/2024    Physician Orders: PT Eval and Treat Start PT now focusing on hip and CORE strengthening program  Left hip flexor tendinitis  Possible left hip labral tear  Left hip greater trochanteric bursitis  Medical Diagnosis from Referral: M25.552 (ICD-10-CM) - Left hip pain M70.62 (ICD-10-CM) - Greater trochanteric bursitis, left M76.892 (ICD-10-CM) - Hip flexor tendonitis, left  Evaluation Date: 5/22/2024  Authorization Period Expiration: 5/10/2025  Plan of Care Expiration: 8/16/2024  Progress Note Due: 6/16/2024  Date of Surgery: n/a  Visit # / Visits authorized: 7/21   FOTO: 1/ 3     Precautions: Standard      Time In: 2:30  Time Out: 3:00  Total Billable Time: 30 minutes    PTA Visit #: 0/5       Subjective     Patient reports: she has to leave by 3:00 today due to time conflicts.   She was compliant with home exercise program.  Response to previous treatment: min soreness with initial needling to hip  Functional change: none    Pain: 4-5/10  Location: left anterior and lateral hip, left achilles    Objective      Objective Measures updated at progress report unless specified.     Treatment     JAMISON received the treatments listed below:      neuromuscular re-education activities to improve: Balance, Coordination, Kinesthetic, Sense, Proprioception, and Posture for 27 minutes. The following activities were included:    TrA 10 x 10"  BKFO 2 x 10  Brace marching x 2 x 10  SLR flex with pilates ring press x 3 x 10  Bridge with iso abd pilates ring 3 x 10  bridge 3x10 x purple loop (4-star, heavy) at knees   bridge with abd pulses x 3x30"  x purple loop (4-star, " "heavy) at knees  SL clams x3x15  x purple loop (4-star, heavy) at knees   abd hip circles 15x cw/ccw each large and small  long leg bridge x3x10 x green PB            therapeutic activities to improve functional performance for 3 minutes, including:   Pt education including review of dry needling consent and procedure prior to treatment  Standing heel raises on incline board x20   Standing soleus heel raises (on ground) x20  Eccentric HR on step x20 each B  Incline board GS stretch 1 x 60"   Incline board Soleus stretch 1 x 60"   step downs x2x10x 4" step  side stepping 4 x 50 ft x purple loop (4-star, heavy) at knees  box squat (tap on 18" step) x2x10 x purple loop (4-star, heavy) at knees      Moist heat to L hip x 0 minutes to reduce post-needling soreness    Patient Education and Home Exercises       Education provided:   - none today    Written Home Exercises Provided: Patient instructed to cont prior HEP. Exercises were reviewed and JAMISON was able to demonstrate them prior to the end of the session.  JAMISON demonstrated good  understanding of the education provided. See Electronic Medical Record under Patient Instructions for exercises provided during therapy sessions    Assessment     Able to increase reps with several exercises today, indicating slow but good improvements in mm activation, endurance. Limited progression of glute and core stabilization as pt notes she has to leave early today.    JAMISON Is progressing well towards her goals.   Patient prognosis is Good.     Patient will continue to benefit from skilled outpatient physical therapy to address the deficits listed in the problem list box on initial evaluation, provide pt/family education and to maximize pt's level of independence in the home and community environment.     Patient's spiritual, cultural and educational needs considered and pt agreeable to plan of care and goals.     Anticipated barriers to physical therapy: standard    Goals: updated " 6/12/2024   Short Term Goals (4 Weeks):  Pt able to stand and walk >=30 minutes with ADLs with <4/10 pain. (Not met, progressing)  Pt able to sit >=30 minutes with CPU use for work  with <4/10 pain. (Not met, progressing)  Pt will demonstrate increased strength by a half grade to allow patient to ascend stairs with increased ease. (Not met, progressing)  Pt able to sleep >4 hours without pain disturbance. (Not met, progressing)  Pt to demonstrate improved functional ability with FOTO limitation >=43% . (Not met, progressing)     Long Term Goals (12 Weeks):  Pt able to stand and walk>=1 hour with with ADLs with <1/10 pain. (Not met, progressing)  Pt able to squat/lift 5# from floor to waist with <1/10 pain. (Not met, progressing)  Pt able to ascend/descend 1 flight of stairs, independently, 1 handrail, with <1/10 pain. (Not met, progressing)  Pt able to sleep a full night without pain disturbance. (Not met, progressing)  Pt demonstrates strength WFL / WNL to allow patient to return to tennis with min to no difficulty. (Not met, progressing)  Pt able to return to full work / recreational activities with min difficulty and pain <1/10. (Not met, progressing)  Pt will be independent with HEP and self management of symptoms.  (Not met, progressing)  Pt to demonstrate improved functional ability with FOTO score >=63% . (Not met, progressing)    Plan   Plan of Care Expiration: 8/16/2024    Cont with POC for strength, stability of hips/ pelvis. Assess response to dry needling and continue as tolerated.     Cristina Ortiz, PT

## 2024-06-19 ENCOUNTER — CLINICAL SUPPORT (OUTPATIENT)
Dept: REHABILITATION | Facility: OTHER | Age: 49
End: 2024-06-19
Payer: COMMERCIAL

## 2024-06-19 DIAGNOSIS — Z74.09 IMPAIRED FUNCTIONAL MOBILITY AND ACTIVITY TOLERANCE: ICD-10-CM

## 2024-06-19 DIAGNOSIS — M25.552 LEFT HIP PAIN: Primary | ICD-10-CM

## 2024-06-19 DIAGNOSIS — M76.62 LEFT ACHILLES TENDINITIS: ICD-10-CM

## 2024-06-19 PROCEDURE — 97112 NEUROMUSCULAR REEDUCATION: CPT | Mod: PN

## 2024-06-19 PROCEDURE — 97530 THERAPEUTIC ACTIVITIES: CPT | Mod: PN

## 2024-06-19 NOTE — PROGRESS NOTES
[facility]   Physical Therapy Treatment Note      [facility]  [facility] Number: [id number]    Therapy Diagnosis:   Encounter Diagnoses   Name Primary?    Left hip pain Yes    Left Achilles tendinitis     Impaired functional mobility and activity tolerance          Physician: Roula Bobby PA-C    Visit Date: 6/19/2024    Physician Orders: PT Eval and Treat Start PT now focusing on hip and CORE strengthening program  Left hip flexor tendinitis  Possible left hip labral tear  Left hip greater trochanteric bursitis  Medical Diagnosis from Referral: M25.552 (ICD-10-CM) - Left hip pain M70.62 (ICD-10-CM) - Greater trochanteric bursitis, left M76.892 (ICD-10-CM) - Hip flexor tendonitis, left  Evaluation Date: 5/22/2024  Authorization Period Expiration: 5/10/2025  Plan of Care Expiration: 8/16/2024  Progress Note Due: 6/16/2024  Date of Surgery: n/a  Visit # / Visits authorized: 8/21   FOTO: 1/ 3     Precautions: Standard      Time In: 745  Time Out: 830  Total Billable Time: 45 minutes    PTA Visit #: 0/5       Subjective     Patient reports: she is leaving tomorrow to go to [place] x 3 weeks to visit her mom. Has a F/U with PA on 7/19 upon return, and that will determine if she needs an MRI/MRA or further PT. While she is OOT she plans on doing pilates classes. Also borrowed her MIL's pilates ring to take with her for exercise.    She was compliant with home exercise program.  Response to previous treatment: min soreness with initial needling to hip  Functional change: none    Pain: 4-5/10  Location: left anterior and lateral hip, left achilles    Objective      Objective Measures updated at progress report unless specified.     Treatment     JAMISON received the treatments listed below:      neuromuscular re-education activities to improve: Balance, Coordination, Kinesthetic, Sense, Proprioception, and Posture for 25 minutes. The following activities were  "included:    TrA 10 x 10"  BKFO 2 x 20 B  Brace march into heel slide x 3 x 10 B  SLR flex with pilates ring press x 3 x 10  Bridge with iso abd pilates ring 3 x 10  bridge 3x10 x purple loop (4-star, heavy) at knees   bridge with abd pulses x 3x30"  x purple loop (4-star, heavy) at knees  SL clams x3x15  x purple loop (4-star, heavy) at knees - GTB today to review for HEP while OOT  abd hip circles 15x cw/ccw each large and small  long leg bridge x3x10 x green PB            therapeutic activities to improve functional performance for 20 minutes, including:     Standing heel raises on incline board x20   Standing soleus heel raises (on ground) x20  Eccentric HR on step x20 each B  Incline board GS stretch 1 x 60"   Incline board Soleus stretch 1 x 60"   step downs x2x10x 4" step - reviewed for HEP  side stepping 4 x 50 ft x purple loop (4-star, heavy) at knees - GTB today for HEP review  box squat (tap on 18" step) x2x10 x purple loop (4-star, heavy) at knees [facility] today for HEP review      Moist heat to L hip x 0 minutes to reduce post-needling soreness    Patient Education and Home Exercises       Education provided:   - none today    Written Home Exercises Provided: Patient instructed to cont prior HEP. Exercises were reviewed and JAMISON was able to demonstrate them prior to the end of the session.  JAMISON demonstrated good  understanding of the education provided. See Electronic Medical Record under Patient Instructions for exercises provided during therapy sessions    Assessment     Reviewed HEP for understanding and technique as pt will be OOTx3 weeks and return on 7/14/24. GTB given to promote lateral hip and pelvic stability with ADLs including squats and bridges. Overall, good tolerance with therex program. Able to perform without increased hip or ankle pain.  Defer PT x 3 weeks as pt will be OOT. Plan to continue prn upon return.  F/U with MD office on 7/19/2024 for further diagnostic testing.    JAMISON Is " progressing well towards her goals.   Patient prognosis is Good.     Patient will continue to benefit from skilled outpatient physical therapy to address the deficits listed in the problem list box on initial evaluation, provide pt/family education and to maximize pt's level of independence in the home and community environment.     Patient's spiritual, cultural and educational needs considered and pt agreeable to plan of care and goals.     Anticipated barriers to physical therapy: standard    Goals: updated 6/19/2024   Short Term Goals (4 Weeks):  Pt able to stand and walk >=30 minutes with ADLs with <4/10 pain. (Not met, progressing)  Pt able to sit >=30 minutes with CPU use for work  with <4/10 pain. (Not met, progressing)  Pt will demonstrate increased strength by a half grade to allow patient to ascend stairs with increased ease. (Not met, progressing)  Pt able to sleep >4 hours without pain disturbance. (Not met, progressing)  Pt to demonstrate improved functional ability with FOTO limitation >=43% . (Not met, progressing)     Long Term Goals (12 Weeks):  Pt able to stand and walk>=1 hour with with ADLs with <1/10 pain. (Not met, progressing)  Pt able to squat/lift 5# from floor to waist with <1/10 pain. (Not met, progressing)  Pt able to ascend/descend 1 flight of stairs, independently, 1 handrail, with <1/10 pain. (Not met, progressing)  Pt able to sleep a full night without pain disturbance. (Not met, progressing)  Pt demonstrates strength WFL / WNL to allow patient to return to tennis with min to no difficulty. (Not met, progressing)  Pt able to return to full work / recreational activities with min difficulty and pain <1/10. (Not met, progressing)  Pt will be independent with HEP and self management of symptoms.  (Not met, progressing)  Pt to demonstrate improved functional ability with FOTO score >=63% . (Not met, progressing)    Plan   Plan of Care Expiration: 8/16/2024    Cont with POC for strength,  stability of hips/ pelvis. Assess response to dry needling and continue as tolerated.     Cristina Ortiz, PT

## 2024-11-07 DIAGNOSIS — M76.62 TENDONITIS, ACHILLES, LEFT: Primary | ICD-10-CM

## 2024-11-13 ENCOUNTER — CLINICAL SUPPORT (OUTPATIENT)
Dept: REHABILITATION | Facility: OTHER | Age: 49
End: 2024-11-13
Attending: ORTHOPAEDIC SURGERY
Payer: COMMERCIAL

## 2024-11-13 DIAGNOSIS — R60.0 LEG EDEMA: ICD-10-CM

## 2024-11-13 DIAGNOSIS — M25.572 CHRONIC PAIN OF LEFT ANKLE: Primary | ICD-10-CM

## 2024-11-13 DIAGNOSIS — G89.29 CHRONIC PAIN OF LEFT ANKLE: Primary | ICD-10-CM

## 2024-11-13 PROCEDURE — 97161 PT EVAL LOW COMPLEX 20 MIN: CPT | Mod: PN

## 2024-11-13 PROCEDURE — 97140 MANUAL THERAPY 1/> REGIONS: CPT | Mod: PN

## 2024-11-13 PROCEDURE — 97530 THERAPEUTIC ACTIVITIES: CPT | Mod: PN

## 2024-11-13 NOTE — PLAN OF CARE
[facility]  Physical Therapy Initial Evaluation    Name: Eunice Hickman  Clinic Number: [id number]    Therapy Diagnosis:   Encounter Diagnoses   Name Primary?    Chronic pain of left ankle Yes    Leg edema      Physician: Jaime Saravia MD    Physician Orders: Physical Therapy Evaluate and Treat  Medical Diagnosis from Referral:  Tendonitis, Achilles, left [M76.62]   Evaluation Date: 2024  Authorization Period Expiration: 2025  Plan of Care Expiration: 2024 to 25  Visit # / Visits authorized:  (pending additional authorization following initial evaluation)     Time In: 0800am  Time Out: 0845am  Total Billable Time: 45 minutes    Precautions: Standard    Subjective     Date of onset: 6 months ago    History of current condition - JAMISON reports that she has had left achilles pain for a couple months. Dr Saravia recommended physical therapy. Pt attended therapy for her left hip recently and it was helpful. Pt plays tennis 4 days per week and feels weak at this time. When her achilles is irritated it bothers her to descend stairs.      Medical History:   Past Medical History:   Diagnosis Date    Anxiety 2017       Surgical History:   Eunice Hickman  has a past surgical history that includes Dilation and curettage of uterus;  section; and Hernia repair.    Medications:   Eunice has a current medication list which includes the following prescription(s): alprazolam, fluticasone propionate, and oxycodone.    Allergies:   Review of patient's allergies indicates:  No Known Allergies     Imaging: negative for serious pathology    Prior Therapy: yes for her left hip 2024  Social History: [place] three children. Pt has a sitter and  who assist with her 13 year old special needs child.   Occupation: Pt works part time as a CPA  Prior Level of Function: Playing tennis 4x/week and working out. (Pt still does this  but has pain after jogging and playing tennis)  Current Level of Function: Pt continues to play tennis but must modify if she is having ankle pain.     Pain:  Current 4/10, worst 9/10, best 2/10   Location: lateral left achilles  Description: sharp pain   Aggravating Factors: descending stairs, jogging, tennis  Easing Factors: stretching     Pts goals: Pt would like to return to tennis with no increase in ankle pain.    Objective     WNL=within normal limits  WFL=within functional limits  NT=not tested  *=pain    Posture: Upright/neutral posture   Palpation: Pain/tenderness to lateral left achilles.   Sensation: WNL  Deep tendon reflexes: NT      Active Range of Motion:   Ankle Right Left   DF  20 15   Plantarflexion 65 60                Strength:  Ankle Right Left   Dorsiflexion 5/5 4/5   Plantarflexion 5/5 4+/5   Inversion 5/5 4/5   Eversion 5/5 4/5          CMS Impairment/Limitation/Restriction for FOTO Survey    Therapist reviewed FOTO scores for Eunice Hickman on 11/13/2024.   FOTO documents entered into [employer] - see Media section.    Limitation Score: TBA%  Predicted Goal: TBA%    Category: Mobility     TREATMENT     Treatment Time In: 0800am  Treatment Time Out: 0845am  Total Treatment time separate from Evaluation: 30 minutes    Therapeutic Activities were provided for 15 minutes to improve tolerance to functional activities including:   Pt education on HEP/POC    Manual Therapy was provided for 15 minutes for improved joint mobility including:  A/P glide L TC joint   Gr V LAD L TC joint    Home Exercises and Patient Education Provided:    Education provided:   - Findings; prognosis and plan of care (POC)  - Home exercise program (HEP)  - Modality options  - Therapist contact information    Written Home Exercises Provided: Yes  Exercises were reviewed and JAMISON was able to demonstrate them prior to the end of the session.  JAMISON demonstrated good understanding of the education provided.     See EMR  under Patient Instructions for exercises provided today.    Assessment     Eunice is a 49 y.o. female referred to outpatient Physical Therapy with a medical diagnosis of achilles pain. Pt presents to PT with pain, decreased ankle ROM, decreased strength and flexibility, poor posture, and functional deficits with standing and walking. These deficits are negatively impacting this patient's ability to complete their work duties and activities of daily living.     Pt prognosis is good.   Pt will benefit from skilled outpatient Physical Therapy to address the deficits stated above and in the chart below, provide pt/family education, and to maximize pt's level of independence.     Plan of care discussed with patient: Yes  Pt's spiritual, cultural and educational needs considered and pt agreeable to plan of care and goals as stated below:     Anticipated Barriers for therapy: None    Medical Necessity is demonstrated by the following  History  Co-morbidities and personal factors that may impact the plan of care Co-morbidities:   advanced age    Personal Factors:   age     low   Examination  Body Structures and Functions, activity limitations and participation restrictions that may impact the plan of care Body Regions:   lower extremities    Body Systems:    gross symmetry  ROM  strength    Participation Restrictions:   Walking    Activity limitations:   Learning and applying knowledge  no deficits    General Tasks and Commands  No Deficits    Communication  No Deficits    Mobility  walking    Self care  no deficits    Domestic Life  No Deficits    Interactions/Relationships  No Deficits    Life Areas  No Deficits    Community and Social Life  No Deficits         moderate   Clinical Presentation stable and uncomplicated low   Decision Making/ Complexity Score: low     GOALS:  Short Term Goals:    1.) Pt will improve their FOTO score by 5% to return to PLOF. (Progressing, not met)  2.) Pt will decrease their left ankle  pain to 2/10 for improved QOL. (Progressing, not met)  3.) Pt will improve their left ankle dorsiflexion AROM to 20 degrees for improved ability to descend stairs. (Progressing, not met)  4.) Pt will improve their left ankle dorsiflexion strength to 5/5 to return to their PLOF. (Progressing, not met)  5.) Pt will become independent with their HEP to improve strength and tolerance to functional activities. (Progressing, not met)    Long Term Goals:  1.) Pt will improve their FOTO score by 10% to return to PLOF. (Progressing, not met)  2.) Pt will decrease their left ankle pain to 0/10 for improved QOL. (Progressing, not met)  3.) Pt will improve their left ankle plantarflexion AROM to 65 degrees for improved return to tennis. (Progressing, not met)  4.) Pt will improve their left ankle plantarflexion strength to 5/5 to return to their PLOF. (Progressing, not met)  5.) Pt will tolerate playing tennis for one hour with no increase in left ankle pain to return to PLOF. (Progressing, not met)       Plan     Plan of care Certification: 11/13/2024 to 2/13/25    Outpatient Physical Therapy 2 times weekly for 10 weeks to include the following interventions: Therapeutic Exercises, Manual Therapeutic Technique, Neuromuscular Re Education, Therapeutic Activities. Modalities, Kinesiotape prn, and Functional Dry Needling as needed.    Stephani Quinones, PT,  DPT, OCS

## 2024-11-18 ENCOUNTER — CLINICAL SUPPORT (OUTPATIENT)
Dept: REHABILITATION | Facility: OTHER | Age: 49
End: 2024-11-18
Payer: COMMERCIAL

## 2024-11-18 DIAGNOSIS — G89.29 CHRONIC PAIN OF LEFT ANKLE: Primary | ICD-10-CM

## 2024-11-18 DIAGNOSIS — R60.0 LEG EDEMA: ICD-10-CM

## 2024-11-18 DIAGNOSIS — M25.572 CHRONIC PAIN OF LEFT ANKLE: Primary | ICD-10-CM

## 2024-11-18 PROCEDURE — 97140 MANUAL THERAPY 1/> REGIONS: CPT | Mod: PN

## 2024-11-18 PROCEDURE — 97112 NEUROMUSCULAR REEDUCATION: CPT | Mod: PN

## 2024-11-18 PROCEDURE — 97530 THERAPEUTIC ACTIVITIES: CPT | Mod: PN

## 2024-11-18 NOTE — PROGRESS NOTES
[facility]   Physical Therapy Treatment Note     [facility]  [facility] Number: [id number]    Therapy Diagnosis:   Encounter Diagnoses   Name Primary?    Chronic pain of left ankle Yes    Leg edema      Physician: Roula Bobby PA-C    Visit Date: 11/18/2024    Physician: Jaime Saravia MD     Physician Orders: Physical Therapy Evaluate and Treat  Medical Diagnosis from Referral:  Tendonitis, Achilles, left [M76.62]   Evaluation Date: 11/13/2024  Authorization Period Expiration: 11/7/2025  Plan of Care Expiration: 11/13/2024 to 2/13/25  Visit # / Visits authorized: 2/20     Time In: 0930am  Time Out: 1030am  Total Billable Time: 60 minutes     Precautions: Standard      SUBJECTIVE     Pt reports: that she is doing well today. Pt states that she played tennis this weekend and is having mild left hip pain today.  She was compliant with home exercise program.  Response to previous treatment: decreased left hip pain  Functional change: improved tolerance to tennis    Pain: 2/10  Location: left hip      OBJECTIVE     Objective Measures updated at progress report unless specified.       TREATMENT     Total Treatment time (time-based codes) separate from Evaluation: 60 minutes     JAMISON received the treatments listed below:        Manual Therapy was provided for 15 minutes for improved joint mobility including:  A/P glide L TC joint   Gr V LAD L TC joint  L Hip lateral glide with mobilization belt  L Hip LAD             Patient received neuromuscular reeducation for 15 minutes for improved proprioception and balance including:  SL balance with KB pass vs 10# 30x R/L  R/L SL RDL 20x   SL hip abduction 3x10 repetitions  SL hip circles 30x R/L        Patient received therapeutic activities for 30 minutes for improved tolerance to functional activities including:  Recumbent bike x 8 minutes Level 1.0  SL heel raise on shuttle with eccentric lowering vs 50# 20x R/L  Squats on  "shuttle vs 75# 3x10 repetitions  Half kneeling DF stretch 10x10" hold       PATIENT EDUCATION AND HOME EXERCISES     Home Exercises Provided and Patient Education Provided     Education provided:   PT educated pt on importance of compliance with their HEP this visit.     Written Home Exercises Provided: Patient instructed to cont prior HEP. Exercises were reviewed and JAMISON was able to demonstrate them prior to the end of the session.  JAMISON demonstrated good  understanding of the education provided. See EMR under Patient Instructions for exercises provided during therapy sessions    ASSESSMENT   Pt tolerated tx session well today and completed all therapeutic exercises with minimal/no increase in ankle pain. Progressed hip and and ankle strengthening exercises this visit.     JAMISON Is progressing well towards her goals.   Pt prognosis is Good.     Pt will continue to benefit from skilled outpatient physical therapy to address the deficits listed in the problem list box on initial evaluation, provide pt/family education and to maximize pt's level of independence in the home and community environment.     Pt's spiritual, cultural and educational needs considered and pt agreeable to plan of care and goals.     Anticipated barriers to physical therapy: None    GOALS:  Short Term Goals:     1.) Pt will improve their FOTO score by 5% to return to PLOF. (Progressing, not met)  2.) Pt will decrease their left ankle pain to 2/10 for improved QOL. (Progressing, not met)  3.) Pt will improve their left ankle dorsiflexion AROM to 20 degrees for improved ability to descend stairs. (Progressing, not met)  4.) Pt will improve their left ankle dorsiflexion strength to 5/5 to return to their PLOF. (Progressing, not met)  5.) Pt will become independent with their HEP to improve strength and tolerance to functional activities. (Progressing, not met)     Long Term Goals:  1.) Pt will improve their FOTO score by 10% to return to PLOF. " (Progressing, not met)  2.) Pt will decrease their left ankle pain to 0/10 for improved QOL. (Progressing, not met)  3.) Pt will improve their left ankle plantarflexion AROM to 65 degrees for improved return to tennis. (Progressing, not met)  4.) Pt will improve their left ankle plantarflexion strength to 5/5 to return to their PLOF. (Progressing, not met)  5.) Pt will tolerate playing tennis for one hour with no increase in left ankle pain to return to PLOF. (Progressing, not met)         Plan      Plan of care Certification: 11/13/2024 to 2/13/25     Outpatient Physical Therapy 2 times weekly for 10 weeks to include the following interventions: Therapeutic Exercises, Manual Therapeutic Technique, Neuromuscular Re Education, Therapeutic Activities. Modalities, Kinesiotape prn, and Functional Dry Needling as needed.      Stephani Quinones, PT

## 2024-11-21 ENCOUNTER — CLINICAL SUPPORT (OUTPATIENT)
Dept: REHABILITATION | Facility: OTHER | Age: 49
End: 2024-11-21
Payer: COMMERCIAL

## 2024-11-21 DIAGNOSIS — M25.572 CHRONIC PAIN OF LEFT ANKLE: Primary | ICD-10-CM

## 2024-11-21 DIAGNOSIS — G89.29 CHRONIC PAIN OF LEFT ANKLE: Primary | ICD-10-CM

## 2024-11-21 DIAGNOSIS — R60.0 LEG EDEMA: ICD-10-CM

## 2024-11-21 PROCEDURE — 97112 NEUROMUSCULAR REEDUCATION: CPT | Mod: PN

## 2024-11-21 PROCEDURE — 97530 THERAPEUTIC ACTIVITIES: CPT | Mod: PN

## 2024-11-21 PROCEDURE — 97140 MANUAL THERAPY 1/> REGIONS: CPT | Mod: PN

## 2024-11-21 NOTE — PROGRESS NOTES
[facility]   Physical Therapy Treatment Note     [facility]  [facility] Number: [id number]    Therapy Diagnosis:   Encounter Diagnoses   Name Primary?    Chronic pain of left ankle Yes    Leg edema      Physician: Roula Bobby PA-C    Visit Date: 11/21/2024    Physician: Jaime Saravia MD     Physician Orders: Physical Therapy Evaluate and Treat  Medical Diagnosis from Referral:  Tendonitis, Achilles, left [M76.62]   Evaluation Date: 11/13/2024  Authorization Period Expiration: 11/7/2025  Plan of Care Expiration: 11/13/2024 to 2/13/25  Visit # / Visits authorized: 3/20     Time In: 1000am  Time Out: 1100am  Total Billable Time: 60 minutes     Precautions: Standard      SUBJECTIVE     Pt reports: that she is doing well today. Pt is compliant with her home exercises and having much less hip pain.      She was compliant with home exercise program.  Response to previous treatment: decreased left hip pain  Functional change: improved tolerance to tennis    Pain: 2/10  Location: left hip      OBJECTIVE     Objective Measures updated at progress report unless specified.       TREATMENT     Total Treatment time (time-based codes) separate from Evaluation: 60 minutes     JAMISON received the treatments listed below:        Manual Therapy was provided for 15 minutes for improved joint mobility including:  A/P glide L TC joint   Gr V LAD L TC joint  L Hip lateral glide with mobilization belt  L Hip LAD             Patient received neuromuscular reeducation for 15 minutes for improved proprioception and balance including:  SL hip abduction 3x10 repetitions  SL hip circles 30x R/L        Patient received therapeutic activities for 30 minutes for improved tolerance to functional activities including:  Recumbent bike x 8 minutes Level 1.0  SL balance with KB pass vs 10# 30x R/L on foam  R/L SL RDL 20x   SL heel raise on shuttle with eccentric lowering vs 50# 20x R/L  Squats on  "shuttle vs 75# 3x10 repetitions  Half kneeling DF stretch 10x10" hold   +Lateral band walks on turf vs RTB x4 laps      PATIENT EDUCATION AND HOME EXERCISES     Home Exercises Provided and Patient Education Provided     Education provided:   PT educated pt on importance of compliance with their HEP this visit.     Written Home Exercises Provided: Patient instructed to cont prior HEP. Exercises were reviewed and JAMISON was able to demonstrate them prior to the end of the session.  JAMISON demonstrated good  understanding of the education provided. See EMR under Patient Instructions for exercises provided during therapy sessions    ASSESSMENT   Pt tolerated tx session well today and completed all therapeutic exercises with minimal/no increase in hip or ankle pain. Updated patient's home program to include lateral band walk exercise. Continue PT POC.    JAMISON Is progressing well towards her goals.   Pt prognosis is Good.     Pt will continue to benefit from skilled outpatient physical therapy to address the deficits listed in the problem list box on initial evaluation, provide pt/family education and to maximize pt's level of independence in the home and community environment.     Pt's spiritual, cultural and educational needs considered and pt agreeable to plan of care and goals.     Anticipated barriers to physical therapy: None    GOALS:  Short Term Goals:     1.) Pt will improve their FOTO score by 5% to return to PLOF. (Progressing, not met)  2.) Pt will decrease their left ankle pain to 2/10 for improved QOL. (Progressing, not met)  3.) Pt will improve their left ankle dorsiflexion AROM to 20 degrees for improved ability to descend stairs. (Progressing, not met)  4.) Pt will improve their left ankle dorsiflexion strength to 5/5 to return to their PLOF. (Progressing, not met)  5.) Pt will become independent with their HEP to improve strength and tolerance to functional activities. (Progressing, not met)     Long Term " Goals:  1.) Pt will improve their FOTO score by 10% to return to PLOF. (Progressing, not met)  2.) Pt will decrease their left ankle pain to 0/10 for improved QOL. (Progressing, not met)  3.) Pt will improve their left ankle plantarflexion AROM to 65 degrees for improved return to tennis. (Progressing, not met)  4.) Pt will improve their left ankle plantarflexion strength to 5/5 to return to their PLOF. (Progressing, not met)  5.) Pt will tolerate playing tennis for one hour with no increase in left ankle pain to return to PLOF. (Progressing, not met)         Plan      Plan of care Certification: 11/13/2024 to 2/13/25     Outpatient Physical Therapy 2 times weekly for 10 weeks to include the following interventions: Therapeutic Exercises, Manual Therapeutic Technique, Neuromuscular Re Education, Therapeutic Activities. Modalities, Kinesiotape prn, and Functional Dry Needling as needed.      Stephani Quinones, PT

## 2024-12-03 ENCOUNTER — CLINICAL SUPPORT (OUTPATIENT)
Dept: REHABILITATION | Facility: OTHER | Age: 49
End: 2024-12-03
Payer: COMMERCIAL

## 2024-12-03 DIAGNOSIS — R60.0 LEG EDEMA: ICD-10-CM

## 2024-12-03 DIAGNOSIS — G89.29 CHRONIC PAIN OF LEFT ANKLE: Primary | ICD-10-CM

## 2024-12-03 DIAGNOSIS — M25.572 CHRONIC PAIN OF LEFT ANKLE: Primary | ICD-10-CM

## 2024-12-03 PROCEDURE — 97112 NEUROMUSCULAR REEDUCATION: CPT | Mod: PN

## 2024-12-03 PROCEDURE — 97140 MANUAL THERAPY 1/> REGIONS: CPT | Mod: PN

## 2024-12-03 PROCEDURE — 97530 THERAPEUTIC ACTIVITIES: CPT | Mod: PN

## 2024-12-03 NOTE — PROGRESS NOTES
[facility]   Physical Therapy Treatment Note     [facility]  [facility] Number: [id number]    Therapy Diagnosis:   Encounter Diagnoses   Name Primary?    Chronic pain of left ankle Yes    Leg edema      Physician: Roula Bobby PA-C    Visit Date: 12/3/2024    Physician: Jaime Saravia MD     Physician Orders: Physical Therapy Evaluate and Treat  Medical Diagnosis from Referral:  Tendonitis, Achilles, left [M76.62]   Evaluation Date: 11/13/2024  Authorization Period Expiration: 11/7/2025  Plan of Care Expiration: 11/13/2024 to 2/13/25  Visit # / Visits authorized: 4/20     Time In: 0800am  Time Out: 0900am  Total Billable Time: 60 minutes     Precautions: Standard      SUBJECTIVE     Pt reports: that she is doing great today. Pt states that her ankle is feeling much better and she began running again.   She was compliant with home exercise program.  Response to previous treatment: decreased left hip pain  Functional change: improved tolerance to tennis    Pain: 2/10  Location: left hip      OBJECTIVE     Objective Measures updated at progress report unless specified.       TREATMENT     Total Treatment time (time-based codes) separate from Evaluation: 60 minutes     JAMISON received the treatments listed below:        Manual Therapy was provided for 15 minutes for improved joint mobility including:  A/P glide L TC joint   Gr V LAD L TC joint  L Hip lateral glide with mobilization belt  L Hip LAD             Patient received neuromuscular reeducation for 15 minutes for improved proprioception and balance including:  SL hip abduction 3x10 repetitions  SL hip circles 30x R/L  +3 way cone slider on turf 10x      Patient received therapeutic activities for 30 minutes for improved tolerance to functional activities including:  Recumbent bike x 8 minutes Level 1.0  SL balance with KB pass vs 10# 30x R/L on foam  R/L SL RDL 20x   SL heel raise on shuttle with eccentric  "lowering vs 50# 20x R/L  Squats on shuttle vs 75# 3x10 repetitions  Half kneeling DF stretch 10x10" hold   Lateral band walks on turf vs RTB x4 laps  +Pt education on return to running program      PATIENT EDUCATION AND HOME EXERCISES     Home Exercises Provided and Patient Education Provided     Education provided:   PT educated pt on importance of compliance with their HEP this visit.     Written Home Exercises Provided: Patient instructed to cont prior HEP. Exercises were reviewed and JAMISON was able to demonstrate them prior to the end of the session.  JAMISON demonstrated good  understanding of the education provided. See EMR under Patient Instructions for exercises provided during therapy sessions    ASSESSMENT   Pt tolerated tx session well today. Educated pt on return to running program this visit as well as dynamic warmup vs static stretching for cool down. Continue PT POC.  JAMISON Is progressing well towards her goals.   Pt prognosis is Good.     Pt will continue to benefit from skilled outpatient physical therapy to address the deficits listed in the problem list box on initial evaluation, provide pt/family education and to maximize pt's level of independence in the home and community environment.     Pt's spiritual, cultural and educational needs considered and pt agreeable to plan of care and goals.     Anticipated barriers to physical therapy: None    GOALS:  Short Term Goals:     1.) Pt will improve their FOTO score by 5% to return to PLOF. (Progressing, not met)  2.) Pt will decrease their left ankle pain to 2/10 for improved QOL. (Progressing, not met)  3.) Pt will improve their left ankle dorsiflexion AROM to 20 degrees for improved ability to descend stairs. (Progressing, not met)  4.) Pt will improve their left ankle dorsiflexion strength to 5/5 to return to their PLOF. (Progressing, not met)  5.) Pt will become independent with their HEP to improve strength and tolerance to functional activities. " (Progressing, not met)     Long Term Goals:  1.) Pt will improve their FOTO score by 10% to return to PLOF. (Progressing, not met)  2.) Pt will decrease their left ankle pain to 0/10 for improved QOL. (Progressing, not met)  3.) Pt will improve their left ankle plantarflexion AROM to 65 degrees for improved return to tennis. (Progressing, not met)  4.) Pt will improve their left ankle plantarflexion strength to 5/5 to return to their PLOF. (Progressing, not met)  5.) Pt will tolerate playing tennis for one hour with no increase in left ankle pain to return to PLOF. (Progressing, not met)         Plan      Plan of care Certification: 11/13/2024 to 2/13/25     Outpatient Physical Therapy 2 times weekly for 10 weeks to include the following interventions: Therapeutic Exercises, Manual Therapeutic Technique, Neuromuscular Re Education, Therapeutic Activities. Modalities, Kinesiotape prn, and Functional Dry Needling as needed.      Stephani Quinones, PT

## 2024-12-10 ENCOUNTER — CLINICAL SUPPORT (OUTPATIENT)
Dept: REHABILITATION | Facility: OTHER | Age: 49
End: 2024-12-10
Payer: COMMERCIAL

## 2024-12-10 DIAGNOSIS — R60.0 LEG EDEMA: ICD-10-CM

## 2024-12-10 DIAGNOSIS — M25.572 CHRONIC PAIN OF LEFT ANKLE: Primary | ICD-10-CM

## 2024-12-10 DIAGNOSIS — G89.29 CHRONIC PAIN OF LEFT ANKLE: Primary | ICD-10-CM

## 2024-12-10 PROCEDURE — 97530 THERAPEUTIC ACTIVITIES: CPT | Mod: PN

## 2024-12-10 PROCEDURE — 97112 NEUROMUSCULAR REEDUCATION: CPT | Mod: PN

## 2024-12-10 NOTE — PROGRESS NOTES
[facility]   Physical Therapy Treatment Note     [facility]  [facility] [id number]    Therapy Diagnosis:   Encounter Diagnoses   Name Primary?    Chronic pain of left ankle Yes    Leg edema      Physician: Roula Bobby PA-C    Visit Date: 12/10/2024    Physician: Jaime Saravia MD     Physician Orders: Physical Therapy Evaluate and Treat  Medical Diagnosis from Referral:  Tendonitis, Achilles, left [M76.62]   Evaluation Date: 11/13/2024  Authorization Period Expiration: 11/7/2025  Plan of Care Expiration: 11/13/2024 to 2/13/25  Visit # / Visits authorized: 11/20     Time In: 0800am  Time Out: 0905am  Total Billable Time: 65 minutes     Precautions: Standard      SUBJECTIVE     Pt reports: that her left ankle feels great today. Pt woke up with severe neck pain and inability to turn her head to the right.       She was compliant with home exercise program.  Response to previous treatment: decreased left hip pain  Functional change: improved tolerance to tennis    Pain: 2/10  Location: left hip      OBJECTIVE     Objective Measures updated at progress report unless specified.       TREATMENT     Total Treatment time (time-based codes) separate from Evaluation: 60 minutes     JAMISON received the treatments listed below:        Manual Therapy was provided for 2 minutes for improved joint mobility including:  A/P glide L TC joint   Gr V LAD L TC joint  L Hip lateral glide with mobilization belt  L Hip LAD   +Cervical distraction  +cervical lateral glides Gr III/IV  +HVLAT Thoracic screw            Patient received neuromuscular reeducation for 20 minutes for improved proprioception and balance including:  SL hip abduction 3x10 repetitions  SL hip circles 30x R/L  3 way cone slider on turf 10x  +Seated thoracic extension vs 1/2 foam 5x  +cat camel 10x  +thread the needle with small foam roller 5x R/L      Patient received therapeutic activities for 43 minutes for  "improved tolerance to functional activities including:  Recumbent bike x 8 minutes Level 1.0  SL balance with KB pass vs 10# 30x R/L on foam  R/L SL RDL 20x vs 10# KB  SL heel raise on shuttle with eccentric lowering vs 50# 20x R/L  Squats on shuttle vs 75# 3x10 repetitions  Half kneeling DF stretch 10x10" hold   Lateral band walks on turf vs RTB x4 laps  +Assessment/Updated HEP for postural deficits/pain      PATIENT EDUCATION AND HOME EXERCISES     Home Exercises Provided and Patient Education Provided     Education provided:   PT educated pt on importance of compliance with their HEP this visit.     Written Home Exercises Provided: Patient instructed to cont prior HEP. Exercises were reviewed and JAMISON was able to demonstrate them prior to the end of the session.  JAMISON demonstrated good  understanding of the education provided. See EMR under Patient Instructions for exercises provided during therapy sessions    ASSESSMENT   Pt tolerated treatment session well today and was able to perform full cervical rotation pain free after PT performed manual therapy intervention. Provided pt with new HEP addressing neck pain. Continue PT POC.            JAMISON Is progressing well towards her goals.   Pt prognosis is Good.     Pt will continue to benefit from skilled outpatient physical therapy to address the deficits listed in the problem list box on initial evaluation, provide pt/family education and to maximize pt's level of independence in the home and community environment.     Pt's spiritual, cultural and educational needs considered and pt agreeable to plan of care and goals.     Anticipated barriers to physical therapy: None    GOALS:  Short Term Goals:     1.) Pt will improve their FOTO score by 5% to return to PLOF. (Progressing, not met)  2.) Pt will decrease their left ankle pain to 2/10 for improved QOL. (Progressing, not met)  3.) Pt will improve their left ankle dorsiflexion AROM to 20 degrees for improved ability to " descend stairs. (Progressing, not met)  4.) Pt will improve their left ankle dorsiflexion strength to 5/5 to return to their PLOF. (Progressing, not met)  5.) Pt will become independent with their HEP to improve strength and tolerance to functional activities. (Progressing, not met)     Long Term Goals:  1.) Pt will improve their FOTO score by 10% to return to PLOF. (Progressing, not met)  2.) Pt will decrease their left ankle pain to 0/10 for improved QOL. (Progressing, not met)  3.) Pt will improve their left ankle plantarflexion AROM to 65 degrees for improved return to tennis. (Progressing, not met)  4.) Pt will improve their left ankle plantarflexion strength to 5/5 to return to their PLOF. (Progressing, not met)  5.) Pt will tolerate playing tennis for one hour with no increase in left ankle pain to return to PLOF. (Progressing, not met)         Plan      Plan of care Certification: 11/13/2024 to 2/13/25     Outpatient Physical Therapy 2 times weekly for 10 weeks to include the following interventions: Therapeutic Exercises, Manual Therapeutic Technique, Neuromuscular Re Education, Therapeutic Activities. Modalities, Kinesiotape prn, and Functional Dry Needling as needed.      Stephani Cervantes, PT

## 2024-12-12 ENCOUNTER — CLINICAL SUPPORT (OUTPATIENT)
Dept: REHABILITATION | Facility: OTHER | Age: 49
End: 2024-12-12
Payer: COMMERCIAL

## 2024-12-12 DIAGNOSIS — R60.0 LEG EDEMA: ICD-10-CM

## 2024-12-12 DIAGNOSIS — G89.29 CHRONIC PAIN OF LEFT ANKLE: Primary | ICD-10-CM

## 2024-12-12 DIAGNOSIS — M25.572 CHRONIC PAIN OF LEFT ANKLE: Primary | ICD-10-CM

## 2024-12-12 PROCEDURE — 97112 NEUROMUSCULAR REEDUCATION: CPT | Mod: PN

## 2024-12-12 PROCEDURE — 97530 THERAPEUTIC ACTIVITIES: CPT | Mod: PN

## 2024-12-12 NOTE — PROGRESS NOTES
"[facility]   Physical Therapy Treatment Note     Name: Eunice Hickman  Clinic Number: [id number]    Therapy Diagnosis:   Encounter Diagnoses   Name Primary?    Chronic pain of left ankle Yes    Leg edema      Physician: Roula Bobby PA-C    Visit Date: 12/12/2024    Physician: Jaime Saravia MD     Physician Orders: Physical Therapy Evaluate and Treat  Medical Diagnosis from Referral:  Tendonitis, Achilles, left [M76.62]   Evaluation Date: 11/13/2024  Authorization Period Expiration: 11/7/2025  Plan of Care Expiration: 11/13/2024 to 2/13/25  Visit # / Visits authorized: 12/20     Time In: 1000am  Time Out: 1100am  Total Billable Time: 60 minutes     Precautions: Standard      SUBJECTIVE     Pt reports: that she played tennis this morning and felt great. Pt is also having less right sided neck pain.      She was compliant with home exercise program.  Response to previous treatment: decreased left hip pain  Functional change: improved tolerance to tennis    Pain: 2/10  Location: left hip      OBJECTIVE     Objective Measures updated at progress report unless specified.       TREATMENT     Total Treatment time (time-based codes) separate from Evaluation: 60 minutes     JAMISON received the treatments listed below:        Manual Therapy was provided for 2 minutes for improved joint mobility including:  A/P glide L TC joint   Gr V LAD L TC joint  L Hip lateral glide with mobilization belt  L Hip LAD   Cervical distraction  cervical lateral glides Gr III/IV  HVLAT Thoracic screw            Patient received neuromuscular reeducation for 30 minutes for improved proprioception and balance including:  SL hip abduction 3x10 repetitions  SL hip circles 30x R/L  3 way cone slider on turf 10x  Seated thoracic extension vs 1/2 foam 5x  cat camel 10x  thread the needle with small foam roller 5x R/L  +R cervical rotation MET pt in Supine  +Prone Y 20x5" hold  +Prone T 20x5" hold " "    Patient received therapeutic activities for 28 minutes for improved tolerance to functional activities including:  Recumbent bike x 8 minutes Level 1.0  SL balance with KB pass vs 10# 30x R/L on foam  R/L SL RDL 20x vs 10# KB  SL heel raise on shuttle with eccentric lowering vs 50# 20x R/L  Squats on shuttle vs 75# 3x10 repetitions  Half kneeling DF stretch 10x10" hold   Lateral band walks on turf vs RTB x4 laps      PATIENT EDUCATION AND HOME EXERCISES     Home Exercises Provided and Patient Education Provided     Education provided:   PT educated pt on importance of compliance with their HEP this visit.     Written Home Exercises Provided: Patient instructed to cont prior HEP. Exercises were reviewed and JAMISON was able to demonstrate them prior to the end of the session.  JAMISON demonstrated good  understanding of the education provided. See EMR under Patient Instructions for exercises provided during therapy sessions    ASSESSMENT   Pt tolerated treatment session well today with no increase in neck pain. Pt continues to progress in her ankle strength and mobility as well. PT educated on dynamic warmup and static stretching following exercise. Continue PT POC.      JAMISON Is progressing well towards her goals.   Pt prognosis is Good.     Pt will continue to benefit from skilled outpatient physical therapy to address the deficits listed in the problem list box on initial evaluation, provide pt/family education and to maximize pt's level of independence in the home and community environment.     Pt's spiritual, cultural and educational needs considered and pt agreeable to plan of care and goals.     Anticipated barriers to physical therapy: None    GOALS:  Short Term Goals:     1.) Pt will improve their FOTO score by 5% to return to PLOF. (Progressing, not met)  2.) Pt will decrease their left ankle pain to 2/10 for improved QOL. (Progressing, not met)  3.) Pt will improve their left ankle dorsiflexion AROM to 20 " degrees for improved ability to descend stairs. (Progressing, not met)  4.) Pt will improve their left ankle dorsiflexion strength to 5/5 to return to their PLOF. (Progressing, not met)  5.) Pt will become independent with their HEP to improve strength and tolerance to functional activities. (Progressing, not met)     Long Term Goals:  1.) Pt will improve their FOTO score by 10% to return to PLOF. (Progressing, not met)  2.) Pt will decrease their left ankle pain to 0/10 for improved QOL. (Progressing, not met)  3.) Pt will improve their left ankle plantarflexion AROM to 65 degrees for improved return to tennis. (Progressing, not met)  4.) Pt will improve their left ankle plantarflexion strength to 5/5 to return to their PLOF. (Progressing, not met)  5.) Pt will tolerate playing tennis for one hour with no increase in left ankle pain to return to PLOF. (Progressing, not met)         Plan      Plan of care Certification: 11/13/2024 to 2/13/25     Outpatient Physical Therapy 2 times weekly for 10 weeks to include the following interventions: Therapeutic Exercises, Manual Therapeutic Technique, Neuromuscular Re Education, Therapeutic Activities. Modalities, Kinesiotape prn, and Functional Dry Needling as needed.      Stephani Cervantes, PT

## 2025-01-10 ENCOUNTER — CLINICAL SUPPORT (OUTPATIENT)
Dept: REHABILITATION | Facility: OTHER | Age: 50
End: 2025-01-10
Payer: COMMERCIAL

## 2025-01-10 DIAGNOSIS — G89.29 CHRONIC PAIN OF LEFT ANKLE: Primary | ICD-10-CM

## 2025-01-10 DIAGNOSIS — R60.0 LEG EDEMA: ICD-10-CM

## 2025-01-10 DIAGNOSIS — M25.572 CHRONIC PAIN OF LEFT ANKLE: Primary | ICD-10-CM

## 2025-01-10 PROCEDURE — 97530 THERAPEUTIC ACTIVITIES: CPT | Mod: PN

## 2025-01-10 PROCEDURE — 97112 NEUROMUSCULAR REEDUCATION: CPT | Mod: PN

## 2025-01-10 NOTE — PROGRESS NOTES
"[facility]   Physical Therapy Treatment Note     Name: Eunice Hickman  Clinic Number: [id number]    Therapy Diagnosis:   Encounter Diagnoses   Name Primary?    Chronic pain of left ankle Yes    Leg edema      Physician: Roula Bobby PA-C    Visit Date: 1/10/2025         Physician Orders: Physical Therapy Evaluate and Treat  Medical Diagnosis from Referral:  Tendonitis, Achilles, left [M76.62]   Evaluation Date: 11/13/2024  Authorization Period Expiration: 11/7/2025  Plan of Care Expiration: 11/13/2024 to 2/13/25  Visit # / Visits authorized: 1/20     Time In: 1000am  Time Out: 1100am  Total Billable Time: 60 minutes     Precautions: Standard      SUBJECTIVE     Pt reports: that she is doing well. Pt is having much less pain and is ready to start her return to running program.       She was compliant with home exercise program.  Response to previous treatment: decreased left hip pain  Functional change: improved tolerance to tennis    Pain: 2/10  Location: left hip      OBJECTIVE     Objective Measures updated at progress report unless specified.       TREATMENT     Total Treatment time (time-based codes) separate from Evaluation: 60 minutes     JAMISON received the treatments listed below:        Manual Therapy was provided for 2 minutes for improved joint mobility including:  A/P glide L TC joint   Gr V LAD L TC joint  L Hip lateral glide with mobilization belt  L Hip LAD   Cervical distraction  cervical lateral glides Gr III/IV  HVLAT Thoracic screw            Patient received neuromuscular reeducation for 30 minutes for improved proprioception and balance including:  SL hip abduction 3x10 repetitions  SL hip circles 30x R/L  3 way cone slider on turf 10x  Seated thoracic extension vs 1/2 foam 5x  cat camel 10x  thread the needle with small foam roller 5x R/L  +R cervical rotation MET pt in Supine  +Prone Y 20x5" hold  +Prone T 20x5" hold     Patient received therapeutic " "activities for 28 minutes for improved tolerance to functional activities including:  Recumbent bike x 8 minutes Level 1.0  SL balance with KB pass vs 10# 30x R/L on foam  R/L SL RDL 20x vs 10# KB  SL heel raise on shuttle with eccentric lowering vs 50# 20x R/L  Squats on shuttle vs 75# 3x10 repetitions  Half kneeling DF stretch 10x10" hold   Lateral band walks on turf vs RTB x4 laps      PATIENT EDUCATION AND HOME EXERCISES     Home Exercises Provided and Patient Education Provided     Education provided:   PT educated pt on importance of compliance with their HEP this visit.     Written Home Exercises Provided: Patient instructed to cont prior HEP. Exercises were reviewed and JAMISON was able to demonstrate them prior to the end of the session.  JAMISON demonstrated good  understanding of the education provided. See EMR under Patient Instructions for exercises provided during therapy sessions    ASSESSMENT   Pt tolerated treatment session well today with no increase in pain. Provided pt with return to running program today. Continue PT POC.      JAMISON Is progressing well towards her goals.   Pt prognosis is Good.     Pt will continue to benefit from skilled outpatient physical therapy to address the deficits listed in the problem list box on initial evaluation, provide pt/family education and to maximize pt's level of independence in the home and community environment.     Pt's spiritual, cultural and educational needs considered and pt agreeable to plan of care and goals.     Anticipated barriers to physical therapy: None    GOALS:  Short Term Goals:     1.) Pt will improve their FOTO score by 5% to return to PLOF. (Progressing, not met)  2.) Pt will decrease their left ankle pain to 2/10 for improved QOL. (Progressing, not met)  3.) Pt will improve their left ankle dorsiflexion AROM to 20 degrees for improved ability to descend stairs. (Progressing, not met)  4.) Pt will improve their left ankle dorsiflexion strength to " 5/5 to return to their PLOF. (Progressing, not met)  5.) Pt will become independent with their HEP to improve strength and tolerance to functional activities. (Progressing, not met)     Long Term Goals:  1.) Pt will improve their FOTO score by 10% to return to PLOF. (Progressing, not met)  2.) Pt will decrease their left ankle pain to 0/10 for improved QOL. (Progressing, not met)  3.) Pt will improve their left ankle plantarflexion AROM to 65 degrees for improved return to tennis. (Progressing, not met)  4.) Pt will improve their left ankle plantarflexion strength to 5/5 to return to their PLOF. (Progressing, not met)  5.) Pt will tolerate playing tennis for one hour with no increase in left ankle pain to return to PLOF. (Progressing, not met)         Plan      Plan of care Certification: 11/13/2024 to 2/13/25     Outpatient Physical Therapy 2 times weekly for 10 weeks to include the following interventions: Therapeutic Exercises, Manual Therapeutic Technique, Neuromuscular Re Education, Therapeutic Activities. Modalities, Kinesiotape prn, and Functional Dry Needling as needed.      Stephani Cervantes, PT

## 2025-06-05 DIAGNOSIS — M54.2 NECK PAIN: Primary | ICD-10-CM

## 2025-06-25 ENCOUNTER — CLINICAL SUPPORT (OUTPATIENT)
Dept: REHABILITATION | Facility: OTHER | Age: 50
End: 2025-06-25
Payer: COMMERCIAL

## 2025-06-25 DIAGNOSIS — M54.2 NECK PAIN: Primary | ICD-10-CM

## 2025-06-25 PROBLEM — R60.0 LEG EDEMA: Status: RESOLVED | Noted: 2024-11-13 | Resolved: 2025-06-25

## 2025-06-25 PROBLEM — M76.62 LEFT ACHILLES TENDINITIS: Status: RESOLVED | Noted: 2024-05-22 | Resolved: 2025-06-25

## 2025-06-25 PROBLEM — Z74.09 IMPAIRED FUNCTIONAL MOBILITY AND ACTIVITY TOLERANCE: Status: RESOLVED | Noted: 2024-05-22 | Resolved: 2025-06-25

## 2025-06-25 PROBLEM — M25.552 LEFT HIP PAIN: Status: RESOLVED | Noted: 2024-05-22 | Resolved: 2025-06-25

## 2025-06-25 PROBLEM — G89.29 CHRONIC PAIN OF LEFT ANKLE: Status: RESOLVED | Noted: 2024-11-13 | Resolved: 2025-06-25

## 2025-06-25 PROBLEM — M25.572 CHRONIC PAIN OF LEFT ANKLE: Status: RESOLVED | Noted: 2024-11-13 | Resolved: 2025-06-25

## 2025-06-25 PROCEDURE — 97530 THERAPEUTIC ACTIVITIES: CPT | Mod: PN

## 2025-06-25 PROCEDURE — 97161 PT EVAL LOW COMPLEX 20 MIN: CPT | Mod: PN

## 2025-06-25 PROCEDURE — 97112 NEUROMUSCULAR REEDUCATION: CPT | Mod: PN

## 2025-06-25 NOTE — LETTER
June 25, 2025  Gabriela Taylor MD  [address]  [address]  [facility]  [place] [address]      To whom it may concern,     The attached plan of care is being sent to you for review and reference.    You may indicate your approval by signing the document electronically, or by faxing/mailing a signed copy of the final page of this document back to the attention of Stephani Cervantes PT:         Plan of Care 6/25/25   Effective from: 6/25/2025  Effective to: 9/16/2025    Plan ID: [id number]            Participants as of Finalize on 6/25/2025    Name Type Comments Contact Info    Gabriela Taylor MD Referring Provider  [phone]    Stephani Cervantes PT Physical Therapist         Last Plan Note     No notes of the expected types and statuses found.        Current Participants as of 6/25/2025    Name Type Comments Contact Info    Gabriela Taylor MD Referring Provider  [phone]    Signature pending    Stephani Cervantes PT Physical Therapist      Electronically signed by Stephani Cervantes PT at 6/25/2025 0905 CDT            Sincerely,      Stephani Cervantes PT  [facility]                                                            Dear Stephani Cervantes PT,    RE: Ms. Eunice Hickman, MRN: [id number]    I certify that I have reviewed the attached plan of care and agree to the details within.        ___________________________  ___________________________  Provider Printed Name   Provider Signed Name      ___________________________  Date and Time

## 2025-06-25 NOTE — PROGRESS NOTES
Outpatient Rehab    Physical Therapy Evaluation    Patient Name: JAMISON Hickman  MRN: [id number]  YOB: 1975  Encounter Date: 6/25/2025    Therapy Diagnosis:   Encounter Diagnosis   Name Primary?    Neck pain Yes     Physician: Gabriela Taylor *    Physician Orders: Eval and Treat  Medical Diagnosis: Neck pain  Surgical Diagnosis: Not applicable for this Episode   Surgical Date: Not applicable for this Episode  Days Since Last Surgery: Not applicable for this Episode    Visit # / Visits Authorized:  1 / 1  Insurance Authorization Period: 6/5/2025 to 6/5/2026  Date of Evaluation: 6/25/2025  Plan of Care Certification: 6/25/2025 to 9/25/25     Time In: 0800   Time Out: 0900  Total Time (in minutes): 60   Total Billable Time (in minutes): 60           Subjective   History of Present Illness  JAMISON is a 50 y.o. female                  History of Present Condition/Illness: Pt began experiencing neck and shoulder pain one month ago. Pt has neck pain on the R and L (R>L) that began one month ago. She was diagnosed with frozen shoulder yesterday. Pt is having trouble lifting her right arm overhead and she has capsular pattern of pain in her right arm. Pt has neck pain looking overhead and has tightness looking up. 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 Pt is right handed and is having tightness in her hand as well. Pt has not played tennis in 2 weeks but she usually plays 4x/week. Pt would like to return to her prior routine. She is just walking at the park and doing light weights for her arms. Pt has a dependent daughter that she transfers and cares for.     Activities of Daily Living  Social history was obtained from Patient.    General Prior Level of Function Comments: Tennis 4x week  General Current Level of Function Comments: walking and light weights  Patient Roles: Caregiver for child  Patient Responsibilities: Community mobility, Driving, Health  management, Home management, Meal prep, Laundry, Personal ADL, Shopping    Previously independent with activities of daily living? Yes     Currently independent with activities of daily living? Yes              Pain     Patient reports a current pain level of 0/10. Pain at best is reported as 0/10. Pain at worst is reported as 10/10.   Location: Anterior and lateral shoulder radiating to hand  Clinical Progression (since onset): Stable  Pain Qualities: Aching, Sharp  Pain-Relieving Factors: Activity modification  Pain-Aggravating Factors: Sports, Head movements, Lifting, Movement, Reaching           Past Medical History/Physical Systems Review:   Eunice Hickman  has a past medical history of Anxiety.    Eunice Hickman  has a past surgical history that includes Dilation and curettage of uterus;  section; and Hernia repair.    Eunice has a current medication list which includes the following prescription(s): alprazolam, fluticasone propionate, and oxycodone.    Review of patient's allergies indicates:  No Known Allergies     Objective      Subcranial Range of Motion   Active Restricted? Passive Restricted? Pain   Flexion         Protraction         Retraction           Cervical Range of Motion   Active (deg) Passive (deg) Pain   Flexion 50       Extension 45   Yes   Right Lateral Flexion 30   Yes   Right Rotation 60   Yes   Left Lateral Flexion 35   Yes   Left Rotation 70              Shoulder Range of Motion  Right Shoulder   Active (deg) Passive (deg) Pain   Flexion 155   Yes   Extension         Scaption         ABduction 128   Yes   ADduction         Horizontal ABduction         Horizontal ADduction         External Rotation (Shoulder ABducted 0 degrees)         External Rotation (Shoulder ABducted 45 degrees) 45   Yes   External Rotation (Shoulder ABducted 90 degrees)         Internal Rotation (Shoulder ABducted 0 degrees) 70   Yes   Internal Rotation (Shoulder ABducted 45  "degrees)         Internal Rotation (Shoulder ABducted 90 degrees)           Left Shoulder   Active (deg) Passive (deg) Pain   Flexion 165       Extension         Scaption         ABduction 167       ADduction         Horizontal ABduction         Horizontal ADduction         External Rotation (Shoulder ABducted 0 degrees)         External Rotation (Shoulder ABducted 45 degrees) 70       External Rotation (Shoulder ABducted 90 degrees)         Internal Rotation (Shoulder ABducted 0 degrees) 75       Internal Rotation (Shoulder ABducted 45 degrees)         Internal Rotation (Shoulder ABducted 90 degrees)                       Shoulder Strength - Planes of Motion   Right Strength Right Pain Left Strength Left  Pain   Flexion 3-   5     Extension 3+   5     ABduction 3-   5     ADduction 3+   5     Horizontal ABduction 3   5     Horizontal ADduction 3   5     Internal Rotation 0° 3   5     Internal Rotation 90° 3   5     External Rotation 0° 3-   5     External Rotation 90° 2+   5           Right  Strength  Right Hand Dynamometer Position: 2  Elbow Position Forearm Position Trial 1 (lbs) Trial 2  (lbs) Trial 3  (lbs) Average  (lbs) Pain   Flexed Neutral 54               Left  Strength  Left Hand Dynamometer Position: 2  Elbow Position Forearm Position Trial 1 (lbs) Trial 2 (lbs) Trial 3 (lbs) Average (lbs) Pain   Flexed Neutral 70                         Treatment:  Manual Therapy  MT 1: Sustained manual cervical distraction  MT 2: Cervical lateral glides Gr III/IV  Balance/Neuromuscular Re-Education  NMR 1: Scapular squeeze 30x 5" hold  NMR 2: Pt education on HEP/ POC  Therapeutic Activity  TA 1: R Shoulder flexion with one pound bar 30x5" Hold  TA 2: R Shoulder ER with one pound bar 30x 5" hold  TA 3: R towel slide on wall into shoulder flexion 30x5" hold  TA 4: R Towerl slide into shoulder scaption on wall 30x5" hold    Time Entry(in minutes):  PT Evaluation (Low) Time Entry: 15  Neuromuscular Re-Education " Time Entry: 15  Therapeutic Activity Time Entry: 30    Assessment & Plan   Assessment  JAMISON presents with a condition of Low complexity.   Presentation of Symptoms: Stable       Functional Limitations: Activity tolerance, Carrying objects, Completing work/school activities, Pain when reaching, Pain with ADLs/IADLs, Sitting tolerance, Reaching  Impairments: Activity intolerance, Abnormal or restricted range of motion, Impaired physical strength, Lack of appropriate home exercise program, Pain with functional activity    Prognosis: Excellent  Assessment Details: Patient demonstrates deficits with range of motion, strength, and function that limit ability to participate in school, work, and recreational activities. They would benefit from skilled PT services to normalize kinetic chain mobility, strength, and function to safely return to their prior level of activity.      The patient's spiritual, cultural, and educational needs were considered, and the patient is agreeable to the plan of care and goals.           Goals:   Active       Long Term Goals        Pt will become independent with her home exercise program.        Start:  06/25/25    Expected End:  10/20/25            Pt will improve her right shoulder flexion AROM to 180 degrees pain free to return to ADL's.        Start:  06/25/25    Expected End:  10/20/25            Pt will improve her right cervical rotation to 70 degrees pain free to return to tennis.        Start:  06/25/25    Expected End:  10/20/25            Pt will tolerate lifting 10 pounds overhead with no increase in right shoulder pain to return to tennis.        Start:  06/25/25    Expected End:  10/20/25                Stephani Cervantes PT

## 2025-07-02 ENCOUNTER — TELEPHONE (OUTPATIENT)
Dept: OBSTETRICS AND GYNECOLOGY | Facility: CLINIC | Age: 50
End: 2025-07-02
Payer: COMMERCIAL

## 2025-07-02 NOTE — TELEPHONE ENCOUNTER
----- Message from Domi sent at 7/2/2025 11:36 AM CDT -----  Pt called wanted to schedule appointment for new patient for menopause symptoms she can be reached at [phone]

## 2025-07-07 ENCOUNTER — CLINICAL SUPPORT (OUTPATIENT)
Dept: OBSTETRICS AND GYNECOLOGY | Facility: CLINIC | Age: 50
End: 2025-07-07
Payer: COMMERCIAL

## 2025-07-07 DIAGNOSIS — N95.1 MENOPAUSAL SYMPTOMS: Primary | ICD-10-CM

## 2025-07-07 NOTE — PROGRESS NOTES
Personal Information    Full Name: Eunice Hickman  1975    PCP- Dr. Gabriela Taylor    Medical History    Do you have a chronic medical condition? (e.g., diabetes, hypertension, elevated cholesterol, clotting disorder, cancer thyroid issues)   If yes, please specify:   No    2.   Have you been diagnosed with any type of cancer?  If yes, please explain:  No     3.   Do you have a history of hormone- related conditions? (e.g., endometriosis, breast cancer, or PCOS)   If yes, please explain:   No    4.   Have you previously or are you currently taking hormone replacement therapy?   If yes, please list:   Yes - Progesterone 100 mg & estrogen patch     5.   Do you have a history of an autoimmune disease?   If yes, please list:  No    6.   Have you ever had a heart attack or been diagnosed with cardiac arrhythmia?   If yes, please list:   Yes - Heart Murmur    Menstrual History    At what age did you begin menstruating?   11-12    2.   Have you experienced any changes in your menstrual cycle in the last year?   If yes, please describe:   No    3.   When was your last menstrual period or age of last period?   6/30/2025    4.   Have you had a hysterectomy or ablation?   If yes, when did menopausal symptoms begin?  No     5.   Do you still have your ovaries?   If yes, month or year:  N/A    Symptoms Assesments      Are you experiencing any of the following symptoms:  Hot Flashes: Yes   Night Sweats: Yes   Vaginal Dryness or Pain/ Discomfort with Schlater: Yes   Mood Swings: Yes   Anxiety or Depression: Yes   Sleep Disturbances: Yes   Fatigue: Yes   Weight Gain: Yes   Joint or Muscle Pain: Yes   Memory Issues, Brain Fog or Loss of Focus: Yes   Decreased Interest in Sex (Low Libido): Yes     Lifestyle Factors    How would you describe your diet?  Balanced    2.   Do you exercise?   If yes, how many days per week and for how many minutes?  Yes - Daily      3.  What types of exercise do you do?     Weight Training, Yoga, and Other - Tennis     4.   Do you smoke or use other nicotine products?    If yes, please specify frequency:   No    5.  Do you smoke or consume alcohol? If yes, please specify frequency:   Yes - Social drinker    6.  What kind of alcohol do you typically drink? (beer, wine, liquor)   Wine    7.   How would you rate your stress levels?   High    8.   Do you take any supplements?   If Yes - what supplements and brands do you take?  Yes - Magnesium & L-Theanine     Family History    Is there a family history of menopause-related conditions? (e.g., osteoporosis, breast cancer)  If yes, please specify  Yes - Maternal Aunt Breast Cancer ( before age 50)    2.   Is there a family history of heart disease?   If yes, please specify   Yes - Mother- Has cardiac stents in place     3.   Has any family member had a heart attack?   If yes, who and at what age?   Yes - Mother     4.   Has any family member had a stroke?   If yes, who and what age?  No     5.   Has any family member had blood clots or a pulmonary embolism?  If yes, who and at what age?   No    6.   Have you had a colonoscopy?  If yes, month or year:  Yes - 4/2023    7.   Have you had a mammogram?  If yes, month or year:  Yes - 5/2025 & MRI 8/2024    8.   Have you had an Annual/ Pap?   If yes, month or year:   Yes - 8/2023    9.   BMD (If patient is over 50)    No  -------------------------------------------    Spoke with patient for a total of 30 minutes during virtual visit.  Patient was guided through expectations and treatment options.     Questions answered. Encouraged to send message or call office with any questions/concerns. Verbalized understanding.       Liliane